# Patient Record
Sex: FEMALE | Race: BLACK OR AFRICAN AMERICAN | Employment: UNEMPLOYED | URBAN - METROPOLITAN AREA
[De-identification: names, ages, dates, MRNs, and addresses within clinical notes are randomized per-mention and may not be internally consistent; named-entity substitution may affect disease eponyms.]

---

## 2017-01-04 PROBLEM — I10 ESSENTIAL HYPERTENSION: Status: ACTIVE | Noted: 2017-01-04

## 2017-05-10 ENCOUNTER — HOSPITAL ENCOUNTER (OUTPATIENT)
Dept: MAMMOGRAPHY | Age: 62
Discharge: HOME | End: 2017-05-10
Attending: INTERNAL MEDICINE
Payer: MEDICARE

## 2017-05-10 DIAGNOSIS — Z12.31 VISIT FOR SCREENING MAMMOGRAM: ICD-10-CM

## 2017-05-10 PROCEDURE — 77067 SCR MAMMO BI INCL CAD: CPT

## 2017-07-20 ENCOUNTER — HOSPITAL ENCOUNTER (OUTPATIENT)
Dept: MAMMOGRAPHY | Age: 62
Discharge: HOME | End: 2017-07-20
Attending: FAMILY MEDICINE
Payer: MEDICARE

## 2017-07-20 DIAGNOSIS — Z78.0 POSTMENOPAUSAL: ICD-10-CM

## 2017-07-20 PROCEDURE — 77080 DXA BONE DENSITY AXIAL: CPT

## 2018-04-19 ENCOUNTER — HOSPITAL ENCOUNTER (OUTPATIENT)
Dept: SURGERY | Age: 63
Discharge: HOME | End: 2018-04-19

## 2018-04-19 VITALS
DIASTOLIC BLOOD PRESSURE: 76 MMHG | HEART RATE: 82 BPM | RESPIRATION RATE: 18 BRPM | BODY MASS INDEX: 23.77 KG/M2 | HEIGHT: 70 IN | WEIGHT: 166 LBS | TEMPERATURE: 98.7 F | OXYGEN SATURATION: 99 % | SYSTOLIC BLOOD PRESSURE: 152 MMHG

## 2018-04-19 NOTE — PERIOP NOTES
Patient verified name, , and surgery as listed in [facility]. Patient provided medical/health information and PTA medications to the best of their ability. TYPE  CASE:lA  Orders per surgeon: Received and dated . Labs per surgeon:None. Labs per anesthesia protocol: None. EKG  :  None, last EKG , Hx CABG 2006, no cardiac events or stents since. Patient denies GAY, or chest pain since CABG. Patient provided with and instructed on education handouts including Guide to Surgery, blood transfusions, pain management, and hand hygiene for the family and community, and [facility] brochure. Antibacterial saop and instructions given per hospital policy. Instructed patient to continue previous medications as prescribed prior to surgery unless otherwise directed and to take the following medications the day of surgery according to anesthesia guidelines : ASA, Carvedilol, Exemestane, Levothyroxine, Ranitidine, Sertraline . Instructed patient to hold  the following medications: Vitamins. Original medication prescription bottles were not visualized during patient appointment. Patient presented a list.    Patient teach back successful and patient demonstrates knowledge of instruction.

## 2018-04-22 ENCOUNTER — ANESTHESIA EVENT (OUTPATIENT)
Dept: SURGERY | Age: 63
End: 2018-04-22
Payer: MEDICARE

## 2018-04-22 RX ORDER — SODIUM CHLORIDE, SODIUM LACTATE, POTASSIUM CHLORIDE, CALCIUM CHLORIDE 600; 310; 30; 20 MG/100ML; MG/100ML; MG/100ML; MG/100ML
100 INJECTION, SOLUTION INTRAVENOUS CONTINUOUS
Status: CANCELLED | OUTPATIENT
Start: 2018-04-22

## 2018-04-22 RX ORDER — NALOXONE HYDROCHLORIDE 0.4 MG/ML
0.2 INJECTION, SOLUTION INTRAMUSCULAR; INTRAVENOUS; SUBCUTANEOUS AS NEEDED
Status: CANCELLED | OUTPATIENT
Start: 2018-04-22

## 2018-04-22 RX ORDER — SODIUM CHLORIDE 0.9 % (FLUSH) 0.9 %
5-10 SYRINGE (ML) INJECTION AS NEEDED
Status: CANCELLED | OUTPATIENT
Start: 2018-04-22

## 2018-04-22 RX ORDER — OXYCODONE HYDROCHLORIDE 5 MG/1
5 TABLET ORAL
Status: CANCELLED | OUTPATIENT
Start: 2018-04-22

## 2018-04-22 RX ORDER — HYDROMORPHONE HYDROCHLORIDE 2 MG/ML
0.5 INJECTION, SOLUTION INTRAMUSCULAR; INTRAVENOUS; SUBCUTANEOUS
Status: CANCELLED | OUTPATIENT
Start: 2018-04-22

## 2018-04-22 RX ORDER — DIPHENHYDRAMINE HYDROCHLORIDE 50 MG/ML
12.5 INJECTION, SOLUTION INTRAMUSCULAR; INTRAVENOUS
Status: CANCELLED | OUTPATIENT
Start: 2018-04-22

## 2018-04-22 RX ORDER — OXYCODONE HYDROCHLORIDE 5 MG/1
10 TABLET ORAL
Status: CANCELLED | OUTPATIENT
Start: 2018-04-22

## 2018-04-22 RX ORDER — FLUMAZENIL 0.1 MG/ML
0.2 INJECTION INTRAVENOUS
Status: CANCELLED | OUTPATIENT
Start: 2018-04-22

## 2018-04-22 RX ORDER — ACETAMINOPHEN 500 MG
1000 TABLET ORAL ONCE
Status: CANCELLED | OUTPATIENT
Start: 2018-04-22 | End: 2018-04-22

## 2018-04-23 ENCOUNTER — ANESTHESIA (OUTPATIENT)
Dept: SURGERY | Age: 63
End: 2018-04-23
Payer: MEDICARE

## 2018-04-23 ENCOUNTER — HOSPITAL ENCOUNTER (OUTPATIENT)
Age: 63
Setting detail: OUTPATIENT SURGERY
Discharge: HOME | End: 2018-04-23
Attending: ORTHOPAEDIC SURGERY | Admitting: ORTHOPAEDIC SURGERY
Payer: MEDICARE

## 2018-04-23 VITALS
OXYGEN SATURATION: 100 % | RESPIRATION RATE: 14 BRPM | TEMPERATURE: 98 F | WEIGHT: 166 LBS | BODY MASS INDEX: 23.77 KG/M2 | HEART RATE: 63 BPM | DIASTOLIC BLOOD PRESSURE: 72 MMHG | HEIGHT: 70 IN | SYSTOLIC BLOOD PRESSURE: 156 MMHG

## 2018-04-23 LAB — POTASSIUM BLD-SCNC: 3.4 MMOL/L (ref 3.5–5.1)

## 2018-04-23 PROCEDURE — 84132 ASSAY OF SERUM POTASSIUM: CPT

## 2018-04-23 PROCEDURE — 77030002986 HC SUT PROL J&J -A: Performed by: ORTHOPAEDIC SURGERY

## 2018-04-23 PROCEDURE — 74011000250 HC RX REV CODE- 250: Performed by: ORTHOPAEDIC SURGERY

## 2018-04-23 PROCEDURE — 76010000138 HC OR TIME 0.5 TO 1 HR: Performed by: ORTHOPAEDIC SURGERY

## 2018-04-23 PROCEDURE — 76210000063 HC OR PH I REC FIRST 0.5 HR: Performed by: ORTHOPAEDIC SURGERY

## 2018-04-23 PROCEDURE — 74011250636 HC RX REV CODE- 250/636: Performed by: ANESTHESIOLOGY

## 2018-04-23 PROCEDURE — 77030011884 HC TAPE CST PLSTR BSNM -A: Performed by: ORTHOPAEDIC SURGERY

## 2018-04-23 PROCEDURE — 74011250636 HC RX REV CODE- 250/636: Performed by: ORTHOPAEDIC SURGERY

## 2018-04-23 PROCEDURE — 77030010507 HC ADH SKN DERMBND J&J -B: Performed by: ORTHOPAEDIC SURGERY

## 2018-04-23 PROCEDURE — 77030000032 HC CUF TRNQT ZIMM -B: Performed by: ORTHOPAEDIC SURGERY

## 2018-04-23 PROCEDURE — 77030018836 HC SOL IRR NACL ICUM -A: Performed by: ORTHOPAEDIC SURGERY

## 2018-04-23 PROCEDURE — 76060000032 HC ANESTHESIA 0.5 TO 1 HR: Performed by: ORTHOPAEDIC SURGERY

## 2018-04-23 PROCEDURE — 76210000020 HC REC RM PH II FIRST 0.5 HR: Performed by: ORTHOPAEDIC SURGERY

## 2018-04-23 PROCEDURE — 74011250636 HC RX REV CODE- 250/636

## 2018-04-23 RX ORDER — SODIUM CHLORIDE 0.9 % (FLUSH) 0.9 %
5-10 SYRINGE (ML) INJECTION AS NEEDED
Status: DISCONTINUED | OUTPATIENT
Start: 2018-04-23 | End: 2018-04-23 | Stop reason: HOSPADM

## 2018-04-23 RX ORDER — MIDAZOLAM HYDROCHLORIDE 1 MG/ML
2 INJECTION, SOLUTION INTRAMUSCULAR; INTRAVENOUS
Status: DISCONTINUED | OUTPATIENT
Start: 2018-04-23 | End: 2018-04-23 | Stop reason: HOSPADM

## 2018-04-23 RX ORDER — LIDOCAINE HYDROCHLORIDE 10 MG/ML
0.1 INJECTION INFILTRATION; PERINEURAL AS NEEDED
Status: DISCONTINUED | OUTPATIENT
Start: 2018-04-23 | End: 2018-04-23 | Stop reason: HOSPADM

## 2018-04-23 RX ORDER — CEFAZOLIN SODIUM/WATER 2 G/20 ML
2 SYRINGE (ML) INTRAVENOUS
Status: COMPLETED | OUTPATIENT
Start: 2018-04-23 | End: 2018-04-23

## 2018-04-23 RX ORDER — FENTANYL CITRATE 50 UG/ML
100 INJECTION, SOLUTION INTRAMUSCULAR; INTRAVENOUS ONCE
Status: DISCONTINUED | OUTPATIENT
Start: 2018-04-23 | End: 2018-04-23 | Stop reason: HOSPADM

## 2018-04-23 RX ORDER — MIDAZOLAM HYDROCHLORIDE 1 MG/ML
2 INJECTION, SOLUTION INTRAMUSCULAR; INTRAVENOUS ONCE
Status: DISCONTINUED | OUTPATIENT
Start: 2018-04-23 | End: 2018-04-23 | Stop reason: HOSPADM

## 2018-04-23 RX ORDER — SODIUM CHLORIDE, SODIUM LACTATE, POTASSIUM CHLORIDE, CALCIUM CHLORIDE 600; 310; 30; 20 MG/100ML; MG/100ML; MG/100ML; MG/100ML
100 INJECTION, SOLUTION INTRAVENOUS CONTINUOUS
Status: DISCONTINUED | OUTPATIENT
Start: 2018-04-23 | End: 2018-04-23 | Stop reason: HOSPADM

## 2018-04-23 RX ORDER — PROPOFOL 10 MG/ML
INJECTION, EMULSION INTRAVENOUS
Status: DISCONTINUED | OUTPATIENT
Start: 2018-04-23 | End: 2018-04-23 | Stop reason: HOSPADM

## 2018-04-23 RX ORDER — BUPIVACAINE HYDROCHLORIDE 5 MG/ML
INJECTION, SOLUTION EPIDURAL; INTRACAUDAL AS NEEDED
Status: DISCONTINUED | OUTPATIENT
Start: 2018-04-23 | End: 2018-04-23 | Stop reason: HOSPADM

## 2018-04-23 RX ORDER — LIDOCAINE HYDROCHLORIDE 10 MG/ML
INJECTION INFILTRATION; PERINEURAL AS NEEDED
Status: DISCONTINUED | OUTPATIENT
Start: 2018-04-23 | End: 2018-04-23 | Stop reason: HOSPADM

## 2018-04-23 RX ORDER — SODIUM CHLORIDE 0.9 % (FLUSH) 0.9 %
5-10 SYRINGE (ML) INJECTION EVERY 8 HOURS
Status: DISCONTINUED | OUTPATIENT
Start: 2018-04-23 | End: 2018-04-23 | Stop reason: HOSPADM

## 2018-04-23 RX ADMIN — Medication 2 G: at 08:47

## 2018-04-23 RX ADMIN — SODIUM CHLORIDE, SODIUM LACTATE, POTASSIUM CHLORIDE, AND CALCIUM CHLORIDE 100 ML/HR: 600; 310; 30; 20 INJECTION, SOLUTION INTRAVENOUS at 07:15

## 2018-04-23 RX ADMIN — PROPOFOL 200 MCG/KG/MIN: 10 INJECTION, EMULSION INTRAVENOUS at 08:45

## 2018-04-23 NOTE — ANESTHESIA PREPROCEDURE EVALUATION
Anesthetic History   No history of anesthetic complications            Review of Systems / Medical History  Patient summary reviewed and pertinent labs reviewed    Pulmonary  Within defined limits                 Neuro/Psych         Headaches and psychiatric history     Cardiovascular    Hypertension          CAD, cardiac stents (2006) and CABG (2006)    Exercise tolerance: >4 METS     GI/Hepatic/Renal     GERD: well controlled           Endo/Other      Hypothyroidism  Cancer (breast) and anemia     Other Findings              Physical Exam    Airway  Mallampati: III  TM Distance: 4 - 6 cm  Neck ROM: normal range of motion   Mouth opening: Normal     Cardiovascular  Regular rate and rhythm,  S1 and S2 normal,  no murmur, click, rub, or gallop  Rhythm: regular  Rate: normal         Dental    Dentition: Full upper dentures     Pulmonary  Breath sounds clear to auscultation               Abdominal  GI exam deferred       Other Findings            Anesthetic Plan    ASA: 3  Anesthesia type: total IV anesthesia          Induction: Intravenous  Anesthetic plan and risks discussed with: Patient and Spouse

## 2018-04-23 NOTE — IP AVS SNAPSHOT
[address] [facility] [address] 
 
 
 [address] [facility] [address] [facility] 
[phone] Patient: Leandro Caro MRN: [id number] :1955 About your hospitalization You were admitted on:  2018 You last received care in the:  [place] [address] You were discharged on:  2018 Why you were hospitalized Your primary diagnosis was:  Not on File Follow-up Information Follow up With Details Comments Contact Info Jaren Vasquez MD   [address]  
[address] [facility] [address] 
[phone] Dulce Mancia MD   [address] [address] 
[phone] Your Scheduled Appointments Friday May 11, 2018 12:15 PM EDT  
JOSE MAMMO SCREENING with SFE JOSE BI ROOM 2 [address] [facility] ([facility] [address] [facility] [address]) [address] [facility] [address] [phone] ***** NOTE: Appointments for the Mobile Mammography UNIT CANNOT be made on My Chart *****  PATIENT ARRIVAL - Please report 30 minutes early to check in. GENERAL INSTRUCTIONS -  On the day of your exam do not use any bath powder, deodorant or lotions on the chest or armpit area. Wear two-piece outfit for ease of changing. Allow at least 1 hour for test. -  If scheduled at [facility], please register on the 1st floor before going upstairs. [address] [facility]. 2nd floor [address] [facility] Discharge Orders None A check andres indicates which time of day the medication should be taken. My Medications CHANGE how you take these medications Instructions Each Dose to Equal  
 Morning Noon Evening Bedtime  
 carvedilol 12.5 mg tablet Commonly known as:  Kwadwo Ferris What changed:  when to take this Your last dose was: Your next dose is: Take 1 Tab by mouth daily for 90 days. 12.5 mg  
    
   
   
   
  
 ezetimibe 10 mg tablet Commonly known as:  Beto Coronado What changed:  when to take this Your last dose was: Your next dose is: Take 1 Tab by mouth daily. 10 mg  
    
   
   
   
  
 sertraline 100 mg tablet Commonly known as:  ZOLOFT What changed:  See the new instructions. Your last dose was: Your next dose is: TAKE 1 AND 1/2 TABLETS BY MOUTH EVERY DAY  
     
   
   
   
  
  
CONTINUE taking these medications Instructions Each Dose to Equal  
 Morning Noon Evening Bedtime  
 aspirin delayed-release 81 mg tablet Your last dose was: Your next dose is: Take 81 mg by mouth daily. Instructed to take DOS per Anesthesia guidelines. Indications: s/p triple bypass 81 mg  
    
   
   
   
  
 atorvastatin 80 mg tablet Commonly known as:  LIPITOR Your last dose was: Your next dose is: Take 1 Tab by mouth nightly. 80 mg  
    
   
   
   
  
 exemestane 25 mg tablet Commonly known as:  Greer Dumont Your last dose was: Your next dose is:    
   
   
 25 mg every morning. Indications: prevention of breast cancer in high risk women 25 mg  
    
   
   
   
  
 felodipine 10 mg 24 hr tablet Commonly known as:  PLENDIL SR Your last dose was: Your next dose is: Take 1 Tab by mouth nightly. 10 mg GERITOL COMPLETE PO Your last dose was: Your next dose is: Take 1 Tab by mouth every other day. Pt states also has liquid form. 1 Tab  
    
   
   
   
  
 levothyroxine 137 mcg tablet Commonly known as:  SYNTHROID Your last dose was: Your next dose is: TAKE 1 TABLET BY MOUTH EVERY DAY BEFORE BREAKFAST potassium chloride SA 10 mEq capsule Commonly known as:  Lior Marcial  
   
 Your last dose was: Your next dose is: TAKE ONE CAPSULE BY MOUTH EVERY DAY  
     
   
   
   
  
 raNITIdine 150 mg tablet Commonly known as:  ZANTAC Your last dose was: Your next dose is: TAKE 1 TABLET BY MOUTH TWICE DAILY  
     
   
   
   
  
 VITAMIN D3 4,000 unit Cap Generic drug:  cholecalciferol (vitamin D3) Your last dose was: Your next dose is: Take 1 Cap by mouth every other day. 1 Cap Discharge Instructions Keep dressing clean, dry and intact until post op day number 2-3. Then may shower, pat dry and keep covered until follow up. Do not scrub incision or submerge under water. Move fingers, elevate, and ice to prevent swelling. No heavy lifting. ACTIVITY · As tolerated and as directed by your doctor. · Bathe or shower as directed by your doctor. DIET · Clear liquids until no nausea or vomiting; then light diet for the first day. · Advance to regular diet on second day, unless your doctor orders otherwise. · If nausea and vomiting continues, call your doctor. PAIN 
· Take pain medication as directed by your doctor. · Call your doctor if pain is NOT relieved by medication. · DO NOT take aspirin of blood thinners unless directed by your doctor. DRESSING CARE  
 
 
CALL YOUR DOCTOR IF  
· Excessive bleeding that does not stop after holding pressure over the area · Temperature of 101 degrees F or above · Excessive redness, swelling or bruising, and/ or green or yellow, smelly discharge from incision AFTER ANESTHESIA · For the first 24 hours: DO NOT Drive, Drink alcoholic beverages, or Make important decisions. · Be aware of dizziness following anesthesia and while taking pain medication. APPOINTMENT DATE/ TIME 
 
YOUR DOCTOR'S PHONE NUMBER  
 
 
DISCHARGE SUMMARY from Nurse PATIENT INSTRUCTIONS: 
 
 After general anesthesia or intravenous sedation, for 24 hours or while taking prescription Narcotics: · Limit your activities · Do not drive and operate hazardous machinery · Do not make important personal or business decisions · Do  not drink alcoholic beverages · If you have not urinated within 8 hours after discharge, please contact your surgeon on call. *  Please give a list of your current medications to your Primary Care Provider. *  Please update this list whenever your medications are discontinued, doses are 
    changed, or new medications (including over-the-counter products) are added. *  Please carry medication information at all times in case of emergency situations. These are general instructions for a healthy lifestyle: No smoking/ No tobacco products/ Avoid exposure to second hand smoke Surgeon General's Warning:  Quitting smoking now greatly reduces serious risk to your health. Obesity, smoking, and sedentary lifestyle greatly increases your risk for illness A healthy diet, regular physical exercise & weight monitoring are important for maintaining a healthy lifestyle You may be retaining fluid if you have a history of heart failure or if you experience any of the following symptoms:  Weight gain of 3 pounds or more overnight or 5 pounds in a week, increased swelling in our hands or feet or shortness of breath while lying flat in bed. Please call your doctor as soon as you notice any of these symptoms; do not wait until your next office visit. Recognize signs and symptoms of STROKE: 
 
F-face looks uneven A-arms unable to move or move unevenly S-speech slurred or non-existent T-time-call 911 as soon as signs and symptoms begin-DO NOT go Back to bed or wait to see if you get better-TIME IS BRAIN. Introducing [facility]!    
 Aria Baird introduces [employer] patient portal. Now you can access parts of your medical record, email your doctor's office, and request medication refills online. 1. In your internet browser, go to [email or web address] 2. Click on the First Time User? Click Here link in the Sign In box. You will see the New Member Sign Up page. 3. Enter your [employer] Access Code exactly as it appears below. You will not need to use this code after youve completed the sign-up process. If you do not sign up before the expiration date, you must request a new code. · [employer] Access Code: [email or web address] Expires: 5/29/2018  2:59 PM 
 
4. Enter the last four digits of your Social Security Number (xxxx) and Date of Birth (mm/dd/yyyy) as indicated and click Submit. You will be taken to the next sign-up page. 5. Create a [employer] ID. This will be your [employer] login ID and cannot be changed, so think of one that is secure and easy to remember. 6. Create a [employer] password. You can change your password at any time. 7. Enter your Password Reset Question and Answer. This can be used at a later time if you forget your password. 8. Enter your e-mail address. You will receive e-mail notification when new information is available in [address] 9. Click Sign Up. You can now view and download portions of your medical record. 10. Click the Download Summary menu link to download a portable copy of your medical information. If you have questions, please visit the Frequently Asked Questions section of the [employer] website. Remember, [employer] is NOT to be used for urgent needs. For medical emergencies, dial 911. Now available from your iPhone and Android! Introducing Damion Alonzo As a [employer] patient, I wanted to make you aware of our electronic visit tool called Damion Alonzo. [employer] 24/7 allows you to connect within minutes with a medical provider 24 hours a day, seven days a week via a mobile device or tablet or logging into a secure website from your computer. You can access [facility] from anywhere in the [place]. A virtual visit might be right for you when you have a simple condition and feel like you just dont want to get out of bed, or cant get away from work for an appointment, when your regular [employer] provider is not available (evenings, weekends or holidays), or when youre out of town and need minor care. Electronic visits cost only $49 and if the [employer] 24/7 provider determines a prescription is needed to treat your condition, one can be electronically transmitted to a nearby pharmacy*. Please take a moment to enroll today if you have not already done so. The enrollment process is free and takes just a few minutes. To enroll, please download the [employer] 24/7 sang to your tablet or phone, or visit [email or web address] to enroll on your computer. And, as an [address] [place] [address] patient with a [employer] account, the results of your visits will be scanned into your electronic medical record and your primary care provider will be able to view the scanned results. We urge you to continue to see your regular [employer] provider for your ongoing medical care. And while your primary care provider may not be the one available when you seek a [facility] virtual visit, the peace of mind you get from getting a real diagnosis real time can be priceless. For more information on [facility], view our Frequently Asked Questions (FAQs) at [email or web address]. Sincerely, 
 
Angi Solo MD 
Chief Medical Officer Riya8 Wendy Jackson *:  certain medications cannot be prescribed via [facility] Providers Seen During Your Hospitalization Provider Specialty Primary office phone Ranjit Chambers MD Orthopedic Surgery [phone] Your Primary Care Physician (PCP) Primary Care Physician Office Phone Office Fax Nickie England [phone] You are allergic to the following Allergen Reactions Codeine Rash Recent Documentation Height Weight BMI OB Status Smoking Status 1.778 m 75.3 kg 23.82 kg/m2 Hysterectomy Never Smoker Emergency Contacts Name Discharge Info Relation Home Work Mobile Stan Medina  Spouse [3] [phone] Patient Belongings The following personal items are in your possession at time of discharge: 
  Dental Appliances: Uppers  Visual Aid: Glasses      Home Medications: None   Jewelry: Earrings  Clothing: Shirt, Pants, Footwear, Undergarments, Socks    Other Valuables: None Please provide this summary of care documentation to your next provider. Signatures-by signing, you are acknowledging that this After Visit Summary has been reviewed with you and you have received a copy. Patient Signature:  ____________________________________________________________ Date:  ____________________________________________________________  
  
Arna [place] Provider Signature:  ____________________________________________________________ Date:  ____________________________________________________________

## 2018-04-23 NOTE — BRIEF OP NOTE
BRIEF OPERATIVE NOTE    Date of Procedure: 4/23/2018   Preoperative Diagnosis: Trigger index finger of left hand [M65.322]  Trigger finger, left middle finger [M65.332]  Postoperative Diagnosis:  index finger of left hand [M65.322]  Trigger finger, left middle finger [M65.332]    Procedure(s):  LEFT INDEX AND MIDDLE FINGER TRIGGER RELEASE  Surgeon(s) and Role:     * Dulce Mancia MD - Primary         Surgical Assistant: MADI    Surgical Staff:  Circ-1: Glynn Cuadra RN  Scrub Tech-1: Ayan Van  Event Time In   Incision Start 7119   Incision Close 0907     Anesthesia: MAC   Estimated Blood Loss: MINIMAL  Specimens: * No specimens in log *   Findings: SEE DICTATION   Complications: NONE  Implants: * No implants in log *

## 2018-04-23 NOTE — PROGRESS NOTES
Spiritual Care visit. Initial Visit, Pre Surgery Consult. Visit and prayer before patient goes to surgery.     Visit by Parker Koo M.Ed., Th.B. ,Staff

## 2018-04-23 NOTE — ANESTHESIA POSTPROCEDURE EVALUATION
Post-Anesthesia Evaluation and Assessment    Patient: Yadira Sánchez MRN: [id number]  SSN: [id number]    YOB: 1955  Age: 57 y.o. Sex: female       Cardiovascular Function/Vital Signs  Visit Vitals    /72    Pulse 63    Temp 36.7 °C (98 °F)    Resp 14    Ht 5' 10\" (1.778 m)    Wt 75.3 kg (166 lb)    SpO2 100%    BMI 23.82 kg/m2       Patient is status post total IV anesthesia anesthesia for Procedure(s):  LEFT INDEX AND MIDDLE FINGER TRIGGER RELEASE. Nausea/Vomiting: None    Postoperative hydration reviewed and adequate. Pain:  Pain Scale 1: Numeric (0 - 10) (04/23/18 0936)  Pain Intensity 1: 0 (04/23/18 0936)   Managed    Neurological Status:   Neuro (WDL): Exceptions to WDL (04/23/18 0936)  Neuro  Neurologic State: Alert (04/23/18 0936)  LUE Motor Response: Numbness (04/23/18 0936)  LLE Motor Response: Purposeful (04/23/18 0936)  RUE Motor Response: Purposeful (04/23/18 0936)  RLE Motor Response: Purposeful (04/23/18 0936)   At baseline    Mental Status and Level of Consciousness: Arousable    Pulmonary Status:   O2 Device: Room air (04/23/18 0942)   Adequate oxygenation and airway patent    Complications related to anesthesia: None    Post-anesthesia assessment completed.  No concerns    Signed By: Cami Hicks MD     April 23, 2018

## 2018-05-11 ENCOUNTER — HOSPITAL ENCOUNTER (OUTPATIENT)
Dept: MAMMOGRAPHY | Age: 63
Discharge: HOME | End: 2018-05-11
Attending: INTERNAL MEDICINE
Payer: MEDICARE

## 2018-05-11 DIAGNOSIS — Z12.39 SCREENING BREAST EXAMINATION: ICD-10-CM

## 2018-05-11 PROCEDURE — 77067 SCR MAMMO BI INCL CAD: CPT

## 2019-02-09 ENCOUNTER — APPOINTMENT (OUTPATIENT)
Dept: CT IMAGING | Age: 64
End: 2019-02-09
Attending: EMERGENCY MEDICINE
Payer: MEDICARE

## 2019-02-09 ENCOUNTER — APPOINTMENT (OUTPATIENT)
Dept: GENERAL RADIOLOGY | Age: 64
End: 2019-02-09
Attending: EMERGENCY MEDICINE
Payer: MEDICARE

## 2019-02-09 ENCOUNTER — HOSPITAL ENCOUNTER (EMERGENCY)
Age: 64
Discharge: HOME | End: 2019-02-09
Attending: EMERGENCY MEDICINE
Payer: MEDICARE

## 2019-02-09 VITALS
TEMPERATURE: 98.1 F | HEIGHT: 70 IN | BODY MASS INDEX: 22.9 KG/M2 | DIASTOLIC BLOOD PRESSURE: 89 MMHG | WEIGHT: 160 LBS | SYSTOLIC BLOOD PRESSURE: 127 MMHG | HEART RATE: 89 BPM | RESPIRATION RATE: 18 BRPM | OXYGEN SATURATION: 100 %

## 2019-02-09 DIAGNOSIS — J18.9 COMMUNITY ACQUIRED PNEUMONIA OF LEFT LUNG, UNSPECIFIED PART OF LUNG: Primary | ICD-10-CM

## 2019-02-09 LAB
ALBUMIN SERPL-MCNC: 3 G/DL (ref 3.2–4.6)
ALBUMIN/GLOB SERPL: 0.6 {RATIO} (ref 1.2–3.5)
ALP SERPL-CCNC: 79 U/L (ref 50–136)
ALT SERPL-CCNC: 21 U/L (ref 12–65)
ANION GAP SERPL CALC-SCNC: 6 MMOL/L (ref 7–16)
AST SERPL-CCNC: 17 U/L (ref 15–37)
BASOPHILS # BLD: 0 K/UL (ref 0–0.2)
BASOPHILS NFR BLD: 1 % (ref 0–2)
BILIRUB SERPL-MCNC: 0.4 MG/DL (ref 0.2–1.1)
BNP SERPL-MCNC: 61 PG/ML
BUN SERPL-MCNC: 18 MG/DL (ref 8–23)
CALCIUM SERPL-MCNC: 9.3 MG/DL (ref 8.3–10.4)
CHLORIDE SERPL-SCNC: 102 MMOL/L (ref 98–107)
CO2 SERPL-SCNC: 28 MMOL/L (ref 21–32)
CREAT SERPL-MCNC: 0.89 MG/DL (ref 0.6–1)
CRP SERPL-MCNC: 1.6 MG/DL (ref 0–0.9)
D DIMER PPP FEU-MCNC: 1.69 UG/ML(FEU)
DIFFERENTIAL METHOD BLD: ABNORMAL
EOSINOPHIL # BLD: 0 K/UL (ref 0–0.8)
EOSINOPHIL NFR BLD: 0 % (ref 0.5–7.8)
ERYTHROCYTE [DISTWIDTH] IN BLOOD BY AUTOMATED COUNT: 13.8 % (ref 11.9–14.6)
GLOBULIN SER CALC-MCNC: 5.1 G/DL (ref 2.3–3.5)
GLUCOSE SERPL-MCNC: 135 MG/DL (ref 65–100)
HCT VFR BLD AUTO: 30.9 % (ref 35.8–46.3)
HGB BLD-MCNC: 10.2 G/DL (ref 11.7–15.4)
IMM GRANULOCYTES # BLD AUTO: 0 K/UL (ref 0–0.5)
IMM GRANULOCYTES NFR BLD AUTO: 0 % (ref 0–5)
LYMPHOCYTES # BLD: 1.8 K/UL (ref 0.5–4.6)
LYMPHOCYTES NFR BLD: 36 % (ref 13–44)
MAGNESIUM SERPL-MCNC: 1.8 MG/DL (ref 1.8–2.4)
MCH RBC QN AUTO: 30.3 PG (ref 26.1–32.9)
MCHC RBC AUTO-ENTMCNC: 33 G/DL (ref 31.4–35)
MCV RBC AUTO: 91.7 FL (ref 79.6–97.8)
MONOCYTES # BLD: 0.3 K/UL (ref 0.1–1.3)
MONOCYTES NFR BLD: 7 % (ref 4–12)
NEUTS SEG # BLD: 2.8 K/UL (ref 1.7–8.2)
NEUTS SEG NFR BLD: 56 % (ref 43–78)
NRBC # BLD: 0 K/UL (ref 0–0.2)
PLATELET # BLD AUTO: 254 K/UL (ref 150–450)
PMV BLD AUTO: 11.3 FL (ref 9.4–12.3)
POTASSIUM SERPL-SCNC: 3.4 MMOL/L (ref 3.5–5.1)
PROT SERPL-MCNC: 8.1 G/DL (ref 6.3–8.2)
RBC # BLD AUTO: 3.37 M/UL (ref 4.05–5.2)
SODIUM SERPL-SCNC: 136 MMOL/L (ref 136–145)
TROPONIN I BLD-MCNC: 0 NG/ML (ref 0.02–0.05)
WBC # BLD AUTO: 5 K/UL (ref 4.3–11.1)

## 2019-02-09 PROCEDURE — 83880 ASSAY OF NATRIURETIC PEPTIDE: CPT

## 2019-02-09 PROCEDURE — 71260 CT THORAX DX C+: CPT

## 2019-02-09 PROCEDURE — 71046 X-RAY EXAM CHEST 2 VIEWS: CPT

## 2019-02-09 PROCEDURE — 83735 ASSAY OF MAGNESIUM: CPT

## 2019-02-09 PROCEDURE — 85025 COMPLETE CBC W/AUTO DIFF WBC: CPT

## 2019-02-09 PROCEDURE — 85379 FIBRIN DEGRADATION QUANT: CPT

## 2019-02-09 PROCEDURE — 74011000258 HC RX REV CODE- 258: Performed by: EMERGENCY MEDICINE

## 2019-02-09 PROCEDURE — 86140 C-REACTIVE PROTEIN: CPT

## 2019-02-09 PROCEDURE — 74011250637 HC RX REV CODE- 250/637: Performed by: EMERGENCY MEDICINE

## 2019-02-09 PROCEDURE — 99284 EMERGENCY DEPT VISIT MOD MDM: CPT | Performed by: EMERGENCY MEDICINE

## 2019-02-09 PROCEDURE — 84484 ASSAY OF TROPONIN QUANT: CPT

## 2019-02-09 PROCEDURE — 80053 COMPREHEN METABOLIC PANEL: CPT

## 2019-02-09 PROCEDURE — 93005 ELECTROCARDIOGRAM TRACING: CPT | Performed by: EMERGENCY MEDICINE

## 2019-02-09 PROCEDURE — 74011636320 HC RX REV CODE- 636/320: Performed by: EMERGENCY MEDICINE

## 2019-02-09 RX ORDER — LEVOFLOXACIN 500 MG/1
500 TABLET, FILM COATED ORAL DAILY
Qty: 7 TAB | Refills: 0 | Status: SHIPPED | OUTPATIENT
Start: 2019-02-09 | End: 2019-02-16

## 2019-02-09 RX ORDER — SODIUM CHLORIDE 0.9 % (FLUSH) 0.9 %
10 SYRINGE (ML) INJECTION
Status: COMPLETED | OUTPATIENT
Start: 2019-02-09 | End: 2019-02-09

## 2019-02-09 RX ORDER — LEVOFLOXACIN 500 MG/1
500 TABLET, FILM COATED ORAL
Status: COMPLETED | OUTPATIENT
Start: 2019-02-09 | End: 2019-02-09

## 2019-02-09 RX ADMIN — SODIUM CHLORIDE 100 ML: 900 INJECTION, SOLUTION INTRAVENOUS at 20:01

## 2019-02-09 RX ADMIN — Medication 10 ML: at 20:01

## 2019-02-09 RX ADMIN — IOPAMIDOL 100 ML: 755 INJECTION, SOLUTION INTRAVENOUS at 19:58

## 2019-02-09 RX ADMIN — LEVOFLOXACIN 500 MG: 500 TABLET, FILM COATED ORAL at 21:13

## 2019-02-09 NOTE — ED PROVIDER NOTES
Patient presents complaining of some right-sided chest pain and right arm pain that she is of the believe is related to the Port-A-Cath she has in the left chest.  This was placed for treatment for diagnosis of breast cancer in the remote past.  She denies any fever or chills or cough she denies any swelling of the upper extremity and the pain is in discomfort been constant for the past several weeks. The history is provided by the patient. Chest Pain    This is a new problem. The current episode started more than 1 week ago. The problem has not changed since onset. The problem occurs constantly. The pain is associated with normal activity. The pain is present in the right side. The pain is at a severity of 6/10. The pain is moderate. The quality of the pain is described as pressure-like. The pain radiates to the right arm. The symptoms are aggravated by movement. Pertinent negatives include no abdominal pain, no back pain, no cough, no diaphoresis, no dizziness, no fever, no hemoptysis, no irregular heartbeat, no malaise/fatigue, no nausea, no near-syncope, no numbness, no orthopnea, no palpitations, no PND, no shortness of breath, no vomiting and no weakness. She has tried nothing for the symptoms. The treatment provided no relief. Risk factors include no risk factors.         Past Medical History:   Diagnosis Date    Anxiety disorder     takes zoloft prn    Atrial fibrillation (HCC)     pt states not aware of this dx; will obtain cardiac records    [facility] 02/09/2016    Rt Mastectomy    CAD (coronary artery disease) 2006    triple bypass; pt is followed by Dr. Abhilash Fay [facility]) dx 1997    right breast cancer; chemotherapy     Depression     managed w/med    Dyspepsia and other specified disorders of function of stomach     pt states symptoms are better 2/3/16    Environmental allergies     takes allegra prn    GERD (gastroesophageal reflux disease)     takes zantac prn    Headache     pt states not consistent; happens prn    Hx of iron deficiency anemia     had blood transfusion 11/15    Hypercholesterolemia     pt not taking medication    Hypertension     managed w/med    Prediabetes     Primary hyperparathyroidism ([address]     s/p parathyroidectomy; takes medication daily    PUD (peptic ulcer disease)     Patient denies    Stool color black     pt states due to previous chemo; pt states no current issues    Thyroid disease     s/p thyroidectomy; takes medication daily       Past Surgical History:   Procedure Laterality Date    CABG, ARTERY-VEIN, THREE  2006    cardiac stent placed prior to sx    HX BREAST LUMPECTOMY Right 1997    breast cancer     HX CARPAL TUNNEL RELEASE Right     HX COLONOSCOPY  2005    HX HYSTERECTOMY      HX MASTECTOMY Right 2/9/2016    BREAST MASTECTOMY SIMPLE RIGHT  performed by Cindy Cantu MD at [email or web address] HX PARATHYROIDECTOMY  2007    HX THYROIDECTOMY  2007    HX TUBAL LIGATION      HX VASCULAR ACCESS  6/15    left chest wall life port for chemo         Family History:   Problem Relation Age of Onset    Heart Disease Mother     Hypertension Mother     Cancer Father         colon    Breast Cancer Neg Hx        Social History     Socioeconomic History    Marital status:      Spouse name: Not on file    Number of children: Not on file    Years of education: Not on file    Highest education level: Not on file   Social Needs    Financial resource strain: Not on file    Food insecurity - worry: Not on file    Food insecurity - inability: Not on file   [email or web address] needs - medical: Not on file   [email or web address] needs - non-medical: Not on file   Occupational History    Not on file   Tobacco Use    Smoking status: Never Smoker    Smokeless tobacco: Never Used   Substance and Sexual Activity    Alcohol use: No     Alcohol/week: 0.0 oz    Drug use: No    Sexual activity: Not on file   Other Topics Concern    Not on file   Social History Narrative    Not on file         ALLERGIES: Codeine    Review of Systems   Constitutional: Negative for diaphoresis, fever and malaise/fatigue. Respiratory: Negative for cough, hemoptysis and shortness of breath. Cardiovascular: Positive for chest pain. Negative for palpitations, orthopnea, PND and near-syncope. Gastrointestinal: Negative for abdominal pain, nausea and vomiting. Musculoskeletal: Negative for back pain. Neurological: Negative for dizziness, weakness and numbness. All other systems reviewed and are negative. Vitals:    02/09/19 1350   BP: 131/76   Pulse: 85   Resp: 18   Temp: 98.2 °F (36.8 °C)   SpO2: 99%   Weight: 72.6 kg (160 lb)   Height: 5' 10\" (1.778 m)            Physical Exam   Constitutional: She is oriented to person, place, and time. She appears well-developed and well-nourished. No distress. HENT:   Head: Normocephalic and atraumatic. Eyes: Conjunctivae and EOM are normal. Pupils are equal, round, and reactive to light. Neck: Normal range of motion. Neck supple. Cardiovascular: Normal rate and regular rhythm. Pulmonary/Chest: Effort normal and breath sounds normal. She exhibits no tenderness. Abdominal: Soft. Bowel sounds are normal.   Musculoskeletal: Normal range of motion. Neurological: She is alert and oriented to person, place, and time. Skin: Skin is warm and dry. Capillary refill takes less than 2 seconds. She is not diaphoretic. Psychiatric: She has a normal mood and affect. Her behavior is normal.   Nursing note and vitals reviewed.        MDM  Number of Diagnoses or Management Options     Amount and/or Complexity of Data Reviewed  Clinical lab tests: ordered and reviewed  Tests in the radiology section of CPT®: ordered  Independent visualization of images, tracings, or specimens: yes    Risk of Complications, Morbidity, and/or Mortality  Presenting problems: moderate  Diagnostic procedures: moderate  Management options: moderate    Patient Progress  Patient progress: stable         Procedures

## 2019-02-09 NOTE — ED TRIAGE NOTES
Patient reports port access has become painful for some time. States had an office appointment to 'get it cleaned' but missed the appointment due to 's death.  Site does not appear to be swollen or red in triage

## 2019-02-10 NOTE — DISCHARGE INSTRUCTIONS
Patient Education        Pneumonia: Care Instructions  Your Care Instructions    Pneumonia is an infection of the lungs. Most cases are caused by infections from bacteria or viruses. Pneumonia may be mild or very severe. If it is caused by bacteria, you will be treated with antibiotics. It may take a few weeks to a few months to recover fully from pneumonia, depending on how sick you were and whether your overall health is good. Follow-up care is a key part of your treatment and safety. Be sure to make and go to all appointments, and call your doctor if you are having problems. It's also a good idea to know your test results and keep a list of the medicines you take. How can you care for yourself at home? · Take your antibiotics exactly as directed. Do not stop taking the medicine just because you are feeling better. You need to take the full course of antibiotics. · Take your medicines exactly as prescribed. Call your doctor if you think you are having a problem with your medicine. · Get plenty of rest and sleep. You may feel weak and tired for a while, but your energy level will improve with time. · To prevent dehydration, drink plenty of fluids, enough so that your urine is light yellow or clear like water. Choose water and other caffeine-free clear liquids until you feel better. If you have kidney, heart, or liver disease and have to limit fluids, talk with your doctor before you increase the amount of fluids you drink. · Take care of your cough so you can rest. A cough that brings up mucus from your lungs is common with pneumonia. It is one way your body gets rid of the infection. But if coughing keeps you from resting or causes severe fatigue and chest-wall pain, talk to your doctor. He or she may suggest that you take a medicine to reduce the cough. · Use a vaporizer or humidifier to add moisture to your bedroom. Follow the directions for cleaning the machine.   · Do not smoke or allow others to smoke around you. Smoke will make your cough last longer. If you need help quitting, talk to your doctor about stop-smoking programs and medicines. These can increase your chances of quitting for good. · Take an over-the-counter pain medicine, such as acetaminophen (Tylenol), ibuprofen (Advil, Motrin), or naproxen (Aleve). Read and follow all instructions on the label. · Do not take two or more pain medicines at the same time unless the doctor told you to. Many pain medicines have acetaminophen, which is Tylenol. Too much acetaminophen (Tylenol) can be harmful. · If you were given a spirometer to measure how well your lungs are working, use it as instructed. This can help your doctor tell how your recovery is going. · To prevent pneumonia in the future, talk to your doctor about getting a flu vaccine (once a year) and a pneumococcal vaccine (one time only for most people). When should you call for help? Call 911 anytime you think you may need emergency care. For example, call if:    · You have severe trouble breathing.    Call your doctor now or seek immediate medical care if:    · You cough up dark brown or bloody mucus (sputum).     · You have new or worse trouble breathing.     · You are dizzy or lightheaded, or you feel like you may faint.    Watch closely for changes in your health, and be sure to contact your doctor if:    · You have a new or higher fever.     · You are coughing more deeply or more often.     · You are not getting better after 2 days (48 hours).     · You do not get better as expected. Where can you learn more? Go to http://christiano-major.info/. Enter [id number] in the search box to learn more about \"Pneumonia: Care Instructions. \"  Current as of: September 5, 2018  Content Version: 11.9  © 1742-0044 [employer]. Care instructions adapted under license by [employer] (which disclaims liability or warranty for this information).  If you have questions about a medical condition or this instruction, always ask your healthcare professional. [place] [address] any warranty or liability for your use of this information.

## 2019-02-10 NOTE — ED NOTES
Reviewed CT scan with her + initial dose of antibiotics in our department. Overall, patient looks stable

## 2019-02-10 NOTE — ED NOTES
I have reviewed discharge instructions with the patient. The patient verbalized understanding. Patient left ED via Discharge Method: ambulatory to Home with self. Opportunity for questions and clarification provided. Patient given 1 scripts. To continue your aftercare when you leave the hospital, you may receive an automated call from our care team to check in on how you are doing. This is a free service and part of our promise to provide the best care and service to meet your aftercare needs.  If you have questions, or wish to unsubscribe from this service please call [phone]. Thank you for Choosing our [facility].

## 2020-03-06 ENCOUNTER — PATIENT OUTREACH (OUTPATIENT)
Dept: CASE MANAGEMENT | Age: 65
End: 2020-03-06

## 2020-03-06 NOTE — PROGRESS NOTES
LULU BARAJAS reached out to pt after receiving referral from PCP's office. Pt needs assistance with Medicare. No answer-LULU BARAJAS left VM. Will attempt again in 24 hours. This note will not be viewable in [address]

## 2020-03-09 ENCOUNTER — PATIENT OUTREACH (OUTPATIENT)
Dept: CASE MANAGEMENT | Age: 65
End: 2020-03-09

## 2020-03-09 NOTE — PROGRESS NOTES
SW CM left 2nd VM with pt regarding referral for Medicare assistance. Will attempt again in one week. This note will not be viewable in [address]

## 2020-03-16 ENCOUNTER — PATIENT OUTREACH (OUTPATIENT)
Dept: CASE MANAGEMENT | Age: 65
End: 2020-03-16

## 2020-03-23 ENCOUNTER — PATIENT OUTREACH (OUTPATIENT)
Dept: CASE MANAGEMENT | Age: 65
End: 2020-03-23

## 2021-11-24 ENCOUNTER — TELEPHONE (OUTPATIENT)
Dept: PHARMACY | Age: 66
End: 2021-11-24

## 2021-11-24 NOTE — TELEPHONE ENCOUNTER
CLINICAL PHARMACY: ADHERENCE REVIEW  Identified care gap per Maximino; fills at [facility]: ACE/ARB and Statin adherence    Last Visit: 2/15/21 - appears due for visit    ASSESSMENT  ACE/ARB ADHERENCE    Per Insurance Records through 11/21/21 (2020 [facility] = n/a; [facility] Potential Fail Date: 12/17/21):   Lisinopril-HCTZ 20-12.5 mg tab last filled on 8/22/21 for 90 day supply (180 tabs). Next refill due: 11/20/21    Per [facility]:   Dante Sarah last picked up on 8/22/21 for 90 day supply. Refills remaining, billed through [place]    BP Readings from Last 3 Encounters:   03/05/20 128/86   02/09/19 127/89   09/20/18 122/82     CrCl cannot be calculated (Patient's most recent lab result is older than the maximum 180 days allowed. ). [facility]    Per Insurance Records through 11/21/21 (2020 [facility] = n/a; YTD PDC = 83%; Potential Fail Date: 12/4/21): Atorvastatin 80 mg tab last filled on 7/25/21 for 90 day supply. Next refill due: 10/23/21    Per [facility]:   Atorvastatin last picked up on 7/25/21 for 90 day supply.  Refills remaining, billed through [email or web address]   Component Value Date/Time    Cholesterol, total 171 02/16/2021 10:06 AM    HDL Cholesterol 38 (L) 02/16/2021 10:06 AM    LDL, calculated 113 (H) 02/16/2021 10:06 AM    LDL, calculated 91 03/05/2020 12:36 PM    VLDL, calculated 20 02/16/2021 10:06 AM    VLDL, calculated 22 03/05/2020 12:36 PM    Triglyceride 112 02/16/2021 10:06 AM     ALT (SGPT)   Date Value Ref Range Status   02/16/2021 17 0 - 32 IU/L Final     AST (SGOT)   Date Value Ref Range Status   02/16/2021 25 0 - 40 IU/L Final     The 10-year ASCVD risk score (Shin Rowe., et al., 2013) is: 9.2%    Values used to calculate the score:      Age: 77 years      Sex: Female      Is Non- : Yes      Diabetic: No      Tobacco smoker: No      Systolic Blood Pressure: 095 mmHg      Is BP treated: Yes      HDL Cholesterol: 38 mg/dL      Total Cholesterol: 171 mg/dL     PLAN  The following are interventions that have been identified:  - Patient overdue refilling lisinopril-HCTZ & atorvatatin and active on home medication list    Unable to leave message    MyChart message sent    No future appointments.     Nuris Carmona, PharmD, [facility]  Department, toll free: [phone]

## 2021-11-24 NOTE — LETTER
[address]  [address], [address]        [place]   [address] [id number]           11/29/21     Dear Antonina Sims,     We tried to reach you recently regarding some of your prescriptions, but were unable to reach you on the telephone. We have on file that you are currently taking atorvastatin 80 mg tablet - 1 tablet every evening and lisinopril-hydrochlorothiazide 20-12.5 mg tablet - 1 tablet 2 times per day. If you are no longer taking or taking differently, please call us at the number below so that we can discuss this and update your medication profile.     We wanted to make sure you still have these medications at home and that you are not having any troubles with them. It appears that these medications may be due to be refilled.  Please contact us to discuss further or follow up with your [facility] pharmacy to refill them.     Sincerely,   Rod Young, PharmD, [facility]  Department, toll free: [phone]

## 2021-11-29 NOTE — TELEPHONE ENCOUNTER
Another attempt made to reach patient. No answer, LM. [employer] message not yet read at time of writing - will send letter.     For Pharmacy [id number] in place: No   Recommendation Provided To: Pharmacy: 2   Intervention Detail: Adherence Monitorin   Gap Closed?: No   Intervention Accepted By: Pharmacy: 2   Time Spent (min): 10

## 2021-11-30 ENCOUNTER — HOSPITAL ENCOUNTER (INPATIENT)
Age: 66
LOS: 6 days | Discharge: HOME HEALTH | DRG: 871 | End: 2021-12-06
Attending: EMERGENCY MEDICINE | Admitting: HOSPITALIST
Payer: MEDICARE

## 2021-11-30 ENCOUNTER — APPOINTMENT (OUTPATIENT)
Dept: GENERAL RADIOLOGY | Age: 66
DRG: 871 | End: 2021-11-30
Attending: EMERGENCY MEDICINE
Payer: MEDICARE

## 2021-11-30 ENCOUNTER — APPOINTMENT (OUTPATIENT)
Dept: CT IMAGING | Age: 66
DRG: 871 | End: 2021-11-30
Attending: EMERGENCY MEDICINE
Payer: MEDICARE

## 2021-11-30 DIAGNOSIS — R41.82 ALTERED MENTAL STATUS, UNSPECIFIED ALTERED MENTAL STATUS TYPE: ICD-10-CM

## 2021-11-30 DIAGNOSIS — J18.9 PNEUMONIA OF RIGHT LOWER LOBE DUE TO INFECTIOUS ORGANISM: Primary | ICD-10-CM

## 2021-11-30 DIAGNOSIS — R50.9 FEVER, UNSPECIFIED FEVER CAUSE: ICD-10-CM

## 2021-11-30 LAB
ALBUMIN SERPL-MCNC: 3.2 G/DL (ref 3.2–4.6)
ALBUMIN/GLOB SERPL: 0.7 {RATIO} (ref 1.2–3.5)
ALP SERPL-CCNC: 47 U/L (ref 50–136)
ALT SERPL-CCNC: 30 U/L (ref 12–65)
ANION GAP SERPL CALC-SCNC: 8 MMOL/L (ref 7–16)
AST SERPL-CCNC: 34 U/L (ref 15–37)
BASOPHILS # BLD: 0 K/UL (ref 0–0.2)
BASOPHILS NFR BLD: 0 % (ref 0–2)
BILIRUB SERPL-MCNC: 0.8 MG/DL (ref 0.2–1.1)
BUN SERPL-MCNC: 15 MG/DL (ref 8–23)
CALCIUM SERPL-MCNC: 8.6 MG/DL (ref 8.3–10.4)
CHLORIDE SERPL-SCNC: 106 MMOL/L (ref 98–107)
CO2 SERPL-SCNC: 26 MMOL/L (ref 21–32)
COVID-19 RAPID TEST, COVR: NOT DETECTED
CREAT SERPL-MCNC: 1.37 MG/DL (ref 0.6–1)
DIFFERENTIAL METHOD BLD: ABNORMAL
EOSINOPHIL # BLD: 0 K/UL (ref 0–0.8)
EOSINOPHIL NFR BLD: 0 % (ref 0.5–7.8)
ERYTHROCYTE [DISTWIDTH] IN BLOOD BY AUTOMATED COUNT: 12.8 % (ref 11.9–14.6)
FERRITIN SERPL-MCNC: 1088 NG/ML (ref 8–388)
FOLATE SERPL-MCNC: 15.6 NG/ML (ref 3.1–17.5)
GLOBULIN SER CALC-MCNC: 4.9 G/DL (ref 2.3–3.5)
GLUCOSE SERPL-MCNC: 121 MG/DL (ref 65–100)
HCT VFR BLD AUTO: 27.9 % (ref 35.8–46.3)
HGB BLD-MCNC: 9.1 G/DL (ref 11.7–15.4)
IMM GRANULOCYTES # BLD AUTO: 0 K/UL (ref 0–0.5)
IMM GRANULOCYTES NFR BLD AUTO: 0 % (ref 0–5)
IRON SATN MFR SERPL: 11 %
IRON SERPL-MCNC: 29 UG/DL (ref 35–150)
LACTATE SERPL-SCNC: 0.8 MMOL/L (ref 0.4–2)
LYMPHOCYTES # BLD: 1.1 K/UL (ref 0.5–4.6)
LYMPHOCYTES NFR BLD: 18 % (ref 13–44)
MAGNESIUM SERPL-MCNC: 2 MG/DL (ref 1.8–2.4)
MCH RBC QN AUTO: 33.1 PG (ref 26.1–32.9)
MCHC RBC AUTO-ENTMCNC: 32.6 G/DL (ref 31.4–35)
MCV RBC AUTO: 101.5 FL (ref 79.6–97.8)
MONOCYTES # BLD: 0.4 K/UL (ref 0.1–1.3)
MONOCYTES NFR BLD: 7 % (ref 4–12)
NEUTS SEG # BLD: 4.7 K/UL (ref 1.7–8.2)
NEUTS SEG NFR BLD: 75 % (ref 43–78)
NRBC # BLD: 0 K/UL (ref 0–0.2)
PLATELET # BLD AUTO: 132 K/UL (ref 150–450)
PMV BLD AUTO: 9.6 FL (ref 9.4–12.3)
POTASSIUM SERPL-SCNC: 3.1 MMOL/L (ref 3.5–5.1)
PROCALCITONIN SERPL-MCNC: 0.21 NG/ML (ref 0–0.49)
PROT SERPL-MCNC: 8.1 G/DL (ref 6.3–8.2)
RBC # BLD AUTO: 2.75 M/UL (ref 4.05–5.2)
SODIUM SERPL-SCNC: 140 MMOL/L (ref 136–145)
SOURCE, COVRS: NORMAL
TIBC SERPL-MCNC: 273 UG/DL (ref 250–450)
TRANSFERRIN SERPL-MCNC: 180 MG/DL (ref 202–364)
TSH SERPL DL<=0.005 MIU/L-ACNC: 69.7 UIU/ML (ref 0.36–3.74)
VIT B12 SERPL-MCNC: 520 PG/ML (ref 193–986)
WBC # BLD AUTO: 6.2 K/UL (ref 4.3–11.1)

## 2021-11-30 PROCEDURE — 74011250637 HC RX REV CODE- 250/637: Performed by: HOSPITALIST

## 2021-11-30 PROCEDURE — 87040 BLOOD CULTURE FOR BACTERIA: CPT

## 2021-11-30 PROCEDURE — 99285 EMERGENCY DEPT VISIT HI MDM: CPT

## 2021-11-30 PROCEDURE — 83605 ASSAY OF LACTIC ACID: CPT

## 2021-11-30 PROCEDURE — 71045 X-RAY EXAM CHEST 1 VIEW: CPT

## 2021-11-30 PROCEDURE — 82728 ASSAY OF FERRITIN: CPT

## 2021-11-30 PROCEDURE — 80053 COMPREHEN METABOLIC PANEL: CPT

## 2021-11-30 PROCEDURE — 74011250637 HC RX REV CODE- 250/637: Performed by: EMERGENCY MEDICINE

## 2021-11-30 PROCEDURE — 87635 SARS-COV-2 COVID-19 AMP PRB: CPT

## 2021-11-30 PROCEDURE — 96365 THER/PROPH/DIAG IV INF INIT: CPT

## 2021-11-30 PROCEDURE — 96361 HYDRATE IV INFUSION ADD-ON: CPT

## 2021-11-30 PROCEDURE — 82746 ASSAY OF FOLIC ACID SERUM: CPT

## 2021-11-30 PROCEDURE — 94762 N-INVAS EAR/PLS OXIMTRY CONT: CPT

## 2021-11-30 PROCEDURE — 74011000258 HC RX REV CODE- 258: Performed by: EMERGENCY MEDICINE

## 2021-11-30 PROCEDURE — 74011000250 HC RX REV CODE- 250: Performed by: HOSPITALIST

## 2021-11-30 PROCEDURE — 96375 TX/PRO/DX INJ NEW DRUG ADDON: CPT

## 2021-11-30 PROCEDURE — 65270000029 HC RM PRIVATE

## 2021-11-30 PROCEDURE — 84145 PROCALCITONIN (PCT): CPT

## 2021-11-30 PROCEDURE — 87086 URINE CULTURE/COLONY COUNT: CPT

## 2021-11-30 PROCEDURE — 84443 ASSAY THYROID STIM HORMONE: CPT

## 2021-11-30 PROCEDURE — 82607 VITAMIN B-12: CPT

## 2021-11-30 PROCEDURE — 86580 TB INTRADERMAL TEST: CPT | Performed by: HOSPITALIST

## 2021-11-30 PROCEDURE — 96368 THER/DIAG CONCURRENT INF: CPT

## 2021-11-30 PROCEDURE — 70450 CT HEAD/BRAIN W/O DYE: CPT

## 2021-11-30 PROCEDURE — 93005 ELECTROCARDIOGRAM TRACING: CPT | Performed by: EMERGENCY MEDICINE

## 2021-11-30 PROCEDURE — 83540 ASSAY OF IRON: CPT

## 2021-11-30 PROCEDURE — 85025 COMPLETE CBC W/AUTO DIFF WBC: CPT

## 2021-11-30 PROCEDURE — 74011250636 HC RX REV CODE- 250/636: Performed by: EMERGENCY MEDICINE

## 2021-11-30 PROCEDURE — 81003 URINALYSIS AUTO W/O SCOPE: CPT

## 2021-11-30 PROCEDURE — 83735 ASSAY OF MAGNESIUM: CPT

## 2021-11-30 RX ORDER — SENNOSIDES 8.6 MG/1
2 TABLET ORAL
Status: DISCONTINUED | OUTPATIENT
Start: 2021-11-30 | End: 2021-12-06 | Stop reason: HOSPADM

## 2021-11-30 RX ORDER — IPRATROPIUM BROMIDE AND ALBUTEROL SULFATE 2.5; .5 MG/3ML; MG/3ML
3 SOLUTION RESPIRATORY (INHALATION)
Status: DISCONTINUED | OUTPATIENT
Start: 2021-11-30 | End: 2021-12-06 | Stop reason: HOSPADM

## 2021-11-30 RX ORDER — ACETAMINOPHEN 325 MG/1
650 TABLET ORAL
Status: DISCONTINUED | OUTPATIENT
Start: 2021-11-30 | End: 2021-12-06 | Stop reason: HOSPADM

## 2021-11-30 RX ORDER — EXEMESTANE 25 MG/1
25 TABLET ORAL DAILY
Status: DISCONTINUED | OUTPATIENT
Start: 2021-12-01 | End: 2021-12-06 | Stop reason: HOSPADM

## 2021-11-30 RX ORDER — ACETAMINOPHEN 650 MG/1
650 SUPPOSITORY RECTAL
Status: DISCONTINUED | OUTPATIENT
Start: 2021-11-30 | End: 2021-12-06 | Stop reason: HOSPADM

## 2021-11-30 RX ORDER — SODIUM CHLORIDE 0.9 % (FLUSH) 0.9 %
5-10 SYRINGE (ML) INJECTION AS NEEDED
Status: DISCONTINUED | OUTPATIENT
Start: 2021-11-30 | End: 2021-12-06 | Stop reason: HOSPADM

## 2021-11-30 RX ORDER — CLONIDINE HYDROCHLORIDE 0.1 MG/1
0.2 TABLET ORAL
Status: DISCONTINUED | OUTPATIENT
Start: 2021-11-30 | End: 2021-12-06 | Stop reason: HOSPADM

## 2021-11-30 RX ORDER — LEVOTHYROXINE SODIUM 150 UG/1
150 TABLET ORAL
Status: DISCONTINUED | OUTPATIENT
Start: 2021-12-01 | End: 2021-12-06 | Stop reason: HOSPADM

## 2021-11-30 RX ORDER — POTASSIUM CHLORIDE 29.8 MG/ML
20 INJECTION INTRAVENOUS
Status: COMPLETED | OUTPATIENT
Start: 2021-11-30 | End: 2021-11-30

## 2021-11-30 RX ORDER — AZITHROMYCIN 250 MG/1
500 TABLET, FILM COATED ORAL DAILY
Status: COMPLETED | OUTPATIENT
Start: 2021-12-01 | End: 2021-12-05

## 2021-11-30 RX ORDER — POTASSIUM CHLORIDE 20 MEQ/1
40 TABLET, EXTENDED RELEASE ORAL
Status: COMPLETED | OUTPATIENT
Start: 2021-11-30 | End: 2021-11-30

## 2021-11-30 RX ORDER — HYDRALAZINE HYDROCHLORIDE 20 MG/ML
10 INJECTION INTRAMUSCULAR; INTRAVENOUS
Status: DISCONTINUED | OUTPATIENT
Start: 2021-11-30 | End: 2021-12-06 | Stop reason: HOSPADM

## 2021-11-30 RX ORDER — SODIUM CHLORIDE 0.9 % (FLUSH) 0.9 %
5-40 SYRINGE (ML) INJECTION EVERY 8 HOURS
Status: DISCONTINUED | OUTPATIENT
Start: 2021-11-30 | End: 2021-12-06 | Stop reason: HOSPADM

## 2021-11-30 RX ORDER — ACETAMINOPHEN 500 MG
1000 TABLET ORAL
Status: COMPLETED | OUTPATIENT
Start: 2021-11-30 | End: 2021-11-30

## 2021-11-30 RX ORDER — SODIUM CHLORIDE 0.9 % (FLUSH) 0.9 %
5-40 SYRINGE (ML) INJECTION AS NEEDED
Status: DISCONTINUED | OUTPATIENT
Start: 2021-11-30 | End: 2021-12-06 | Stop reason: HOSPADM

## 2021-11-30 RX ORDER — CARVEDILOL 6.25 MG/1
6.25 TABLET ORAL 2 TIMES DAILY WITH MEALS
Status: DISCONTINUED | OUTPATIENT
Start: 2021-12-01 | End: 2021-12-01

## 2021-11-30 RX ORDER — ONDANSETRON 2 MG/ML
4 INJECTION INTRAMUSCULAR; INTRAVENOUS
Status: DISCONTINUED | OUTPATIENT
Start: 2021-11-30 | End: 2021-12-06 | Stop reason: HOSPADM

## 2021-11-30 RX ORDER — SODIUM CHLORIDE 0.9 % (FLUSH) 0.9 %
5-10 SYRINGE (ML) INJECTION EVERY 8 HOURS
Status: DISCONTINUED | OUTPATIENT
Start: 2021-11-30 | End: 2021-12-06 | Stop reason: HOSPADM

## 2021-11-30 RX ADMIN — ACETAMINOPHEN 1000 MG: 500 TABLET ORAL at 13:41

## 2021-11-30 RX ADMIN — SODIUM CHLORIDE 1000 ML: 900 INJECTION, SOLUTION INTRAVENOUS at 13:41

## 2021-11-30 RX ADMIN — Medication 10 ML: at 23:26

## 2021-11-30 RX ADMIN — AZITHROMYCIN MONOHYDRATE 500 MG: 500 INJECTION, POWDER, LYOPHILIZED, FOR SOLUTION INTRAVENOUS at 14:55

## 2021-11-30 RX ADMIN — SODIUM CHLORIDE 319 ML: 900 INJECTION, SOLUTION INTRAVENOUS at 18:24

## 2021-11-30 RX ADMIN — CEFTRIAXONE 2 G: 2 INJECTION, POWDER, FOR SOLUTION INTRAMUSCULAR; INTRAVENOUS at 15:29

## 2021-11-30 RX ADMIN — POTASSIUM CHLORIDE 20 MEQ: 400 INJECTION, SOLUTION INTRAVENOUS at 16:46

## 2021-11-30 RX ADMIN — SODIUM CHLORIDE 1000 ML: 900 INJECTION, SOLUTION INTRAVENOUS at 15:27

## 2021-11-30 RX ADMIN — Medication 10 ML: at 23:25

## 2021-11-30 RX ADMIN — TUBERCULIN PURIFIED PROTEIN DERIVATIVE 5 UNITS: 5 INJECTION, SOLUTION INTRADERMAL at 23:45

## 2021-11-30 RX ADMIN — POTASSIUM CHLORIDE 40 MEQ: 20 TABLET, EXTENDED RELEASE ORAL at 23:25

## 2021-11-30 NOTE — ED PROVIDER NOTES
78-year-old female with history of CAD, breast cancer, GERD presents via EMS with complaint of generalized weakness, altered mental status per son. Patient was last seen at baseline on yesterday. Patient was noted to be febrile in route by EMS. Patient was given Zosyn in route as well as 800 normal saline IV fluid bolus. Blood cultures were obtained. Patient noted to be malodorous. Patient poor historian. The history is provided by the patient and the EMS personnel. No  was used. Blood infection   Associated symptoms include cough. Pertinent negatives include no chest pain, no diarrhea, no vomiting, no congestion, no shortness of breath, no neck pain and no rash.         Past Medical History:   Diagnosis Date    Anxiety disorder     takes zoloft prn    Atrial fibrillation (HCC)     pt states not aware of this dx; will obtain cardiac records    Breast cancer ([facility] [address] 02/09/2016    Rt Mastectomy    CAD (coronary artery disease) 2006    triple bypass; pt is followed by Dr. Janell Galicia [facility]) dx 1997    right breast cancer; chemotherapy     Depression     managed w/med    Dyspepsia and other specified disorders of function of stomach     pt states symptoms are better 2/3/16    Environmental allergies     takes allegra prn    GERD (gastroesophageal reflux disease)     takes zantac prn    Headache     pt states not consistent; happens prn    Hx of iron deficiency anemia     had blood transfusion 11/15    Hypercholesterolemia     pt not taking medication    Hypertension     managed w/med    Prediabetes     Primary hyperparathyroidism ([facility] [address]     s/p parathyroidectomy; takes medication daily    PUD (peptic ulcer disease)     Patient denies    Stool color black     pt states due to previous chemo; pt states no current issues    Thyroid disease     s/p thyroidectomy; takes medication daily       Past Surgical History:   Procedure Laterality Date    HX BREAST LUMPECTOMY Right 1997    breast cancer     HX CARPAL TUNNEL RELEASE Right     HX COLONOSCOPY  2005    HX HYSTERECTOMY      HX MASTECTOMY Right 2/9/2016    BREAST MASTECTOMY SIMPLE RIGHT  performed by Rebeca Adams MD at [address] [place] [address] HX PARATHYROIDECTOMY  2007    HX THYROIDECTOMY  2007    HX TUBAL LIGATION      HX VASCULAR ACCESS  6/15    left chest wall life port for chemo    OR CABG, ARTERY-VEIN, THREE  2006    cardiac stent placed prior to sx         Family History:   Problem Relation Age of Onset    Heart Disease Mother     Hypertension Mother     Cancer Father         colon    Breast Cancer Neg Hx        Social History     Socioeconomic History    Marital status:      Spouse name: Not on file    Number of children: Not on file    Years of education: Not on file    Highest education level: Not on file   Occupational History    Not on file   Tobacco Use    Smoking status: Never Smoker    Smokeless tobacco: Never Used   Substance and Sexual Activity    Alcohol use: No     Alcohol/week: 0.0 standard drinks    Drug use: No    Sexual activity: Not on file   Other Topics Concern    Not on file   Social History Narrative    Not on file     Social Determinants of Health     Financial Resource Strain:     Difficulty of Paying Living Expenses: Not on file   Food Insecurity:     Worried About [address] in the Last Year: Not on file    Luis of Food in the Last Year: Not on file   Transportation Needs:     Lack of Transportation (Medical): Not on file    Lack of Transportation (Non-Medical):  Not on file   Physical Activity:     Days of Exercise per Week: Not on file    Minutes of Exercise per Session: Not on file   Stress:     Feeling of Stress : Not on file   Social Connections:     Frequency of Communication with Friends and Family: Not on file    Frequency of Social Gatherings with Friends and Family: Not on file    Attends Zoroastrian Services: Not on file   [employer] of Clubs or Organizations: Not on file    Attends Club or Organization Meetings: Not on file    Marital Status: Not on file   Intimate Partner Violence:     Fear of Current or Ex-Partner: Not on file    Emotionally Abused: Not on file    Physically Abused: Not on file    Sexually Abused: Not on file   Housing Stability:     Unable to Pay for Housing in the Last Year: Not on file    Number of [place] in the Last Year: Not on file    Unstable Housing in the Last Year: Not on file         ALLERGIES: Codeine    Review of Systems   Unable to perform ROS: Mental status change   Constitutional: Positive for chills, fatigue and fever. HENT: Negative for congestion and rhinorrhea. Respiratory: Positive for cough. Negative for shortness of breath. Cardiovascular: Negative for chest pain. Gastrointestinal: Negative for abdominal pain, diarrhea, nausea and vomiting. Genitourinary: Negative for flank pain. Musculoskeletal: Negative for myalgias, neck pain and neck stiffness. Skin: Negative for rash. Neurological: Negative for dizziness, seizures, syncope and speech difficulty. Hematological: Does not bruise/bleed easily. Psychiatric/Behavioral: Positive for confusion. Vitals:    11/30/21 1310 11/30/21 1455 11/30/21 1456 11/30/21 1457   BP: (!) 162/81 (!) 142/84     Pulse: 78 71     Resp: 18 10     Temp: (!) 101.4 °F (38.6 °C)   99.8 °F (37.7 °C)   SpO2: 96%  98%    Weight:       Height:                Physical Exam  Vitals and nursing note reviewed. Constitutional:       Comments: Malodorous. Ill-appearing. HENT:      Head: Normocephalic. Comments: Atraumatic. No finding suggestive of basilar skull fracture. Nose: Nose normal.      Mouth/Throat:      Mouth: Mucous membranes are moist.   Eyes:      Conjunctiva/sclera: Conjunctivae normal.      Pupils: Pupils are equal, round, and reactive to light. Neck:      Comments: No nuchal rigidity.   Cardiovascular:      Rate and Rhythm: Normal rate. Pulses: Normal pulses. Heart sounds: Normal heart sounds. Pulmonary:      Effort: Pulmonary effort is normal.   Abdominal:      General: Bowel sounds are normal.      Palpations: Abdomen is soft. Tenderness: There is no abdominal tenderness. There is no guarding or rebound. Musculoskeletal:         General: No tenderness or deformity. Normal range of motion. Cervical back: Normal range of motion. No rigidity. Skin:     General: Skin is warm. Findings: No erythema or rash. Neurological:      Mental Status: She is alert. Sensory: No sensory deficit. Motor: No weakness. Comments: Confused. Moving all extremities equally. Strength 5 out of 5 throughout. No facial droop. MDM  Number of Diagnoses or Management Options  Fever, unspecified fever cause: new and requires workup  Pneumonia of right lower lobe due to infectious organism: new and requires workup  Diagnosis management comments: Febrile. Chest ray with right basilar air space opacity concerning for pneumonia. Patient given Zosyn in route. Is Rocephin, Zithromax ordered. IV fluid hydration ordered. Will consult hospitalist for admission. Rapid Covid negative. Amount and/or Complexity of Data Reviewed  Clinical lab tests: ordered and reviewed  Tests in the radiology section of CPT®: ordered and reviewed  Tests in the medicine section of CPT®: ordered and reviewed  Review and summarize past medical records: yes  Independent visualization of images, tracings, or specimens: yes    Risk of Complications, Morbidity, and/or Mortality  Presenting problems: high  Diagnostic procedures: high  Management options: high    Patient Progress  Patient progress: stable    ED Course as of 11/30/21 1549   Tue Nov 30, 2021   1412 CXR IMPRESSION  Mild right basilar airspace opacity worrisome for pneumonia in the  proper clinical setting.  [DF]      ED Course User Index  [DF] Pipe Hancock Franklin Amin MD       EKG    Date/Time: 11/30/2021 3:55 PM  Performed by: Meng Carranza MD  Authorized by: Meng Carranza MD     ECG reviewed by ED Physician in the absence of a cardiologist: yes    Rate:     ECG rate:  77    ECG rate assessment: normal    Rhythm:     Rhythm: sinus rhythm    Ectopy:     Ectopy: none    QRS:     QRS axis:  Normal    QRS intervals:  Normal  Conduction:     Conduction: normal    ST segments:     ST segments:  Normal  T waves:     T waves: normal            Results Include:    Recent Results (from the past 24 hour(s))   CBC WITH AUTOMATED DIFF    Collection Time: 11/30/21  1:24 PM   Result Value Ref Range    WBC 6.2 4.3 - 11.1 K/uL    RBC 2.75 (L) 4.05 - 5.2 M/uL    HGB 9.1 (L) 11.7 - 15.4 g/dL    HCT 27.9 (L) 35.8 - 46.3 %    .5 (H) 79.6 - 97.8 FL    MCH 33.1 (H) 26.1 - 32.9 PG    MCHC 32.6 31.4 - 35.0 g/dL    RDW 12.8 11.9 - 14.6 %    PLATELET 243 (L) 388 - 450 K/uL    MPV 9.6 9.4 - 12.3 FL    ABSOLUTE NRBC 0.00 0.0 - 0.2 K/uL    NEUTROPHILS 75 43 - 78 %    LYMPHOCYTES 18 13 - 44 %    MONOCYTES 7 4.0 - 12.0 %    EOSINOPHILS 0 (L) 0.5 - 7.8 %    BASOPHILS 0 0.0 - 2.0 %    IMMATURE GRANULOCYTES 0 0.0 - 5.0 %    ABS. NEUTROPHILS 4.7 1.7 - 8.2 K/UL    ABS. LYMPHOCYTES 1.1 0.5 - 4.6 K/UL    ABS. MONOCYTES 0.4 0.1 - 1.3 K/UL    ABS. EOSINOPHILS 0.0 0.0 - 0.8 K/UL    ABS. BASOPHILS 0.0 0.0 - 0.2 K/UL    ABS. IMM.  GRANS. 0.0 0.0 - 0.5 K/UL    DF AUTOMATED     METABOLIC PANEL, COMPREHENSIVE    Collection Time: 11/30/21  1:24 PM   Result Value Ref Range    Sodium 140 136 - 145 mmol/L    Potassium 3.1 (L) 3.5 - 5.1 mmol/L    Chloride 106 98 - 107 mmol/L    CO2 26 21 - 32 mmol/L    Anion gap 8 7 - 16 mmol/L    Glucose 121 (H) 65 - 100 mg/dL    BUN 15 8 - 23 MG/DL    Creatinine 1.37 (H) 0.6 - 1.0 MG/DL    GFR est AA 49 (L) >60 ml/min/1.73m2    GFR est non-AA 41 (L) >60 ml/min/1.73m2    Calcium 8.6 8.3 - 10.4 MG/DL    Bilirubin, total 0.8 0.2 - 1.1 MG/DL    ALT (SGPT) 30 12 - 65 U/L    AST (SGOT) 34 15 - 37 U/L    Alk. phosphatase 47 (L) 50 - 136 U/L    Protein, total 8.1 6.3 - 8.2 g/dL    Albumin 3.2 3.2 - 4.6 g/dL    Globulin 4.9 (H) 2.3 - 3.5 g/dL    A-G Ratio 0.7 (L) 1.2 - 3.5     PROCALCITONIN    Collection Time: 11/30/21  1:24 PM   Result Value Ref Range    Procalcitonin 0.21 0.00 - 0.49 ng/mL   LACTIC ACID    Collection Time: 11/30/21  1:25 PM   Result Value Ref Range    Lactic acid 0.8 0.4 - 2.0 MMOL/L   COVID-19 RAPID TEST    Collection Time: 11/30/21  2:00 PM   Result Value Ref Range    Specimen source Nasopharyngeal      COVID-19 rapid test Not detected DREADD       Bob Marti MD; 11/30/2021 @3:55 PM Voice dictation software was used during the making of this note. This software is not perfect and grammatical and other typographical errors may be present.   This note has not been proofread for errors.  ===================================================================

## 2021-11-30 NOTE — H&P
INTERNAL MEDICINE H&P/CONSULT    Subjective:     78-year-old female with history of hypothyroidism, CABG, afib, PUD and Gi bleeding, HTN, prediabetes, breast cancer, presents w/ generalized weakness, altered mental status per son strated today. Patient was noted to be febrile in route by EMS. Patient was given Zosyn in route as well as 800 normal saline IV fluid bolus. Blood cultures were obtained. Patient noted to be malodorous. Patient poor historian. On further questioning, pt is confused, not in reps distress, denies resp spx, on room air. Son has left earlier.     Past Medical History:   Diagnosis Date    Anxiety disorder     takes zoloft prn    Atrial fibrillation (HCC)     pt states not aware of this dx; will obtain cardiac records    Breast cancer ([facility] 02/09/2016    Rt Mastectomy    CAD (coronary artery disease) 2006    triple bypass; pt is followed by Dr. Amarilys Johnson [facility]) dx 1997    right breast cancer; chemotherapy     Depression     managed w/med    Dyspepsia and other specified disorders of function of stomach     pt states symptoms are better 2/3/16    Environmental allergies     takes allegra prn    GERD (gastroesophageal reflux disease)     takes zantac prn    Headache     pt states not consistent; happens prn    Hx of iron deficiency anemia     had blood transfusion 11/15    Hypercholesterolemia     pt not taking medication    Hypertension     managed w/med    Prediabetes     Primary hyperparathyroidism ([facility]     s/p parathyroidectomy; takes medication daily    PUD (peptic ulcer disease)     Patient denies    Stool color black     pt states due to previous chemo; pt states no current issues    Thyroid disease     s/p thyroidectomy; takes medication daily      Past Surgical History:   Procedure Laterality Date    HX BREAST LUMPECTOMY Right 1997    breast cancer     HX CARPAL TUNNEL RELEASE Right     HX COLONOSCOPY  2005    HX HYSTERECTOMY      HX MASTECTOMY Right 2/9/2016    BREAST MASTECTOMY SIMPLE RIGHT  performed by Nessa Suazo MD at [address] HX PARATHYROIDECTOMY  2007    HX THYROIDECTOMY  2007    HX TUBAL LIGATION      HX VASCULAR ACCESS  6/15    left chest wall life port for chemo    IN CABG, ARTERY-VEIN, THREE  2006    cardiac stent placed prior to sx      Prior to Admission medications    Medication Sig Start Date End Date Taking? Authorizing Provider   famotidine (PEPCID) 40 mg tablet Take 1 Tab by mouth daily. 5/3/21   Antonio Marrero NP   felodipine (PLENDIL SR) 10 mg 24 hr tablet Take 1 Tab by mouth daily. 5/3/21   Antonio Marrero NP   lisinopril-hydroCHLOROthiazide (PRINZIDE, ZESTORETIC) 20-12.5 mg per tablet TAKE 1 TABLET BY MOUTH TWICE DAILY 5/3/21   Antonio Marrero NP   carvediloL (COREG) 12.5 mg tablet TAKE 1 TABLET BY MOUTH EVERY MORNING 5/3/21   Antonio Marrero NP   sertraline (ZOLOFT) 100 mg tablet TAKE 1 AND 1/2 TABLETS BY MOUTH EVERY DAY 5/3/21   Antonio Marrero NP   ezetimibe (ZETIA) 10 mg tablet TAKE 1 TABLET BY MOUTH DAILY 3/26/21   Antonio Marrero NP   levothyroxine (SYNTHROID) 150 mcg tablet Take 1 Tab by mouth Daily (before breakfast). 3/26/21   Antonio Marrero NP   potassium chloride SA (MICRO-K) 10 mEq capsule TAKE 1 CAPSULE BY MOUTH DAILY 12/3/20   Nessa Page P, DO   atorvastatin (LIPITOR) 80 mg tablet TAKE 1 TABLET BY MOUTH EVERY NIGHT 12/3/20   Nessa Garcia P, DO   exemestane (AROMASIN) 25 mg tablet 25 mg every morning. Indications: prevention of breast cancer in high risk women 6/8/17   Provider, Historical   MV, MIN #36/IRON,CARBONYL/FA (GERITOL COMPLETE PO) Take 1 Tab by mouth every other day. Pt states also has liquid form. Provider, Historical   cholecalciferol, vitamin D3, (VITAMIN D3) 4,000 unit cap Take 1 Cap by mouth every other day.     Provider, Historical     Allergies   Allergen Reactions    Codeine Rash      Social History     Tobacco Use    Smoking status: Never Smoker    Smokeless tobacco: Never Used   Substance Use Topics    Alcohol use: No     Alcohol/week: 0.0 standard drinks        Family History:  HTN    Review of Systems   Unclear due to mental status    Objective: Intake / Output:  11/30 0701 - 11/30 1900  In: 300 [I.V.:300]  Out: -   No intake/output data recorded. Physical Exam:  Visit Vitals  BP (!) 142/84   Pulse 71   Temp 99.8 °F (37.7 °C)   Resp 10   Ht 5' 10\" (1.778 m)   Wt 77.3 kg (170 lb 6.4 oz)   SpO2 98%   BMI 24.45 kg/m²     General appearance: awake, alert, cooperative, mild distress, appears stated age - Pale, No icterus, Dry mucous membranes, Disoriented. Head: Normocephalic, without obvious abnormality, atraumatic  Back: symmetric, no curvature. ROM normal. No CVA tenderness. Lungs: diminished bs bibasilar  CV: S1, S2 normal, no murmur, click, rub or gallop. -   Abdomen: soft, no tenderness, no distension, normal bowel sound, no masses, no organomegaly  Extremities: atraumatic, no cyanosis - Bilateral lower limbs edema none. Skin: No rashes or ulceration. Neurologic: Cranial nerves     ECG: sinus rhythm     Data Review (Labs):   Recent Results (from the past 24 hour(s))   CBC WITH AUTOMATED DIFF    Collection Time: 11/30/21  1:24 PM   Result Value Ref Range    WBC 6.2 4.3 - 11.1 K/uL    RBC 2.75 (L) 4.05 - 5.2 M/uL    HGB 9.1 (L) 11.7 - 15.4 g/dL    HCT 27.9 (L) 35.8 - 46.3 %    .5 (H) 79.6 - 97.8 FL    MCH 33.1 (H) 26.1 - 32.9 PG    MCHC 32.6 31.4 - 35.0 g/dL    RDW 12.8 11.9 - 14.6 %    PLATELET 716 (L) 379 - 450 K/uL    MPV 9.6 9.4 - 12.3 FL    ABSOLUTE NRBC 0.00 0.0 - 0.2 K/uL    NEUTROPHILS 75 43 - 78 %    LYMPHOCYTES 18 13 - 44 %    MONOCYTES 7 4.0 - 12.0 %    EOSINOPHILS 0 (L) 0.5 - 7.8 %    BASOPHILS 0 0.0 - 2.0 %    IMMATURE GRANULOCYTES 0 0.0 - 5.0 %    ABS. NEUTROPHILS 4.7 1.7 - 8.2 K/UL    ABS. LYMPHOCYTES 1.1 0.5 - 4.6 K/UL    ABS. MONOCYTES 0.4 0.1 - 1.3 K/UL    ABS. EOSINOPHILS 0.0 0.0 - 0.8 K/UL    ABS. BASOPHILS 0.0 0.0 - 0.2 K/UL    ABS. IMM. GRANS. 0.0 0.0 - 0.5 K/UL    DF AUTOMATED     METABOLIC PANEL, COMPREHENSIVE    Collection Time: 11/30/21  1:24 PM   Result Value Ref Range    Sodium 140 136 - 145 mmol/L    Potassium 3.1 (L) 3.5 - 5.1 mmol/L    Chloride 106 98 - 107 mmol/L    CO2 26 21 - 32 mmol/L    Anion gap 8 7 - 16 mmol/L    Glucose 121 (H) 65 - 100 mg/dL    BUN 15 8 - 23 MG/DL    Creatinine 1.37 (H) 0.6 - 1.0 MG/DL    GFR est AA 49 (L) >60 ml/min/1.73m2    GFR est non-AA 41 (L) >60 ml/min/1.73m2    Calcium 8.6 8.3 - 10.4 MG/DL    Bilirubin, total 0.8 0.2 - 1.1 MG/DL    ALT (SGPT) 30 12 - 65 U/L    AST (SGOT) 34 15 - 37 U/L    Alk. phosphatase 47 (L) 50 - 136 U/L    Protein, total 8.1 6.3 - 8.2 g/dL    Albumin 3.2 3.2 - 4.6 g/dL    Globulin 4.9 (H) 2.3 - 3.5 g/dL    A-G Ratio 0.7 (L) 1.2 - 3.5     PROCALCITONIN    Collection Time: 11/30/21  1:24 PM   Result Value Ref Range    Procalcitonin 0.21 0.00 - 0.49 ng/mL   LACTIC ACID    Collection Time: 11/30/21  1:25 PM   Result Value Ref Range    Lactic acid 0.8 0.4 - 2.0 MMOL/L   COVID-19 RAPID TEST    Collection Time: 11/30/21  2:00 PM   Result Value Ref Range    Specimen source Nasopharyngeal      COVID-19 rapid test Not detected NOTD         Assessment:     1- Acute metabolic encephalopathy, hx of hypothyroidism w/ , CT head negative  2- Possible CAP (community acquired pneumonia) of RLL  3- Hypokalemia, replaced iv and po  4- History of CABG, afib, PUD and Gi bleeding, HTN, prediabetes, breast cancer. Stable. 5- macrocytic anemia,     Plan:     Rocephin IV  Azithromycin  Follow cultures, anemia work up, TSH  Abx to taper w/ clinical progress  PPD placed  IVF hydration, gentle  Blood pressure control  AM lab  Continue essential home medications. Patient is full code. Further management depends on patient progress. Thank you for the oppourtinity to contribute in the care of your patient.     Signed By: Gayla Mejias MD     November 30, 2021

## 2021-11-30 NOTE — ED TRIAGE NOTES
Pt arrives masked via EMS, per EMS, pt c/o general weakness and AMS per son, baseline A& O x 4, GLF yesterday. Temp 101.4, HR 98, /81. 4.5 G zosyn given, 800 ml NS, blood cultures obtained en route, 20 g IV.

## 2021-12-01 LAB
ANION GAP SERPL CALC-SCNC: 8 MMOL/L (ref 7–16)
BUN SERPL-MCNC: 15 MG/DL (ref 8–23)
CALCIUM SERPL-MCNC: 8.3 MG/DL (ref 8.3–10.4)
CHLORIDE SERPL-SCNC: 107 MMOL/L (ref 98–107)
CO2 SERPL-SCNC: 25 MMOL/L (ref 21–32)
CREAT SERPL-MCNC: 1.17 MG/DL (ref 0.6–1)
ERYTHROCYTE [DISTWIDTH] IN BLOOD BY AUTOMATED COUNT: 12.7 % (ref 11.9–14.6)
GLUCOSE SERPL-MCNC: 94 MG/DL (ref 65–100)
HCT VFR BLD AUTO: 26.6 % (ref 35.8–46.3)
HGB BLD-MCNC: 8.9 G/DL (ref 11.7–15.4)
MCH RBC QN AUTO: 33.7 PG (ref 26.1–32.9)
MCHC RBC AUTO-ENTMCNC: 33.5 G/DL (ref 31.4–35)
MCV RBC AUTO: 100.8 FL (ref 79.6–97.8)
NRBC # BLD: 0 K/UL (ref 0–0.2)
PLATELET # BLD AUTO: 142 K/UL (ref 150–450)
PMV BLD AUTO: 10.1 FL (ref 9.4–12.3)
POTASSIUM SERPL-SCNC: 3.5 MMOL/L (ref 3.5–5.1)
RBC # BLD AUTO: 2.64 M/UL (ref 4.05–5.2)
SODIUM SERPL-SCNC: 140 MMOL/L (ref 136–145)
WBC # BLD AUTO: 5.6 K/UL (ref 4.3–11.1)

## 2021-12-01 PROCEDURE — 74011250636 HC RX REV CODE- 250/636: Performed by: HOSPITALIST

## 2021-12-01 PROCEDURE — 97530 THERAPEUTIC ACTIVITIES: CPT

## 2021-12-01 PROCEDURE — 74011000258 HC RX REV CODE- 258: Performed by: HOSPITALIST

## 2021-12-01 PROCEDURE — 97165 OT EVAL LOW COMPLEX 30 MIN: CPT

## 2021-12-01 PROCEDURE — 80048 BASIC METABOLIC PNL TOTAL CA: CPT

## 2021-12-01 PROCEDURE — 36415 COLL VENOUS BLD VENIPUNCTURE: CPT

## 2021-12-01 PROCEDURE — 74011250637 HC RX REV CODE- 250/637: Performed by: HOSPITALIST

## 2021-12-01 PROCEDURE — 65270000029 HC RM PRIVATE

## 2021-12-01 PROCEDURE — 85027 COMPLETE CBC AUTOMATED: CPT

## 2021-12-01 PROCEDURE — 74011250637 HC RX REV CODE- 250/637: Performed by: INTERNAL MEDICINE

## 2021-12-01 RX ORDER — CARVEDILOL 12.5 MG/1
12.5 TABLET ORAL 2 TIMES DAILY WITH MEALS
Status: DISCONTINUED | OUTPATIENT
Start: 2021-12-01 | End: 2021-12-06 | Stop reason: HOSPADM

## 2021-12-01 RX ADMIN — CARVEDILOL 12.5 MG: 12.5 TABLET, FILM COATED ORAL at 16:52

## 2021-12-01 RX ADMIN — LEVOTHYROXINE SODIUM 150 MCG: 0.15 TABLET ORAL at 05:42

## 2021-12-01 RX ADMIN — Medication 10 ML: at 05:43

## 2021-12-01 RX ADMIN — Medication 10 ML: at 22:45

## 2021-12-01 RX ADMIN — Medication 10 ML: at 14:08

## 2021-12-01 RX ADMIN — CEFTRIAXONE 1 G: 1 INJECTION, POWDER, FOR SOLUTION INTRAMUSCULAR; INTRAVENOUS at 12:24

## 2021-12-01 RX ADMIN — CLONIDINE HYDROCHLORIDE 0.2 MG: 0.1 TABLET ORAL at 04:32

## 2021-12-01 RX ADMIN — AZITHROMYCIN MONOHYDRATE 500 MG: 250 TABLET ORAL at 12:24

## 2021-12-01 NOTE — PROGRESS NOTES
Problem: Falls - Risk of  Goal: *Absence of Falls  Description: Document Michael Sol Fall Risk and appropriate interventions in the flowsheet. Outcome: Progressing Towards Goal  Note: Fall Risk Interventions:  Mobility Interventions: Bed/chair exit alarm, Patient to call before getting OOB, Communicate number of staff needed for ambulation/transfer    Mentation Interventions: Bed/chair exit alarm, Door open when patient unattended, Reorient patient, More frequent rounding    Medication Interventions: Patient to call before getting OOB, Bed/chair exit alarm    Elimination Interventions: Bed/chair exit alarm, Call light in reach, Patient to call for help with toileting needs    History of Falls Interventions: Bed/chair exit alarm, Door open when patient unattended         Problem: Pressure Injury - Risk of  Goal: *Prevention of pressure injury  Description: Document Fredo Scale and appropriate interventions in the flowsheet.   Outcome: Progressing Towards Goal  Note: Pressure Injury Interventions:  Sensory Interventions: Assess changes in LOC, Minimize linen layers, Keep linens dry and wrinkle-free    Moisture Interventions: Absorbent underpads, Internal/External urinary devices    Activity Interventions: PT/OT evaluation, Pressure redistribution bed/mattress(bed type)    Mobility Interventions: Pressure redistribution bed/mattress (bed type), PT/OT evaluation    Nutrition Interventions: Document food/fluid/supplement intake                     Problem: Patient Education: Go to Patient Education Activity  Goal: Patient/Family Education  Outcome: Progressing Towards Goal

## 2021-12-01 NOTE — PROGRESS NOTES
Pharmacy called to verify the dosage on the patients Coreg. I am unable to verify dosage at the time. Will have the day team call the patient's soon for more information.

## 2021-12-01 NOTE — ACP (ADVANCE CARE PLANNING)
Advance Care Planning     General Advance Care Planning (ACP) Conversation      Date of Conversation: 11/30/2021  Conducted with: Patient with Decision Making Capacity    Healthcare Decision Maker:   No healthcare decision makers have been documented. Click here to complete [address] including selection of the Healthcare Decision Maker Relationship (ie \"Primary\")    Today we documented Decision Maker(s) consistent with Legal Next of Kin hierarchy.     Content/Action Overview:   Has NO ACP documents/care preferences - refer to ACP Clinical Specialist  Reviewed DNR/DNI and patient elects Full Code (Attempt Resuscitation)  Topics discussed: hospitalization preferences and resuscitation preferences  Additional Comments: N/A     Length of Voluntary ACP Conversation in minutes:  <16 minutes (Non-Billable)    Vipin Rasmussen LMSW

## 2021-12-01 NOTE — ED NOTES
TRANSFER - OUT REPORT:    Verbal report given to Lilo Shea on Kathryn Lindsey  being transferred to [phone] for routine progression of care       Report consisted of patients Situation, Background, Assessment and   Recommendations(SBAR). Information from the following report(s) SBAR, Kardex, ED Summary, [facility] and Recent Results was reviewed with the receiving nurse. Lines:   Peripheral IV 11/30/21 Right Antecubital (Active)   Site Assessment Clean, dry, & intact 11/30/21 1308   Phlebitis Assessment 0 11/30/21 1308   Infiltration Assessment 0 11/30/21 1308        Opportunity for questions and clarification was provided.       Patient transported with:   [facility]

## 2021-12-01 NOTE — PROGRESS NOTES
Pt meets criteria to receive Clonidine      12/01/21 0408   Vitals   BP (!) 175/101  (RN notified)   given (See Mar)

## 2021-12-01 NOTE — PROGRESS NOTES
The patient came to the floor oriented to person, she keeps asking where she is and says she doesn't understand what's going on. The patient is calm and pleasant and has a possible self care deficit, the patient is also a poor historian. The admission assessment was started with what information was given. The patient's son, who the patent said lives with her, will probably be the best source of information. His contact information is in the chart.

## 2021-12-01 NOTE — PROGRESS NOTES
Hospitalist Progress Note   Admit Date:  2021  1:06 PM   Name:  Kan Fountain   Age:  77 y.o. Sex:  female  :  1955   MRN:  [id number]   Room:  [facility]    Presenting Complaint: Blood infection    Reason(s) for Admission: CAP (community acquired pneumonia) [J18.9]     Hospital Course & Interval History:     Patient with past medical history of  Hypothyroidism  CABG  Atrial fibrillation  PUD  GI bleeding  Hypertension  Prediabetes  Breast cancer  Patient presented to hospital with generalized weakness with change in mental status, according to the son. Patient was found to have infiltration in the lung field. She is diagnosed as community-acquired bacterial pneumonia. She is started on empiric antibiotic. Subjective (21):    2021  No fever today. No chest pain and no shortness of breath. .   Breathing better. No hemoptysis. Tmax in the past 24 hours is 101.4 F    Assessment & Plan:     Principal Problem:    CAP (community acquired pneumonia) (2021)  Continue current empiric antibiotic  She is on ceftriaxone, and azithromycin  Symptomatic treatment  Oxygen supplementation as needed  Monitor closely    Active Problems:    Acquired hypothyroidism (10/5/2016)  Resume home medication, levothyroxine 150 mcg p.o. once a day      Mixed hyperlipidemia (10/5/2016)  Noted      Essential hypertension (2017)  On carvedilol  Monitor blood pressure    I have discussed the plan of care with patient. Dispo/Discharge Planning:    Likely can go home in 2-3 days.      Diet:  ADULT DIET Regular; 4 carb choices (60 gm/meal)  DVT PPx: SCD  Code status: Full Code    Hospital Problems as of 2021 Date Reviewed: 2/15/2021          Codes Class Noted - Resolved POA    * (Principal) CAP (community acquired pneumonia) ICD-10-CM: J18.9  ICD-9-CM: 980  2021 - Present Unknown        Essential hypertension ICD-10-CM: I10  ICD-9-CM: 401.9  2017 - Present Yes Acquired hypothyroidism ICD-10-CM: E03.9  ICD-9-CM: 244.9  10/5/2016 - Present Yes        Mixed hyperlipidemia ICD-10-CM: E78.2  ICD-9-CM: 272.2  10/5/2016 - Present Yes              Objective:     Patient Vitals for the past 24 hrs:   Temp Pulse Resp BP SpO2   12/01/21 1135 98.8 °F (37.1 °C) 72 18 122/63 99 %   12/01/21 0720 98.6 °F (37 °C) 92 20 127/72 100 %   12/01/21 0548 -- 76 -- (!) 144/82 --   12/01/21 0408 99.3 °F (37.4 °C) 92 20 (!) 175/101 95 %   11/30/21 2149 98.3 °F (36.8 °C) 70 20 (!) 163/93 100 %   11/30/21 2030 -- 63 13 (!) 158/72 98 %   11/30/21 2000 -- 72 15 (!) 155/97 99 %   11/30/21 1930 -- 65 14 (!) 153/80 97 %   11/30/21 1900 -- 65 10 (!) 150/76 98 %   11/30/21 1826 98.7 °F (37.1 °C) -- -- -- --   11/30/21 1800 -- 67 12 (!) 149/83 96 %   11/30/21 1730 -- 71 16 (!) 144/80 97 %   11/30/21 1710 -- 67 14 (!) 144/80 96 %   11/30/21 1600 -- 75 12 (!) 142/85 98 %   11/30/21 1530 -- 72 -- 136/75 96 %   11/30/21 1457 99.8 °F (37.7 °C) -- -- -- --   11/30/21 1456 -- -- -- -- 98 %   11/30/21 1455 -- 71 10 (!) 142/84 --     Oxygen Therapy  O2 Sat (%): 99 % (12/01/21 1135)  Pulse via Oximetry: 63 beats per minute (11/30/21 2030)  O2 Device: None (Room air) (12/01/21 1135)    Estimated body mass index is 24.45 kg/m² as calculated from the following:    Height as of this encounter: 5' 10\" (1.778 m). Weight as of this encounter: 77.3 kg (170 lb 6.4 oz). Intake/Output Summary (Last 24 hours) at 12/1/2021 1311  Last data filed at 11/30/2021 1559  Gross per 24 hour   Intake 300 ml   Output --   Net 300 ml         Physical Exam:     Blood pressure 122/63, pulse 72, temperature 98.8 °F (37.1 °C), resp. rate 18, height 5' 10\" (1.778 m), weight 77.3 kg (170 lb 6.4 oz), SpO2 99 %. General:    Well nourished. No overt distress, patient is lying flat with head up in bed  Head:  Normocephalic, atraumatic  Eyes:  Sclerae appear normal.  Pupils equally round. ENT:  Nares appear normal, no drainage.   Moist oral mucosa  Neck:  No restricted ROM. Trachea midline   CV:   RRR. No m/r/g. No jugular venous distension. Lungs:   CTAB. No wheezing, rhonchi, or rales. Respirations even, unlabored  Abdomen: Bowel sounds present. Soft, nontender, nondistended. Extremities: No cyanosis or clubbing. No edema  Skin:     No rashes and normal coloration. Warm and dry. Neuro:  CN II-XII grossly intact. Sensation intact. A&Ox3  Psych:  Normal mood and affect. I have reviewed ordered lab tests and independently visualized imaging below:    Recent Labs:  Recent Results (from the past 48 hour(s))   CULTURE, BLOOD    Collection Time: 11/30/21  1:13 PM    Specimen: Blood   Result Value Ref Range    Special Requests: RIGHT ANTECUBITAL      Culture result: NO GROWTH AFTER 16 HOURS     CBC WITH AUTOMATED DIFF    Collection Time: 11/30/21  1:24 PM   Result Value Ref Range    WBC 6.2 4.3 - 11.1 K/uL    RBC 2.75 (L) 4.05 - 5.2 M/uL    HGB 9.1 (L) 11.7 - 15.4 g/dL    HCT 27.9 (L) 35.8 - 46.3 %    .5 (H) 79.6 - 97.8 FL    MCH 33.1 (H) 26.1 - 32.9 PG    MCHC 32.6 31.4 - 35.0 g/dL    RDW 12.8 11.9 - 14.6 %    PLATELET 319 (L) 366 - 450 K/uL    MPV 9.6 9.4 - 12.3 FL    ABSOLUTE NRBC 0.00 0.0 - 0.2 K/uL    NEUTROPHILS 75 43 - 78 %    LYMPHOCYTES 18 13 - 44 %    MONOCYTES 7 4.0 - 12.0 %    EOSINOPHILS 0 (L) 0.5 - 7.8 %    BASOPHILS 0 0.0 - 2.0 %    IMMATURE GRANULOCYTES 0 0.0 - 5.0 %    ABS. NEUTROPHILS 4.7 1.7 - 8.2 K/UL    ABS. LYMPHOCYTES 1.1 0.5 - 4.6 K/UL    ABS. MONOCYTES 0.4 0.1 - 1.3 K/UL    ABS. EOSINOPHILS 0.0 0.0 - 0.8 K/UL    ABS. BASOPHILS 0.0 0.0 - 0.2 K/UL    ABS. IMM.  GRANS. 0.0 0.0 - 0.5 K/UL    DF AUTOMATED     METABOLIC PANEL, COMPREHENSIVE    Collection Time: 11/30/21  1:24 PM   Result Value Ref Range    Sodium 140 136 - 145 mmol/L    Potassium 3.1 (L) 3.5 - 5.1 mmol/L    Chloride 106 98 - 107 mmol/L    CO2 26 21 - 32 mmol/L    Anion gap 8 7 - 16 mmol/L    Glucose 121 (H) 65 - 100 mg/dL    BUN 15 8 - 23 MG/DL Creatinine 1.37 (H) 0.6 - 1.0 MG/DL    GFR est AA 49 (L) >60 ml/min/1.73m2    GFR est non-AA 41 (L) >60 ml/min/1.73m2    Calcium 8.6 8.3 - 10.4 MG/DL    Bilirubin, total 0.8 0.2 - 1.1 MG/DL    ALT (SGPT) 30 12 - 65 U/L    AST (SGOT) 34 15 - 37 U/L    Alk.  phosphatase 47 (L) 50 - 136 U/L    Protein, total 8.1 6.3 - 8.2 g/dL    Albumin 3.2 3.2 - 4.6 g/dL    Globulin 4.9 (H) 2.3 - 3.5 g/dL    A-G Ratio 0.7 (L) 1.2 - 3.5     PROCALCITONIN    Collection Time: 11/30/21  1:24 PM   Result Value Ref Range    Procalcitonin 0.21 0.00 - 0.49 ng/mL   MAGNESIUM    Collection Time: 11/30/21  1:24 PM   Result Value Ref Range    Magnesium 2.0 1.8 - 2.4 mg/dL   VITAMIN B12    Collection Time: 11/30/21  1:24 PM   Result Value Ref Range    Vitamin B12 520 193 - 986 pg/mL   FOLATE    Collection Time: 11/30/21  1:24 PM   Result Value Ref Range    Folate 15.6 3.1 - 17.5 ng/mL   TRANSFERRIN    Collection Time: 11/30/21  1:24 PM   Result Value Ref Range    Transferrin 180 (L) 202 - 364 mg/dL   TRANSFERRIN SATURATION    Collection Time: 11/30/21  1:24 PM   Result Value Ref Range    Iron 29 (L) 35 - 150 ug/dL    TIBC 273 250 - 450 ug/dL    Transferrin Saturation 11 (L) >20 %   FERRITIN    Collection Time: 11/30/21  1:24 PM   Result Value Ref Range    Ferritin 1,088 (H) 8 - 388 NG/ML   TSH 3RD GENERATION    Collection Time: 11/30/21  1:24 PM   Result Value Ref Range    TSH 69.700 (H) 0.358 - 3.740 uIU/mL   LACTIC ACID    Collection Time: 11/30/21  1:25 PM   Result Value Ref Range    Lactic acid 0.8 0.4 - 2.0 MMOL/L   CULTURE, URINE    Collection Time: 11/30/21  1:33 PM    Specimen: Cath Urine    URINE   Result Value Ref Range    Special Requests: NO SPECIAL REQUESTS      Culture result: NO GROWTH 1 DAY     COVID-19 RAPID TEST    Collection Time: 11/30/21  2:00 PM   Result Value Ref Range    Specimen source Nasopharyngeal      COVID-19 rapid test Not detected NOTD     CULTURE, BLOOD    Collection Time: 11/30/21  2:38 PM    Specimen: Blood Result Value Ref Range    Special Requests: LEFT  HAND        Culture result: NO GROWTH AFTER 15 HOURS         All Micro Results     Procedure Component Value Units Date/Time    CULTURE, URINE [[id number]] Collected: 11/30/21 1333    Order Status: Completed Specimen: Cath Urine Updated: 12/01/21 0935     Special Requests: NO SPECIAL REQUESTS        Culture result: NO GROWTH 1 DAY       BLOOD CULTURE [[id number]] Collected: 11/30/21 1313    Order Status: Completed Specimen: Blood Updated: 12/01/21 0635     Special Requests: RIGHT ANTECUBITAL        Culture result: NO GROWTH AFTER 16 HOURS       BLOOD CULTURE [[id number]] Collected: 11/30/21 1438    Order Status: Completed Specimen: Blood Updated: 12/01/21 0635     Special Requests: --        LEFT  HAND       Culture result: NO GROWTH AFTER 15 HOURS       COVID-19 RAPID TEST [[id number]] Collected: 11/30/21 1400    Order Status: Completed Specimen: Nasopharyngeal Updated: 11/30/21 1419     Specimen source Nasopharyngeal        COVID-19 rapid test Not detected        Comment:      The specimen is NEGATIVE for SARS-CoV-2, the novel coronavirus associated with COVID-19. A negative result does not rule out COVID-19. This test has been authorized by the FDA under an Emergency Use Authorization (EUA) for use by authorized laboratories. Fact sheet for Healthcare Providers: ConventionUpdate.co.nz  Fact sheet for Patients: ConventionUpdate.co.nz       Methodology: Isothermal Nucleic Acid Amplification               Other Studies:  CT HEAD WO CONT    Result Date: 11/30/2021  CT head without contrast History: pt c/o general weakness and AMS per son, baseline Aand O x 4, GLF yesterday.  Technique: 5mm axial images were obtained from the skull base to the vertex without contrast. Radiation dose reduction techniques were used for this study: Our CT scanners use one or all of the following: Automated exposure control, adjustment of the mA and/or kVp according to patient's size, iterative reconstruction. Comparison: None Findings: The ventricles and sulci are appropriate for age. There are no extra-axial fluid collections. No evidence of acute intraparenchymal hemorrhage or mass effect is identified. Patchy areas of decreased attenuation are noted within the supratentorial white matter. These are nonspecific findings but would be most compatible with mild chronic small vessel ischemic changes. There is no evidence to suggest an acute major territorial infarct. The bony calvarium is intact. The visualized mastoid air cells and paranasal sinuses are well pneumatized and aerated. 1. Findings most compatible with mild chronic small vessel ischemic changes. 2. Otherwise unremarkable unenhanced CT scan of the brain. XR CHEST PORT    Result Date: 11/30/2021  Portable chest: History: Pt family states generalized weakness and AMS Comparison: 02/09/2019 Findings: A single view of the chest was obtained at 1334 hours. Left subclavian Mediport catheter is unchanged. The cardiac silhouette is normal in size and configuration. There is mild patchy right basilar airspace opacity. There are no pleural effusions. The pulmonary vascularity is within normal limits. Sternotomy wires are present. Surgical clips overlie the right axilla. Mild right basilar airspace opacity worrisome for pneumonia in the proper clinical setting.        Current Meds:  Current Facility-Administered Medications   Medication Dose Route Frequency    carvediloL (COREG) tablet 12.5 mg  12.5 mg Oral BID WITH MEALS    sodium chloride (NS) flush 5-10 mL  5-10 mL IntraVENous Q8H    sodium chloride (NS) flush 5-10 mL  5-10 mL IntraVENous PRN    exemestane (AROMASIN) tablet 25 mg (Patient Supplied)  25 mg Oral DAILY    levothyroxine (SYNTHROID) tablet 150 mcg  150 mcg Oral ACB    sodium chloride (NS) flush 5-40 mL  5-40 mL IntraVENous Q8H    sodium chloride (NS) flush 5-40 mL 5-40 mL IntraVENous PRN    acetaminophen (TYLENOL) tablet 650 mg  650 mg Oral Q6H PRN    Or    acetaminophen (TYLENOL) suppository 650 mg  650 mg Rectal Q6H PRN    senna (SENOKOT) tablet 17.2 mg  2 Tablet Oral BID PRN    ondansetron (ZOFRAN) injection 4 mg  4 mg IntraVENous Q6H PRN    cloNIDine HCL (CATAPRES) tablet 0.2 mg  0.2 mg Oral BID PRN    hydrALAZINE (APRESOLINE) 20 mg/mL injection 10 mg  10 mg IntraVENous Q6H PRN    cefTRIAXone (ROCEPHIN) 1 g in 0.9% sodium chloride (MBP/ADV) 50 mL MBP  1 g IntraVENous Q24H    azithromycin (ZITHROMAX) tablet 500 mg  500 mg Oral DAILY    albuterol-ipratropium (DUO-NEB) 2.5 MG-0.5 MG/3 ML  3 mL Nebulization Q4H PRN    tuberculin injection 5 Units  5 Units IntraDERMal ONCE       Signed:  Miracle Pennington MD    Part of this note may have been written by using a voice dictation software. The note has been proof read but may still contain some grammatical/other typographical errors.

## 2021-12-01 NOTE — PROGRESS NOTES
ACUTE OT GOALS:  (Developed with and agreed upon by patient and/or caregiver.)  1. Patient will complete lower body dressing with stand by assist to increase self care independence. 2. Patient will complete toileting with stand by assist to increase self care independence. 3. Patient will improve static standing and dynamic standing at edge of bed for 10 minutes to improve independence with transfers and self cares. 4. Patient will complete all functional transfers with supervision using adaptive equipment as needed. 5. Patient will complete UE exercises with independence to increase overall activity tolerance and strength.     Timeframe: 7 visits      OCCUPATIONAL THERAPY ASSESSMENT: Initial Assessment and Daily Note OT Treatment Day # 1    Reji Rios is a 77 y.o. female   PRIMARY DIAGNOSIS: CAP (community acquired pneumonia)  CAP (community acquired pneumonia) [J18.9]       Reason for Referral:    ICD-10: Treatment Diagnosis: Generalized Muscle Weakness (M62.81)  Other lack of cordination (R27.8)  Difficulty in walking, Not elsewhere classified (R26.2)  INPATIENT: Payor: [employer] MEDICARE COMPLETE / Plan: [facility] MEDICARE COMPLETE / Product Type: Managed Care Medicare /   ASSESSMENT:     REHAB RECOMMENDATIONS:   Recommendation to date pending progress:  [address] [place] [address]  Equipment:    Rolling Walker   To Be Determined     PRIOR LEVEL OF FUNCTION:  (Prior to Hospitalization)  INITIAL/CURRENT LEVEL OF FUNCTION:  (Based on today's evaluation)   Bathing:   Independent  Dressing:   Independent  Feeding/Grooming:   Independent  Toileting:   Independent  Functional Mobility:   Independent Bathing:   Moderate Assistance  Dressing:   Minimal Assistance  Feeding/Grooming:  [employer] Standby Assistance  Toileting:   Minimal Assistance  Functional Mobility:   Contact Guard Assistance RW     ASSESSMENT:  Ms. [facility] presents with the above diagnosis and overall deficits in strength, household mobility and ADL performance. Pt supine in bed and son at bedside upon entering. She was agreeable to OT evaluation. Son provided most answers to PLOF questions. She lives with son in 1 level home with about 4 steps to enter. She has a walk-in shower and reports independence with all ADLs and mobilizes with no device. She is usually A&Ox4. Today, she is slow to process and respond during session but works through activities with verbal and tactile cues. She performed bed mobility, sit<>stand and bed>chair with CGA (and RW). She is functioning below her baseline and will benefit from continued skilled OT during hospital stay. SUBJECTIVE:   Ms. Lianet Pelayo states, \"He really takes the time and listens to me. \"    SOCIAL HISTORY/LIVING ENVIRONMENT: Lives with son in 1 level home. 2 steps and then 2 more shallow steps to enter. Walk-in shower with built in bench. Ind with ADLs and mobility.  No assistive device  Home Environment: Private residence  # Steps to Enter: 4 (2 and then 2 more)  One/Two Story Residence: One story  Living Alone: No  Support Systems: Child(annamaria) (Son)    OBJECTIVE:     PAIN: VITAL SIGNS: LINES/DRAINS:   Pre Treatment: Pain Screen  Pain Scale 1: Numeric (0 - 10)  Pain Intensity 1: 0  Post Treatment: 0   Purewick  O2 Device: None (Room air)     GROSS EVALUATION:  B UE Within Functional Limits Abnormal/ Functional Abnormal/ Non-Functional (see comments) Not Tested Comments:   AROM [x] [] [] []    PROM [x] [] [] []    Strength [] [x] [] []    Balance [] [x] [] []    Posture [] [x] [] []    Sensation [x] [] [] []    Coordination [] [] [x] [] Slow processing and coordination, especially in R UE   Tone [x] [] [] []    Edema [] [] [] [x]    Activity Tolerance [x] [] [] []     [] [] [] []      COGNITION/  PERCEPTION: Intact Impaired   (see comments) Comments:   Orientation [] [x] Oriented to person, not to place   Vision [x] [] Glasses intact, son reports a cataract    Hearing [x] []    Judgment/ Insight [] [x] Unsure of insight into deficits   Attention [] [x] Verbal cues to stay on task   Memory [] [] Unable to formally assess   Command Following [x] [] slow   Emotional Regulation [x] []     [] []      ACTIVITIES OF DAILY LIVING: I Mod I S SBA CGA Min Mod Max Total NT Comments   BASIC ADLs:              Bathing/ Showering [] [] [] [] [] [] [] [] [] [x]    Toileting [] [] [] [] [] [] [] [] [] [x]    Dressing [] [] [] [] [] [] [] [] [] [x]    Feeding [] [] [] [] [] [] [] [] [] [x]    Grooming [] [] [] [] [] [] [] [] [] [x]    Personal Device Care [] [] [] [] [] [] [] [] [] [x]    Functional Mobility [] [] [] [] [x] [] [] [] [] []    I=Independent, Mod I=Modified Independent, S=Supervision, SBA=Standby Assistance, CGA=Contact Guard Assistance,   Min=Minimal Assistance, Mod=Moderate Assistance, Max=Maximal Assistance, Total=Total Assistance, NT=Not Tested    MOBILITY: I Mod I S SBA CGA Min Mod Max Total  NT x2 Comments:   Supine to sit [] [] [] [] [x] [] [] [] [] [] []    Sit to supine [] [] [] [] [] [] [] [] [] [x] []    Sit to stand [] [] [] [] [x] [] [] [] [] [] []    Bed to chair [] [] [] [] [x] [] [] [] [] [] [] RW   I=Independent, Mod I=Modified Independent, S=Supervision, SBA=Standby Assistance, CGA=Contact Guard Assistance,   Min=Minimal Assistance, Mod=Moderate Assistance, Max=Maximal Assistance, Total=Total Assistance, NT=Not [facility] 6 Clicks   Daily Activity Inpatient Short Form        How much help from another person does the patient currently need. .. Total A Lot A Little None   1. Putting on and taking off regular lower body clothing? [] 1   [x] 2   [] 3   [] 4   2. Bathing (including washing, rinsing, drying)? [] 1   [x] 2   [] 3   [] 4   3. Toileting, which includes using toilet, bedpan or urinal?   [] 1   [x] 2   [] 3   [] 4   4. Putting on and taking off regular upper body clothing? [] 1   [] 2   [x] 3   [] 4   5.   Taking care of personal grooming such as brushing teeth?   [] 1   [] 2   [] 3   [x] 4   6. Eating meals? [] 1   [] 2   [] 3   [x] 4   © 2007, Trustees of [employer], under license to [employer]. All rights reserved     Score:  Initial: 17 Most Recent: X (Date: -- )   Interpretation of Tool:  Represents activities that are increasingly more difficult (i.e. Bed mobility, Transfers, Gait). PLAN:   FREQUENCY/DURATION: OT Plan of Care: 3 times/week for duration of hospital stay or until stated goals are met, whichever comes first.    PROBLEM LIST:   (Skilled intervention is medically necessary to address:)  1. Decreased ADL/Functional Activities  2. Decreased Activity Tolerance  3. Decreased Balance  4. Decreased Cognition  5. Decreased Coordination  6. Decreased Gait Ability  7. Decreased Strength  8. Decreased Transfer Abilities   INTERVENTIONS PLANNED:   (Benefits and precautions of occupational therapy have been discussed with the patient.)  1. Self Care Training  2. Therapeutic Activity  3. Therapeutic Exercise/HEP  4. Neuromuscular Re-education  5. Education     TREATMENT:     EVALUATION: Low Complexity : (Untimed Charge)    TREATMENT:   ( $$ Therapeutic Activity: 23-37 mins   )  Therapeutic Activity (25 Minutes): Therapeutic activity included Supine to Sit, Transfer Training, Ambulation on level ground and Standing balance to improve functional Mobility, Strength and Activity tolerance.     TREATMENT GRID:  N/A    AFTER TREATMENT POSITION/PRECAUTIONS:  Alarm Activated, Chair, Needs within reach, RN notified and Visitors at bedside    INTERDISCIPLINARY COLLABORATION:  RN/PCT, PT/PTA and OT/HINES    TOTAL TREATMENT DURATION:  OT Patient Time In/Time Out  Time In: 1545  Time Out: Dontae Coombs OT

## 2021-12-01 NOTE — PROGRESS NOTES
Chart screened by CM for d/c planning. Pt's son Pedro Mendoza [phone] is involved in pt's care. Pt has insurance with pharmacy benefits and a PCP. Pt presented to the ED with c/o generalized weakness and blood infection. Pt admitted with Dx of CAP, Acquired hypothyroidism, Mixed HLD, HTN, Prediabetes, A-Fib, CABG, and Hx of Breast CA. Per ED notes pt arrived A/O x1 and malodorous. Pt has been febrile the past 24 hours with Tmax of 101.4 F. PT and OT consults have been ordered. CM will await recommendations. A PPD has been ordered. D/C plan is undetermined at this time. CM attempted to call pt's son but was unable to reach him. CM will attempt again in the morning. CM will continue to follow and remain available if any needs arise. Care Management Interventions  PCP Verified by CM: Yes (Reta Camacho NP)  Mode of Transport at Discharge:  Other (see comment) (Family)  Transition of Care Consult (CM Consult): Discharge Planning  Physical Therapy Consult: Yes  Occupational Therapy Consult: Yes  Speech Therapy Consult: No  Support Systems: Child(annamaria)  Confirm Follow Up Transport: Family  The Patient and/or Patient Representative was Provided with a Choice of Provider and Agrees with the Discharge Plan?: Yes  Name of the Patient Representative Who was Provided with a Choice of Provider and Agrees with the Discharge Plan: Pedro Mendoza (son)  Freedom of Choice List was Provided with Basic Dialogue that Supports the Patient's Individualized Plan of Care/Goals, Treatment Preferences and Shares the Quality Data Associated with the Providers?: Yes   Resource Information Provided?: No  Discharge Location  Discharge Placement: Unable to determine at this time

## 2021-12-01 NOTE — PROGRESS NOTES
Problem: Falls - Risk of  Goal: *Absence of Falls  Description: Document Elvis Wiley Fall Risk and appropriate interventions in the flowsheet. Outcome: Progressing Towards Goal  Note: Fall Risk Interventions:  Mobility Interventions: Patient to call before getting OOB, Bed/chair exit alarm    Mentation Interventions: Bed/chair exit alarm    Medication Interventions: Patient to call before getting OOB, Bed/chair exit alarm    Elimination Interventions: Patient to call for help with toileting needs    History of Falls Interventions: Bed/chair exit alarm         Problem: Patient Education: Go to Patient Education Activity  Goal: Patient/Family Education  Outcome: Progressing Towards Goal     Problem: Pressure Injury - Risk of  Goal: *Prevention of pressure injury  Description: Document Fredo Scale and appropriate interventions in the flowsheet.   Outcome: Progressing Towards Goal  Note: Pressure Injury Interventions:  Sensory Interventions: Assess changes in LOC    Moisture Interventions: Absorbent underpads, Check for incontinence Q2 hours and as needed    Activity Interventions: PT/OT evaluation    Mobility Interventions: PT/OT evaluation    Nutrition Interventions: Document food/fluid/supplement intake    Friction and Shear Interventions: Apply protective barrier, creams and emollients                Problem: Patient Education: Go to Patient Education Activity  Goal: Patient/Family Education  Outcome: Progressing Towards Goal     Problem: Patient Education: Go to Patient Education Activity  Goal: Patient/Family Education  Outcome: Progressing Towards Goal

## 2021-12-01 NOTE — PROGRESS NOTES
TRANSFER - IN REPORT:    Verbal report received from GRAY Yusuf on Deb Murray  being received from ED for routine progression of care      Report consisted of patients Situation, Background, Assessment and   Recommendations(SBAR). Information from the following report(s) SBAR, Kardex, ED Summary, Procedure Summary and MAR was reviewed with the receiving nurse. Opportunity for questions and clarification was provided. Assessment completed upon patients arrival to unit and care assumed.

## 2021-12-01 NOTE — PROGRESS NOTES
11/30/21 2140   Dual Skin Pressure Injury Assessment   Dual Skin Pressure Injury Assessment WDL   Second Care Provider (Based on [address] [place] [address]) Carmen Woodward RN   Skin Integumentary   Skin Integumentary (WDL) X    Pressure  Injury Documentation No Pressure Injury Noted-Pressure Ulcer Prevention Initiated   Skin Color Appropriate for ethnicity   Skin Condition/Temp Warm; Dry; Flaky; Peeling   Skin Integrity Tear  (BL knees and legs scabs (healing))   Turgor Epidermis thin w/ loss of subcut tissue   Hair Growth Present   Nails X   Exceptions to WDL Thick;  Discolored  (R foot toenails dark)     *scar: right mastectomy

## 2021-12-02 LAB
BACTERIA SPEC CULT: NORMAL
MM INDURATION POC: 0 MM (ref 0–5)
PPD POC: NEGATIVE NEGATIVE
SERVICE CMNT-IMP: NORMAL

## 2021-12-02 PROCEDURE — 97530 THERAPEUTIC ACTIVITIES: CPT

## 2021-12-02 PROCEDURE — 65270000029 HC RM PRIVATE

## 2021-12-02 PROCEDURE — 74011000258 HC RX REV CODE- 258: Performed by: HOSPITALIST

## 2021-12-02 PROCEDURE — 97161 PT EVAL LOW COMPLEX 20 MIN: CPT

## 2021-12-02 PROCEDURE — 97535 SELF CARE MNGMENT TRAINING: CPT

## 2021-12-02 PROCEDURE — 74011250637 HC RX REV CODE- 250/637: Performed by: HOSPITALIST

## 2021-12-02 PROCEDURE — 74011250636 HC RX REV CODE- 250/636: Performed by: HOSPITALIST

## 2021-12-02 PROCEDURE — 74011250637 HC RX REV CODE- 250/637: Performed by: INTERNAL MEDICINE

## 2021-12-02 RX ADMIN — CARVEDILOL 12.5 MG: 12.5 TABLET, FILM COATED ORAL at 16:42

## 2021-12-02 RX ADMIN — Medication 10 ML: at 06:25

## 2021-12-02 RX ADMIN — Medication 10 ML: at 22:00

## 2021-12-02 RX ADMIN — Medication 10 ML: at 16:42

## 2021-12-02 RX ADMIN — CARVEDILOL 12.5 MG: 12.5 TABLET, FILM COATED ORAL at 08:25

## 2021-12-02 RX ADMIN — ACETAMINOPHEN 650 MG: 325 TABLET ORAL at 23:55

## 2021-12-02 RX ADMIN — CEFTRIAXONE 1 G: 1 INJECTION, POWDER, FOR SOLUTION INTRAMUSCULAR; INTRAVENOUS at 13:09

## 2021-12-02 RX ADMIN — AZITHROMYCIN MONOHYDRATE 500 MG: 250 TABLET ORAL at 08:25

## 2021-12-02 RX ADMIN — LEVOTHYROXINE SODIUM 150 MCG: 0.15 TABLET ORAL at 06:25

## 2021-12-02 NOTE — PROGRESS NOTES
Physician Progress Note      PATIENTAlgis Boxer  CSN #:                  [id number]  :                       1955  ADMIT DATE:       2021 1:06 PM  DISCH DATE:  RESPONDING  PROVIDER #:        Mario Adame MD          QUERY TEXT:    Pt admitted with weakness and AMS  Pt noted to have SIRS criteria with infection. If possible, please document in the progress notes and discharge summary if you are evaluating and /or treating any of the following: The medical record reflects the following:  Risk Factors: PNA, Hx of breast cancer. Clinical Indicators: cxray-- opacity worrisome for PNA, temp --101.4, cr--1.37. procalcitonin-.21, d-dimer 5.31. metabolic encephalopathy per admitting MD.  Treatment: iv ceftriaxone, azithromycin, 02 prn, labs, fluids. cxray  Options provided:  -- Sepsis, present on admission  -- Sepsis, present on admission now resolved  -- Sepsis, not present on admission  -- PNA without Sepsis  -- Other - I will add my own diagnosis  -- Disagree - Not applicable / Not valid  -- Disagree - Clinically unable to determine / Unknown  -- Refer to Clinical Documentation Reviewer    PROVIDER RESPONSE TEXT:    This patient has sepsis which was present on admission.     Query created by: Zack Quijano on 2021 1:00 PM      Electronically signed by:  Ana Ybarra MD 2021 1:56 PM

## 2021-12-02 NOTE — PROGRESS NOTES
Pt more alert today. CM met with pt at the bedside to discuss d/c planning. [place] PPE and social distancing were observed. Pt resides with her son Christian Zuniga [phone] in a one-story house. Pt does not have any home DME. Per chart screen pt was once referred to [facility] but pt's insurance was not accepted. Prior to hospitalization pt was fairly independent with ADLs. Her son provides transportation. PT and OT have evaluated pt and are recommending HH and a rolling walker at d/c. Pt is currently receiving IV ABT. Pt is on RA. Current d/c plan is for pt to return home, with [facility] and a rolling walker if agreeable, once IV ABT is completed. Per Dr. Tiarra Samuel pt may be medically stable for d/c within 48 hours. CM will continue to follow and remain available if any needs arise.

## 2021-12-02 NOTE — PROGRESS NOTES
ACUTE OT GOALS:  (Developed with and agreed upon by patient and/or caregiver.)  1. Patient will complete lower body dressing with stand by assist to increase self care independence. 2. Patient will complete toileting with stand by assist to increase self care independence. 3. Patient will improve static standing and dynamic standing at edge of bed for 10 minutes to improve independence with transfers and self cares. 4. Patient will complete all functional transfers with supervision using adaptive equipment as needed. 5. Patient will complete UE exercises with independence to increase overall activity tolerance and strength.     Timeframe: 7 visits      OCCUPATIONAL THERAPY: Daily Note OT Treatment Day # 2    Alexus Galloway is a 77 y.o. female   PRIMARY DIAGNOSIS: CAP (community acquired pneumonia)  CAP (community acquired pneumonia) [J18.9]       Payor: [employer] MEDICARE COMPLETE / Plan: Phyllis Obregon / Product Type: Managed Care Medicare /   ASSESSMENT:     REHAB RECOMMENDATIONS: CURRENT LEVEL OF FUNCTION:  (Most Recently Demonstrated)   Recommendation to date pending progress:  [facility] [place] [address] Therapy  Equipment:    Rolling Walker Bathing:   Not tested  Dressing:   Not tested  Feeding/Grooming:  [employer] Assistance  Toileting:   Not tested  Functional Mobility:   Contact Guard Assistance     ASSESSMENT:  Ms. [place] presents sleeping in the chair. She is agreeable to OT session. She mobilized around room with RW and CGA then to bathroom where she stood at the sink to wash hands and brush teeth. She required verbal cues and SBA for grooming tasks. She performed functional household mobility around room and hallway before returning to her chair with all needs met and in reach and alarm in place. Continue per OT POC. SUBJECTIVE:   Ms. [place] states, \"I'll go ahead and walk out there. \"    SOCIAL HISTORY/LIVING ENVIRONMENT: Lives with son in 1 level home.  2 steps and then 2 more shallow steps to enter. Walk-in shower with built in bench. Ind with ADLs and mobility.  No assistive device  Home Environment: Private residence  # Steps to Enter: 4 (2 and then 2 more)  One/Two Story Residence: One story  Living Alone: No  Support Systems: Child(annamaria) (Son)    OBJECTIVE:     PAIN: VITAL SIGNS: LINES/DRAINS:   Pre Treatment: Pain Screen  Pain Scale 1: Numeric (0 - 10)  Pain Intensity 1: 0  Post Treatment: 0   Purewick  O2 Device: None (Room air)     ACTIVITIES OF DAILY LIVING: I Mod I S SBA CGA Min Mod Max Total NT Comments   BASIC ADLs:              Bathing/ Showering [] [] [] [] [] [] [] [] [] [x]    Toileting [] [] [] [] [] [] [] [] [] [x]    Dressing [] [] [] [] [] [] [] [] [] [x]    Feeding [] [] [] [] [] [] [] [] [] [x]    Grooming [] [] [] [] [x] [] [] [] [] [] Verbal cues and steadying for safety while standing at sink   Personal Device Care [] [] [] [] [] [] [] [] [] [x]    Functional Mobility [] [] [] [] [x] [] [] [] [] [] RW   I=Independent, Mod I=Modified Independent, S=Supervision, SBA=Standby Assistance, CGA=Contact Guard Assistance,   Min=Minimal Assistance, Mod=Moderate Assistance, Max=Maximal Assistance, Total=Total Assistance, NT=Not Tested    MOBILITY: I Mod I S SBA CGA Min Mod Max Total  NT x2 Comments:   Supine to sit [] [] [] [] [] [] [] [] [] [x] []    Sit to supine [] [] [] [] [] [] [] [] [] [x] []    Sit to stand [] [] [] [] [x] [] [] [] [] [] []    Bed to chair [] [] [] [] [] [] [] [] [] [x] []    I=Independent, Mod I=Modified Independent, S=Supervision, SBA=Standby Assistance, CGA=Contact Guard Assistance,   Min=Minimal Assistance, Mod=Moderate Assistance, Max=Maximal Assistance, Total=Total Assistance, NT=Not Tested    BALANCE: Good Fair+ Fair Fair- Poor NT Comments   Sitting Static [x] [] [] [] [] []    Sitting Dynamic [x] [] [] [] [] []              Standing Static [] [x] [] [] [] []    Standing Dynamic [] [x] [] [] [] []      PLAN:   FREQUENCY/DURATION: OT Plan of Care: 3 times/week for duration of hospital stay or until stated goals are met, whichever comes first.    TREATMENT:   TREATMENT:   ($$ Self Care/Home Management: 8-22 mins$$ Therapeutic Activity: 8-22 mins   )  Therapeutic Activity (15 Minutes): Therapeutic activity included Transfer Training, Ambulation on level ground and Standing balance to improve functional Mobility, Strength and Activity tolerance. Self Care (15 Minutes): Self care including Grooming and Energy Conservation Training to increase independence and decrease level of assistance required.     TREATMENT GRID:  N/A    AFTER TREATMENT POSITION/PRECAUTIONS:  Alarm Activated, Chair, Needs within reach and RN notified    INTERDISCIPLINARY COLLABORATION:  RN/PCT and PT/PTA    TOTAL TREATMENT DURATION:  OT Patient Time In/Time Out  Time In: 1533  Time Out: [place] [address], OT

## 2021-12-02 NOTE — PROGRESS NOTES
ACUTE PHYSICAL THERAPY GOALS:  (Developed with and agreed upon by patient and/or caregiver. )  LT. Pt will be able to perform bed mobility and transfers with independence in order to be able to safely function within their home within 7 treatment days. 2. Pt will be able to ambulate a minimum of 75 ft with modified independence and use of least restrictive device in order to return to home and community ambulation within 7 treatment days. 3. Pt will be able to ambulate up/down 4 step with supervision in order to access his/her home safely within 7 treatment days. 4. Pt will demonstrate standing and sitting balance with indepedence in order to safely perform functional mobility and ADLS within 7 treatment days.        PHYSICAL THERAPY ASSESSMENT: Initial Assessment and Daily Note PT Treatment Day # 1      Kathryn Lindsey is a 77 y.o. female   PRIMARY DIAGNOSIS: CAP (community acquired pneumonia)  CAP (community acquired pneumonia) [J18.9]       Reason for Referral:    ICD-10: Treatment Diagnosis: Generalized Muscle Weakness (M62.81)  Difficulty in walking, Not elsewhere classified (R26.2)  Other abnormalities of gait and mobility (R26.89)  INPATIENT: Payor: [employer] MEDICARE COMPLETE / Plan: [facility] MEDICARE COMPLETE / Product Type: Managed Care Medicare /     ASSESSMENT:     REHAB RECOMMENDATIONS:   Recommendation to date pending progress:  [facility] Therapy vs no needs  Equipment:    To Be Determined     PRIOR LEVEL OF FUNCTION:  (Prior to Hospitalization) INITIAL/CURRENT LEVEL OF FUNCTION:  (Most Recently Demonstrated)   Bed Mobility:   Independent  Sit to Stand:   Independent  Transfers:   Independent  Gait/Mobility:   Independent Bed Mobility:   Contact Guard Assistance  Sit to Stand:   Contact Guard Assistance  Transfers:   Contact Guard Assistance  Gait/Mobility:   Contact Guard Assistance     ASSESSMENT:  Ms. Hans Chandler is a 77year old female admitted with CAP and presents with some confusion. Her baseline is gathered from the patient as well as other notes and appears to be independent. Today she is oriented to person and place and needs CGA for mobility. She is able to ambulate 5' to the bedside chair with SBA-CGA and doesn't demonstrate any significant LOB or instability. Pt is presenting below her functional baseline at this time and would continue to benefit from skilled PT.      SUBJECTIVE:   Ms. Maria R Johnson states, \"I am just so cold. \"    SOCIAL HISTORY/LIVING ENVIRONMENT: Per pt she lives with her son in a single story home, doesn't drive, hasn't had any falls and doesn't use any AD for mobility.   Home Environment: Private residence  # Steps to Enter: 4 (2 and then 2 more)  One/Two Story Residence: One story  Living Alone: No  Support Systems: Child(annamaria) (Son)  OBJECTIVE:     PAIN: VITAL SIGNS: LINES/DRAINS:   Pre Treatment: Pain Screen  Pain Scale 1: Numeric (0 - 10)  Pain Intensity 1: 0  Post Treatment: 0   Purewick  O2 Device: None (Room air)     GROSS EVALUATION:   Within Functional Limits Abnormal/ Functional Abnormal/ Non-Functional (see comments) Not Tested Comments:   AROM [x] [] [] []    PROM [] [] [] []    Strength [] [x] [] []    Balance [] [x] [] []    Posture [] [] [] []    Sensation [x] [] [] []    Coordination [] [] [] []    Tone [] [] [] []    Edema [] [] [] []    Activity Tolerance [] [x] [] []     [] [] [] []      COGNITION/  PERCEPTION: Intact Impaired   (see comments) Comments:   Orientation [] [x] Oriented to person and place   Vision [] [x] Wears glasses   Hearing [x] []    Command Following [x] []    Safety Awareness [] [x]     [] []      MOBILITY: I Mod I S SBA CGA Min Mod Max Total  NT x2 Comments:   Bed Mobility    Rolling [] [] [x] [] [] [] [] [] [] [] []    Supine to Sit [] [] [x] [] [] [] [] [] [] [] []    Scooting [] [] [] [] [] [] [] [] [] [x] []    Sit to Supine [] [] [] [] [] [] [] [] [] [x] []    Transfers    Sit to Stand [] [] [] [x] [x] [] [] [] [] [] []    Bed to Chair [] [] [] [x] [x] [] [] [] [] [] []    Stand to Sit [] [] [] [x] [] [] [] [] [] [] []    I=Independent, Mod I=Modified Independent, S=Supervision, SBA=Standby Assistance, CGA=Contact Guard Assistance,   Min=Minimal Assistance, Mod=Moderate Assistance, Max=Maximal Assistance, Total=Total Assistance, NT=Not Tested  GAIT: I Mod I S SBA CGA Min Mod Max Total  NT x2 Comments:   Level of Assistance [] [] [] [x] [x] [] [] [] [] [] []    Distance 5'    DME None    Gait Quality Forward flexion, decreased overall stability    Weightbearing Status N/A     I=Independent, Mod I=Modified Independent, S=Supervision, SBA=Standby Assistance, CGA=Contact Guard Assistance,   Min=Minimal Assistance, Mod=Moderate Assistance, Max=Maximal Assistance, Total=Total Assistance, NT=Not Tested    [address] [place] [address] AM-[facility]       How much difficulty does the patient currently have. .. Unable A Lot A Little None   1. Turning over in bed (including adjusting bedclothes, sheets and blankets)? [] 1   [] 2   [] 3   [x] 4   2. Sitting down on and standing up from a chair with arms ( e.g., wheelchair, bedside commode, etc.)   [] 1   [] 2   [x] 3   [] 4   3. Moving from lying on back to sitting on the side of the bed? [] 1   [] 2   [] 3   [x] 4   How much help from another person does the patient currently need. .. Total A Lot A Little None   4. Moving to and from a bed to a chair (including a wheelchair)? [] 1   [] 2   [x] 3   [] 4   5. Need to walk in hospital room? [] 1   [] 2   [x] 3   [] 4   6. Climbing 3-5 steps with a railing? [] 1   [x] 2   [] 3   [] 4   © 2007, Trustees of [address] [place] [address], under license to [employer]. All rights reserved     Score:  Initial: 19 Most Recent: X (Date: -- )    Interpretation of Tool:  Represents activities that are increasingly more difficult (i.e. Bed mobility, Transfers, Gait).     PLAN:   FREQUENCY/DURATION: PT Plan of Care: 3 times/week for duration of hospital stay or until stated goals are met, whichever comes first.    PROBLEM LIST:   (Skilled intervention is medically necessary to address:)  1. Decreased ADL/Functional Activities  2. Decreased Activity Tolerance  3. Decreased AROM/PROM  4. Decreased Balance  5. Decreased Cognition  6. Decreased Gait Ability  7. Decreased Strength  8. Decreased Transfer Abilities   INTERVENTIONS PLANNED:   (Benefits and precautions of physical therapy have been discussed with the patient.)  1. Therapeutic Activity  2. Therapeutic Exercise/HEP  3. Neuromuscular Re-education  4. Gait Training  5. Manual Therapy  6. Education     TREATMENT:     EVALUATION: Low Complexity : (Untimed Charge)    TREATMENT:   ($$ Therapeutic Activity: 8-22 mins    )  Therapeutic Activity (8 Minutes): Therapeutic activity included Supine to Sit, Transfer Training, Ambulation on level ground, Sitting balance  and Standing balance to improve functional Mobility, Strength and Activity tolerance.     TREATMENT GRID:  N/A    AFTER TREATMENT POSITION/PRECAUTIONS:  Alarm Activated, Chair and Needs within reach    INTERDISCIPLINARY COLLABORATION:  RN/PCT and PT/PTA    TOTAL TREATMENT DURATION:  PT Patient Time In/Time Out  Time In: 0946  Time Out: [place], [place]

## 2021-12-02 NOTE — PROGRESS NOTES
's visit attempted. Patient did not respond to several knocks on her door. Chaplains remain available for support.      Katy Cabello MDiv, BS  Board Certified

## 2021-12-02 NOTE — PROGRESS NOTES
Problem: Falls - Risk of  Goal: *Absence of Falls  Description: Document [place] Fall Risk and appropriate interventions in the flowsheet. Outcome: Progressing Towards Goal  Note: Fall Risk Interventions:  Mobility Interventions: Patient to call before getting OOB    Mentation Interventions: Adequate sleep, hydration, pain control    Medication Interventions: Patient to call before getting OOB    Elimination Interventions: Patient to call for help with toileting needs    History of Falls Interventions: Bed/chair exit alarm         Problem: Patient Education: Go to Patient Education Activity  Goal: Patient/Family Education  Outcome: Progressing Towards Goal     Problem: Pressure Injury - Risk of  Goal: *Prevention of pressure injury  Description: Document Fredo Scale and appropriate interventions in the flowsheet.   Outcome: Progressing Towards Goal  Note: Pressure Injury Interventions:  Sensory Interventions: Assess changes in LOC    Moisture Interventions: Absorbent underpads    Activity Interventions: PT/OT evaluation    Mobility Interventions: PT/OT evaluation    Nutrition Interventions: Document food/fluid/supplement intake    Friction and Shear Interventions: Apply protective barrier, creams and emollients                Problem: Patient Education: Go to Patient Education Activity  Goal: Patient/Family Education  Outcome: Progressing Towards Goal     Problem: Patient Education: Go to Patient Education Activity  Goal: Patient/Family Education  Outcome: Progressing Towards Goal     Problem: Patient Education: Go to Patient Education Activity  Goal: Patient/Family Education  Outcome: Progressing Towards Goal

## 2021-12-02 NOTE — PROGRESS NOTES
Hospitalist Progress Note   Admit Date:  2021  1:06 PM   Name:  Daniel Snowden   Age:  77 y.o. Sex:  female  :  1955   MRN:  [id number]   Room:  [facility]    Presenting Complaint: Blood infection    Reason(s) for Admission: CAP (community acquired pneumonia) [J18.9]     Hospital Course & Interval History:     Patient with past medical history of  Hypothyroidism  CABG  Atrial fibrillation  PUD  GI bleeding  Hypertension  Prediabetes  Breast cancer  Patient presented to hospital with generalized weakness with change in mental status, according to the son. Patient was found to have infiltration in the lung field. She is diagnosed as community-acquired bacterial pneumonia. She is started on empiric antibiotic. Subjective (21):    2021  No fever today. No chest pain and no shortness of breath. .   Breathing better. No hemoptysis. Tmax in the past 24 hours is 101.4 F    2021  No fever in the past 24 hours. Patient is eating better. No shortness of breath. No chest pain. Assessment & Plan:     Principal Problem:    CAP (community acquired pneumonia) (2021)  Continue current empiric antibiotic  She is on ceftriaxone, and azithromycin  Symptomatic treatment  Oxygen supplementation as needed  Monitor closely  We will have physical therapy work with her today. See how she is doing with ambulation. Currently she is on room air. Active Problems:    Acquired hypothyroidism (10/5/2016)  levothyroxine 150 mcg p.o. once a day      Mixed hyperlipidemia (10/5/2016)  Noted      Essential hypertension (2017)  On carvedilol  Monitor blood pressure  Blood pressure today is 141/77 mmHg    I have discussed the plan of care with patient.           Dispo/Discharge Planning:    Likely can go home in 1-2 days      Diet:  ADULT DIET Regular; 4 carb choices (60 gm/meal)  DVT PPx: SCD  Code status: Full Code    Hospital Problems as of 2021 Date Reviewed: 2/15/2021          Codes Class Noted - Resolved POA    * (Principal) CAP (community acquired pneumonia) ICD-10-CM: J18.9  ICD-9-CM: 002  11/30/2021 - Present Unknown        Essential hypertension ICD-10-CM: I10  ICD-9-CM: 401.9  1/4/2017 - Present Yes        Acquired hypothyroidism ICD-10-CM: E03.9  ICD-9-CM: 244.9  10/5/2016 - Present Yes        Mixed hyperlipidemia ICD-10-CM: E78.2  ICD-9-CM: 272.2  10/5/2016 - Present Yes              Objective:     Patient Vitals for the past 24 hrs:   Temp Pulse Resp BP SpO2   12/02/21 0742 98.6 °F (37 °C) 67 16 (!) 141/77 100 %   12/02/21 0300 99.5 °F (37.5 °C) 73 16 (!) 157/89 98 %   12/01/21 2220 98.5 °F (36.9 °C) 73 18 (!) 140/80 98 %   12/01/21 1919 99.7 °F (37.6 °C) 80 20 120/67 97 %   12/01/21 1652 99.7 °F (37.6 °C) 77 18 126/78 100 %   12/01/21 1135 98.8 °F (37.1 °C) 72 18 122/63 99 %     Oxygen Therapy  O2 Sat (%): 100 % (12/02/21 0742)  Pulse via Oximetry: 63 beats per minute (11/30/21 2030)  O2 Device: None (Room air) (12/02/21 0825)    Estimated body mass index is 24.45 kg/m² as calculated from the following:    Height as of this encounter: 5' 10\" (1.778 m). Weight as of this encounter: 77.3 kg (170 lb 6.4 oz). Intake/Output Summary (Last 24 hours) at 12/2/2021 0921  Last data filed at 12/1/2021 2220  Gross per 24 hour   Intake --   Output 575 ml   Net -575 ml         Physical Exam:     Blood pressure (!) 141/77, pulse 67, temperature 98.6 °F (37 °C), resp. rate 16, height 5' 10\" (1.778 m), weight 77.3 kg (170 lb 6.4 oz), SpO2 100 %. General:    Well nourished. No overt distress, patient is sitting up in bed eating breakfast .   head:  Normocephalic, atraumatic  Eyes:  Sclerae appear normal.  Pupils equally round. ENT:  Nares appear normal, no drainage. Moist oral mucosa  Neck:  No restricted ROM. Trachea midline   CV:   RRR. No m/r/g. No jugular venous distension. Lungs:   CTAB. No wheezing, rhonchi, or rales.   Respirations even, unlabored  Abdomen: Bowel sounds present. Soft, nontender, nondistended. Extremities: No cyanosis or clubbing. No edema  Skin:     No rashes and normal coloration. Warm and dry. Neuro:  CN II-XII grossly intact. Sensation intact. A&Ox3  Psych:  Normal mood and affect. I have reviewed ordered lab tests and independently visualized imaging below:    Recent Labs:  Recent Results (from the past 48 hour(s))   CULTURE, BLOOD    Collection Time: 11/30/21  1:13 PM    Specimen: Blood   Result Value Ref Range    Special Requests: RIGHT ANTECUBITAL      Culture result: NO GROWTH 2 DAYS     CBC WITH AUTOMATED DIFF    Collection Time: 11/30/21  1:24 PM   Result Value Ref Range    WBC 6.2 4.3 - 11.1 K/uL    RBC 2.75 (L) 4.05 - 5.2 M/uL    HGB 9.1 (L) 11.7 - 15.4 g/dL    HCT 27.9 (L) 35.8 - 46.3 %    .5 (H) 79.6 - 97.8 FL    MCH 33.1 (H) 26.1 - 32.9 PG    MCHC 32.6 31.4 - 35.0 g/dL    RDW 12.8 11.9 - 14.6 %    PLATELET 013 (L) 543 - 450 K/uL    MPV 9.6 9.4 - 12.3 FL    ABSOLUTE NRBC 0.00 0.0 - 0.2 K/uL    NEUTROPHILS 75 43 - 78 %    LYMPHOCYTES 18 13 - 44 %    MONOCYTES 7 4.0 - 12.0 %    EOSINOPHILS 0 (L) 0.5 - 7.8 %    BASOPHILS 0 0.0 - 2.0 %    IMMATURE GRANULOCYTES 0 0.0 - 5.0 %    ABS. NEUTROPHILS 4.7 1.7 - 8.2 K/UL    ABS. LYMPHOCYTES 1.1 0.5 - 4.6 K/UL    ABS. MONOCYTES 0.4 0.1 - 1.3 K/UL    ABS. EOSINOPHILS 0.0 0.0 - 0.8 K/UL    ABS. BASOPHILS 0.0 0.0 - 0.2 K/UL    ABS. IMM.  GRANS. 0.0 0.0 - 0.5 K/UL    DF AUTOMATED     METABOLIC PANEL, COMPREHENSIVE    Collection Time: 11/30/21  1:24 PM   Result Value Ref Range    Sodium 140 136 - 145 mmol/L    Potassium 3.1 (L) 3.5 - 5.1 mmol/L    Chloride 106 98 - 107 mmol/L    CO2 26 21 - 32 mmol/L    Anion gap 8 7 - 16 mmol/L    Glucose 121 (H) 65 - 100 mg/dL    BUN 15 8 - 23 MG/DL    Creatinine 1.37 (H) 0.6 - 1.0 MG/DL    GFR est AA 49 (L) >60 ml/min/1.73m2    GFR est non-AA 41 (L) >60 ml/min/1.73m2    Calcium 8.6 8.3 - 10.4 MG/DL    Bilirubin, total 0.8 0.2 - 1.1 MG/DL    ALT (SGPT) 30 12 - 65 U/L    AST (SGOT) 34 15 - 37 U/L    Alk.  phosphatase 47 (L) 50 - 136 U/L    Protein, total 8.1 6.3 - 8.2 g/dL    Albumin 3.2 3.2 - 4.6 g/dL    Globulin 4.9 (H) 2.3 - 3.5 g/dL    A-G Ratio 0.7 (L) 1.2 - 3.5     PROCALCITONIN    Collection Time: 11/30/21  1:24 PM   Result Value Ref Range    Procalcitonin 0.21 0.00 - 0.49 ng/mL   MAGNESIUM    Collection Time: 11/30/21  1:24 PM   Result Value Ref Range    Magnesium 2.0 1.8 - 2.4 mg/dL   VITAMIN B12    Collection Time: 11/30/21  1:24 PM   Result Value Ref Range    Vitamin B12 520 193 - 986 pg/mL   FOLATE    Collection Time: 11/30/21  1:24 PM   Result Value Ref Range    Folate 15.6 3.1 - 17.5 ng/mL   TRANSFERRIN    Collection Time: 11/30/21  1:24 PM   Result Value Ref Range    Transferrin 180 (L) 202 - 364 mg/dL   TRANSFERRIN SATURATION    Collection Time: 11/30/21  1:24 PM   Result Value Ref Range    Iron 29 (L) 35 - 150 ug/dL    TIBC 273 250 - 450 ug/dL    Transferrin Saturation 11 (L) >20 %   FERRITIN    Collection Time: 11/30/21  1:24 PM   Result Value Ref Range    Ferritin 1,088 (H) 8 - 388 NG/ML   TSH 3RD GENERATION    Collection Time: 11/30/21  1:24 PM   Result Value Ref Range    TSH 69.700 (H) 0.358 - 3.740 uIU/mL   LACTIC ACID    Collection Time: 11/30/21  1:25 PM   Result Value Ref Range    Lactic acid 0.8 0.4 - 2.0 MMOL/L   CULTURE, URINE    Collection Time: 11/30/21  1:33 PM    Specimen: Cath Urine    URINE   Result Value Ref Range    Special Requests: NO SPECIAL REQUESTS      Culture result: NO GROWTH 2 DAYS     COVID-19 RAPID TEST    Collection Time: 11/30/21  2:00 PM   Result Value Ref Range    Specimen source Nasopharyngeal      COVID-19 rapid test Not detected NOTD     CULTURE, BLOOD    Collection Time: 11/30/21  2:38 PM    Specimen: Blood   Result Value Ref Range    Special Requests: LEFT  HAND        Culture result: NO GROWTH 2 DAYS     METABOLIC PANEL, BASIC    Collection Time: 12/01/21 12:33 PM   Result Value Ref Range    Sodium 140 136 - 145 mmol/L    Potassium 3.5 3.5 - 5.1 mmol/L    Chloride 107 98 - 107 mmol/L    CO2 25 21 - 32 mmol/L    Anion gap 8 7 - 16 mmol/L    Glucose 94 65 - 100 mg/dL    BUN 15 8 - 23 MG/DL    Creatinine 1.17 (H) 0.6 - 1.0 MG/DL    GFR est AA 59 (L) >60 ml/min/1.73m2    GFR est non-AA 49 (L) >60 ml/min/1.73m2    Calcium 8.3 8.3 - 10.4 MG/DL   CBC W/O DIFF    Collection Time: 12/01/21 12:33 PM   Result Value Ref Range    WBC 5.6 4.3 - 11.1 K/uL    RBC 2.64 (L) 4.05 - 5.2 M/uL    HGB 8.9 (L) 11.7 - 15.4 g/dL    HCT 26.6 (L) 35.8 - 46.3 %    .8 (H) 79.6 - 97.8 FL    MCH 33.7 (H) 26.1 - 32.9 PG    MCHC 33.5 31.4 - 35.0 g/dL    RDW 12.7 11.9 - 14.6 %    PLATELET 504 (L) 150 - 450 K/uL    MPV 10.1 9.4 - 12.3 FL    ABSOLUTE NRBC 0.00 0.0 - 0.2 K/uL   PLEASE READ & DOCUMENT PPD TEST IN 24 HRS    Collection Time: 12/01/21 11:45 PM   Result Value Ref Range    PPD Negative Negative    mm Induration 0 0 - 5 mm       All Micro Results     Procedure Component Value Units Date/Time    CULTURE, URINE [[id number]] Collected: 11/30/21 1333    Order Status: Completed Specimen: Cath Urine Updated: 12/02/21 0831     Special Requests: NO SPECIAL REQUESTS        Culture result: NO GROWTH 2 DAYS       BLOOD CULTURE [[id number]] Collected: 11/30/21 1438    Order Status: Completed Specimen: Blood Updated: 12/02/21 0610     Special Requests: --        LEFT  HAND       Culture result: NO GROWTH 2 DAYS       BLOOD CULTURE [[id number]] Collected: 11/30/21 1313    Order Status: Completed Specimen: Blood Updated: 12/02/21 0610     Special Requests: RIGHT ANTECUBITAL        Culture result: NO GROWTH 2 DAYS       COVID-19 RAPID TEST [[id number]] Collected: 11/30/21 1400    Order Status: Completed Specimen: Nasopharyngeal Updated: 11/30/21 1419     Specimen source Nasopharyngeal        COVID-19 rapid test Not detected        Comment:      The specimen is NEGATIVE for SARS-CoV-2, the novel coronavirus associated with COVID-19.   A negative result does not rule out COVID-19. This test has been authorized by the FDA under an Emergency Use Authorization (EUA) for use by authorized laboratories. Fact sheet for Healthcare Providers: ConventionUpdate.co.nz  Fact sheet for Patients: ConventionUpdate.co.nz       Methodology: Isothermal Nucleic Acid Amplification               Other Studies:  No results found. Current Meds:  Current Facility-Administered Medications   Medication Dose Route Frequency    carvediloL (COREG) tablet 12.5 mg  12.5 mg Oral BID WITH MEALS    sodium chloride (NS) flush 5-10 mL  5-10 mL IntraVENous Q8H    sodium chloride (NS) flush 5-10 mL  5-10 mL IntraVENous PRN    exemestane (AROMASIN) tablet 25 mg (Patient Supplied)  25 mg Oral DAILY    levothyroxine (SYNTHROID) tablet 150 mcg  150 mcg Oral ACB    sodium chloride (NS) flush 5-40 mL  5-40 mL IntraVENous Q8H    sodium chloride (NS) flush 5-40 mL  5-40 mL IntraVENous PRN    acetaminophen (TYLENOL) tablet 650 mg  650 mg Oral Q6H PRN    Or    acetaminophen (TYLENOL) suppository 650 mg  650 mg Rectal Q6H PRN    senna (SENOKOT) tablet 17.2 mg  2 Tablet Oral BID PRN    ondansetron (ZOFRAN) injection 4 mg  4 mg IntraVENous Q6H PRN    cloNIDine HCL (CATAPRES) tablet 0.2 mg  0.2 mg Oral BID PRN    hydrALAZINE (APRESOLINE) 20 mg/mL injection 10 mg  10 mg IntraVENous Q6H PRN    cefTRIAXone (ROCEPHIN) 1 g in 0.9% sodium chloride (MBP/ADV) 50 mL MBP  1 g IntraVENous Q24H    azithromycin (ZITHROMAX) tablet 500 mg  500 mg Oral DAILY    albuterol-ipratropium (DUO-NEB) 2.5 MG-0.5 MG/3 ML  3 mL Nebulization Q4H PRN       Signed:  Terrence Kimball MD    Part of this note may have been written by using a voice dictation software. The note has been proof read but may still contain some grammatical/other typographical errors.

## 2021-12-03 LAB
MM INDURATION POC: 0 MM (ref 0–5)
PPD POC: NEGATIVE NEGATIVE

## 2021-12-03 PROCEDURE — 94760 N-INVAS EAR/PLS OXIMETRY 1: CPT

## 2021-12-03 PROCEDURE — 74011250637 HC RX REV CODE- 250/637: Performed by: INTERNAL MEDICINE

## 2021-12-03 PROCEDURE — 36415 COLL VENOUS BLD VENIPUNCTURE: CPT

## 2021-12-03 PROCEDURE — 74011250637 HC RX REV CODE- 250/637: Performed by: HOSPITALIST

## 2021-12-03 PROCEDURE — 74011250636 HC RX REV CODE- 250/636: Performed by: INTERNAL MEDICINE

## 2021-12-03 PROCEDURE — 74011000258 HC RX REV CODE- 258: Performed by: INTERNAL MEDICINE

## 2021-12-03 PROCEDURE — 97530 THERAPEUTIC ACTIVITIES: CPT

## 2021-12-03 PROCEDURE — 87040 BLOOD CULTURE FOR BACTERIA: CPT

## 2021-12-03 PROCEDURE — 65270000029 HC RM PRIVATE

## 2021-12-03 RX ADMIN — LEVOTHYROXINE SODIUM 150 MCG: 0.15 TABLET ORAL at 05:45

## 2021-12-03 RX ADMIN — CEFEPIME HYDROCHLORIDE 2 G: 2 INJECTION, POWDER, FOR SOLUTION INTRAVENOUS at 20:16

## 2021-12-03 RX ADMIN — Medication 10 ML: at 14:00

## 2021-12-03 RX ADMIN — AZITHROMYCIN MONOHYDRATE 500 MG: 250 TABLET ORAL at 08:25

## 2021-12-03 RX ADMIN — CEFEPIME HYDROCHLORIDE 2 G: 2 INJECTION, POWDER, FOR SOLUTION INTRAVENOUS at 08:25

## 2021-12-03 RX ADMIN — CARVEDILOL 12.5 MG: 12.5 TABLET, FILM COATED ORAL at 17:29

## 2021-12-03 RX ADMIN — Medication 10 ML: at 05:45

## 2021-12-03 RX ADMIN — Medication 10 ML: at 21:43

## 2021-12-03 RX ADMIN — CARVEDILOL 12.5 MG: 12.5 TABLET, FILM COATED ORAL at 08:25

## 2021-12-03 NOTE — PROGRESS NOTES
Hospitalist Progress Note   Admit Date:  2021  1:06 PM   Name:  John Vail   Age:  77 y.o. Sex:  female  :  1955   MRN:  [id number]   [facility]    Presenting Complaint: Blood infection    Reason(s) for Admission: CAP (community acquired pneumonia) [J18.9]     Hospital Course & Interval History:     Patient with past medical history of  Hypothyroidism  CABG  Atrial fibrillation  PUD  GI bleeding  Hypertension  Prediabetes  Breast cancer  Patient presented to hospital with generalized weakness with change in mental status, according to the son. Patient was found to have infiltration in the lung field. She is diagnosed as community-acquired bacterial pneumonia. She is started on empiric antibiotic. Subjective (21):    2021  No fever today. No chest pain and no shortness of breath. .   Breathing better. No hemoptysis. Tmax in the past 24 hours is 101.4 F    2021  No fever in the past 24 hours. Patient is eating better. No shortness of breath. No chest pain. December 3, 2021  Patient has maximum temperature of 100.6 Fahrenheit in the past 24 hours. She has been eating okay. No chills. Assessment & Plan:     Principal Problem:    CAP (community acquired pneumonia) (2021)  Continue current empiric antibiotic  She is on ceftriaxone, and azithromycin. Due to fever, will do blood culture. Change ceftriaxone to cefepime. Symptomatic treatment  Oxygen supplementation as needed  Monitor closely  Continue physical therapy    Active Problems:    Acquired hypothyroidism (10/5/2016)  levothyroxine 150 mcg p.o. once a day      Mixed hyperlipidemia (10/5/2016)  Noted      Essential hypertension (2017)  On carvedilol  Monitor blood pressure  Blood pressure today is 157/97 mmHg    I have discussed the plan of care with patient.           Dispo/Discharge Planning:    Likely can go home in 1-2 days      Diet:  ADULT DIET Regular; 4 carb choices (60 gm/meal)  DVT PPx: SCD  Code status: Full Code    Hospital Problems as of 12/3/2021 Date Reviewed: 2/15/2021          Codes Class Noted - Resolved POA    * (Principal) CAP (community acquired pneumonia) ICD-10-CM: J18.9  ICD-9-CM: 796  11/30/2021 - Present Unknown        Essential hypertension ICD-10-CM: I10  ICD-9-CM: 401.9  1/4/2017 - Present Yes        Acquired hypothyroidism ICD-10-CM: E03.9  ICD-9-CM: 244.9  10/5/2016 - Present Yes        Mixed hyperlipidemia ICD-10-CM: E78.2  ICD-9-CM: 272.2  10/5/2016 - Present Yes              Objective:     Patient Vitals for the past 24 hrs:   Temp Pulse Resp BP SpO2   12/03/21 0739 98.4 °F (36.9 °C) 71 18 (!) 157/97 99 %   12/03/21 0402 98.6 °F (37 °C) 66 18 130/76 98 %   12/02/21 2319 (!) 100.6 °F (38.1 °C) 69 18 (!) 140/82 100 %   12/02/21 1919 99.2 °F (37.3 °C) 74 18 129/64 100 %   12/02/21 1630 99.1 °F (37.3 °C) 67 16 (!) 144/71 98 %   12/02/21 1133 98.4 °F (36.9 °C) 70 16 133/87 100 %     Oxygen Therapy  O2 Sat (%): 99 % (12/03/21 0739)  Pulse via Oximetry: 63 beats per minute (11/30/21 2030)  O2 Device: None (Room air) (12/03/21 0739)    Estimated body mass index is 24.45 kg/m² as calculated from the following:    Height as of this encounter: 5' 10\" (1.778 m). Weight as of this encounter: 77.3 kg (170 lb 6.4 oz). Intake/Output Summary (Last 24 hours) at 12/3/2021 0931  Last data filed at 12/3/2021 0441  Gross per 24 hour   Intake --   Output 600 ml   Net -600 ml         Physical Exam:     Blood pressure (!) 157/97, pulse 71, temperature 98.4 °F (36.9 °C), resp. rate 18, height 5' 10\" (1.778 m), weight 77.3 kg (170 lb 6.4 oz), SpO2 99 %. General:    Well nourished. No overt distress, patient is sitting up in bed eating breakfast .   head:  Normocephalic, atraumatic  Eyes:  Sclerae appear normal.  Pupils equally round. ENT:  Nares appear normal, no drainage. Moist oral mucosa  Neck:  No restricted ROM. Trachea midline   CV:   RRR.   No m/r/g.  No jugular venous distension. Lungs:   CTAB. No wheezing, rhonchi, or rales. Respirations even, unlabored  Abdomen: Bowel sounds present. Soft, nontender, nondistended. Extremities: No cyanosis or clubbing. No edema  Skin:     No rashes and normal coloration. Warm and dry. Neuro:  CN II-XII grossly intact. Sensation intact. A&Ox3  Psych:  Normal mood and affect.       I have reviewed ordered lab tests and independently visualized imaging below:    Recent Labs:  Recent Results (from the past 48 hour(s))   METABOLIC PANEL, BASIC    Collection Time: 12/01/21 12:33 PM   Result Value Ref Range    Sodium 140 136 - 145 mmol/L    Potassium 3.5 3.5 - 5.1 mmol/L    Chloride 107 98 - 107 mmol/L    CO2 25 21 - 32 mmol/L    Anion gap 8 7 - 16 mmol/L    Glucose 94 65 - 100 mg/dL    BUN 15 8 - 23 MG/DL    Creatinine 1.17 (H) 0.6 - 1.0 MG/DL    GFR est AA 59 (L) >60 ml/min/1.73m2    GFR est non-AA 49 (L) >60 ml/min/1.73m2    Calcium 8.3 8.3 - 10.4 MG/DL   CBC W/O DIFF    Collection Time: 12/01/21 12:33 PM   Result Value Ref Range    WBC 5.6 4.3 - 11.1 K/uL    RBC 2.64 (L) 4.05 - 5.2 M/uL    HGB 8.9 (L) 11.7 - 15.4 g/dL    HCT 26.6 (L) 35.8 - 46.3 %    .8 (H) 79.6 - 97.8 FL    MCH 33.7 (H) 26.1 - 32.9 PG    MCHC 33.5 31.4 - 35.0 g/dL    RDW 12.7 11.9 - 14.6 %    PLATELET 426 (L) 190 - 450 K/uL    MPV 10.1 9.4 - 12.3 FL    ABSOLUTE NRBC 0.00 0.0 - 0.2 K/uL   PLEASE READ & DOCUMENT PPD TEST IN 24 HRS    Collection Time: 12/01/21 11:45 PM   Result Value Ref Range    PPD Negative Negative    mm Induration 0 0 - 5 mm   PLEASE READ & DOCUMENT PPD TEST IN 48 HRS    Collection Time: 12/02/21 11:45 PM   Result Value Ref Range    PPD Negative Negative    mm Induration 0 0 - 5 mm       All Micro Results     Procedure Component Value Units Date/Time    CULTURE, BLOOD [[id number]]     Order Status: Sent Specimen: Blood     BLOOD CULTURE [[id number]] Collected: 11/30/21 1438    Order Status: Completed Specimen: Blood Updated: 12/03/21 0621     Special Requests: --        LEFT  HAND       Culture result: NO GROWTH 3 DAYS       BLOOD CULTURE [[id number]] Collected: 11/30/21 1313    Order Status: Completed Specimen: Blood Updated: 12/03/21 0621     Special Requests: RIGHT ANTECUBITAL        Culture result: NO GROWTH 3 DAYS       CULTURE, URINE [[id number]] Collected: 11/30/21 1333    Order Status: Completed Specimen: Cath Urine Updated: 12/02/21 0831     Special Requests: NO SPECIAL REQUESTS        Culture result: NO GROWTH 2 DAYS       COVID-19 RAPID TEST [[id number]] Collected: 11/30/21 1400    Order Status: Completed Specimen: Nasopharyngeal Updated: 11/30/21 1419     Specimen source Nasopharyngeal        COVID-19 rapid test Not detected        Comment:      The specimen is NEGATIVE for SARS-CoV-2, the novel coronavirus associated with COVID-19. A negative result does not rule out COVID-19. This test has been authorized by the FDA under an Emergency Use Authorization (EUA) for use by authorized laboratories. Fact sheet for Healthcare Providers: CLIPPATEdate.co.nz  Fact sheet for Patients: CLIPPATEdaSemant.[email or web address].co.nz       Methodology: Isothermal Nucleic Acid Amplification               Other Studies:  No results found.     Current Meds:  Current Facility-Administered Medications   Medication Dose Route Frequency    cefepime (MAXIPIME) 2 g in 0.9% sodium chloride (MBP/ADV) 100 mL MBP  2 g IntraVENous Q12H    carvediloL (COREG) tablet 12.5 mg  12.5 mg Oral BID WITH MEALS    sodium chloride (NS) flush 5-10 mL  5-10 mL IntraVENous Q8H    sodium chloride (NS) flush 5-10 mL  5-10 mL IntraVENous PRN    exemestane (AROMASIN) tablet 25 mg (Patient Supplied)  25 mg Oral DAILY    levothyroxine (SYNTHROID) tablet 150 mcg  150 mcg Oral ACB    sodium chloride (NS) flush 5-40 mL  5-40 mL IntraVENous Q8H    sodium chloride (NS) flush 5-40 mL  5-40 mL IntraVENous PRN    acetaminophen (TYLENOL) tablet 650 mg  650 mg Oral Q6H PRN    Or    acetaminophen (TYLENOL) suppository 650 mg  650 mg Rectal Q6H PRN    senna (SENOKOT) tablet 17.2 mg  2 Tablet Oral BID PRN    ondansetron (ZOFRAN) injection 4 mg  4 mg IntraVENous Q6H PRN    cloNIDine HCL (CATAPRES) tablet 0.2 mg  0.2 mg Oral BID PRN    hydrALAZINE (APRESOLINE) 20 mg/mL injection 10 mg  10 mg IntraVENous Q6H PRN    azithromycin (ZITHROMAX) tablet 500 mg  500 mg Oral DAILY    albuterol-ipratropium (DUO-NEB) 2.5 MG-0.5 MG/3 ML  3 mL Nebulization Q4H PRN       Signed:  Av Mora MD    Part of this note may have been written by using a voice dictation software. The note has been proof read but may still contain some grammatical/other typographical errors.

## 2021-12-03 NOTE — PROGRESS NOTES
ACUTE PHYSICAL THERAPY GOALS:  (Developed with and agreed upon by patient and/or caregiver. )  LT. Pt will be able to perform bed mobility and transfers with independence in order to be able to safely function within their home within 7 treatment days. 2. Pt will be able to ambulate a minimum of 75 ft with modified independence and use of least restrictive device in order to return to home and community ambulation within 7 treatment days. 3. Pt will be able to ambulate up/down 4 step with supervision in order to access his/her home safely within 7 treatment days. 4. Pt will demonstrate standing and sitting balance with indepedence in order to safely perform functional mobility and ADLS within 7 treatment days. PHYSICAL THERAPY: Daily Note and AM Treatment Day # 2    Melissa Garcia is a 77 y.o. female   PRIMARY DIAGNOSIS: CAP (community acquired pneumonia)  CAP (community acquired pneumonia) [J18.9]         ASSESSMENT:     REHAB RECOMMENDATIONS: CURRENT LEVEL OF FUNCTION:  (Most Recently Demonstrated)   Recommendation to date pending progress:  Setting:   Home health vs. outpatient  Equipment:    Rolling Walker Bed Mobility:   Minimal Assistance  Sit to Stand:   Contact Guard Assistance  Transfers:   Contact Guard Assistance  Gait/Mobility:   Contact Guard Assistance     ASSESSMENT:  Ms. Alfreda Valladares continues to have slight confusion during session today as well as decreased processing time, occasionally forgetting what she is trying to do when performing functional mobility. She was able to ambulate longer distance this session using a RW, but moves very slowly with short step lengths, demonstrating decreased safety as a result. Pt is presenting below her functional baseline at this time and would continue to benefit from skilled PT.      SUBJECTIVE:   Ms. Alfreda Valladares states, \"my toothpaste is stuck (still in my mouth). \"    SOCIAL HISTORY/ LIVING ENVIRONMENT:   Home Environment: Private residence  # Steps to Enter: 4 (2 and then 2 more)  One/Two Story Residence: One story  Living Alone: No  Support Systems: Child(annamaria) (Son)  OBJECTIVE:     PAIN: VITAL SIGNS: LINES/DRAINS:   Pre Treatment: Pain Screen  Pain Scale 1: Numeric (0 - 10)  Pain Intensity 1:  (pt verbalizing no pain)  Post Treatment: 0/10 Vital Signs  O2 Device: None (Room air) IV and Purewick  O2 Device: None (Room air)     MOBILITY: I Mod I S SBA CGA Min Mod Max Total  NT x2 Comments:   Bed Mobility    Rolling [] [] [x] [] [] [] [] [] [] [] []    Supine to Sit [] [] [] [] [x] [x] [] [] [] [] [] Cues to push up from bed   Scooting [] [] [x] [] [] [] [] [] [] [] []    Sit to Supine [] [] [x] [] [] [] [] [] [] [] []    Transfers    Sit to Stand [] [] [] [] [x] [] [] [] [] [] [] Cues to push up from bed   Bed to Chair [] [] [] [] [x] [] [] [] [] [] []    Stand to Sit [] [] [] [] [x] [] [] [] [] [] [] Cues to reach back for bed   I=Independent, Mod I=Modified Independent, S=Supervision, SBA=Standby Assistance, CGA=Contact Guard Assistance,   Min=Minimal Assistance, Mod=Moderate Assistance, Max=Maximal Assistance, Total=Total Assistance, NT=Not Tested    BALANCE: Good Fair+ Fair Fair- Poor NT Comments   Sitting Static [] [x] [] [] [] []    Sitting Dynamic [] [x] [] [] [] []              Standing Static [] [] [x] [] [] []    Standing Dynamic [] [] [x] [] [] []      GAIT: I Mod I S SBA CGA Min Mod Max Total  NT x2 Comments:   Level of Assistance [] [] [x] [] [x] [] [] [] [] [] [] Pt moving very slowly throughout ambulation, required cues for longer step length and maintaining close proximity to RW (and rolling it forward rather than lifting it up)   Distance 10 feet to bathroom no AD, 2 bouts of 15-20 feet in room with RW    DME Rolling Walker    Gait Quality Significantly decreased B step length, forward trunk lean, slower gait speed, decreased proximity to RW    Weightbearing  Status N/A     I=Independent, Mod I=Modified Independent, S=Supervision, SBA=Standby Assistance, CGA=Contact [employer],   Min=Minimal Assistance, Mod=Moderate Assistance, Max=Maximal Assistance, Total=Total Assistance, NT=Not Tested    PLAN:   FREQUENCY/DURATION: PT Plan of Care: 3 times/week for duration of hospital stay or until stated goals are met, whichever comes first.  TREATMENT:     TREATMENT:   ($$ Therapeutic Activity: 23-37 mins    )  Therapeutic Activity (25 Minutes): Therapeutic activity included Rolling, Supine to Sit, Sit to Supine, Scooting, Transfer Training, Ambulation on level ground, Sitting balance  and Standing balance to improve functional Mobility, Strength and Activity tolerance.     TREATMENT GRID:  N/A    AFTER TREATMENT POSITION/PRECAUTIONS:  Alarm Activated, Bed, Needs within reach and RN notified    INTERDISCIPLINARY COLLABORATION:  RN/PCT and PT/PTA    TOTAL TREATMENT DURATION:  PT Patient Time In/Time Out  Time In: 0850  Time Out: [address] Dockery DPT

## 2021-12-03 NOTE — PROGRESS NOTES
Physician Progress Note      Percy Modi  CSN #:                  [id number]  :                       1955  ADMIT DATE:       2021 1:06 PM  DISCH DATE:  RESPONDING  PROVIDER #:        Dedra Wills MD          QUERY TEXT:    Pt admitted with CAP and Sepsis. Pt noted to have an elevated CR--1.37 . If possible, please document in the progress notes and discharge summary if you are evaluating and/or treating any of the following: The medical record reflects the following:  Risk Factors: HTN, prediabetes, CAP,Sepsis, AFIB. Anemia  Clinical Indicators: cr on admission 1.37. ..--1.17. . No hx of CKD found. Lowest cr found was . 89. Treatment: iv fluids, tx of infection, labs, cultures. cxray, home meds. Defined by Kidney Disease Improving Global Outcomes (KDIGO) clinical practice guideline for acute kidney injury:?  -Increase in?SCr?by greater than or equal to 0.3 mg/dl within 48?hours;?or?  -Increase or decrease in?SCr?to greater than or equal to 1.5 times baseline, which is known or presumed to have occurred within the prior 7?days;?or?  -Urine volume < 0.5ml/kg/h for 6 hours  Options provided:  -- Acute kidney failure  -- Acute kidney failure with acute tubular necrosis  -- Acute kidney injury  -- Other - I will add my own diagnosis  -- Disagree - Not applicable / Not valid  -- Disagree - Clinically unable to determine / Unknown  -- Refer to Clinical Documentation Reviewer    PROVIDER RESPONSE TEXT:    This patient is in Acute kidney failure.     Query created by: Jose Carrera on 12/3/2021 10:24 AM      Electronically signed by:  Dedra Wills MD 12/3/2021 3:14 PM

## 2021-12-04 PROCEDURE — 74011000258 HC RX REV CODE- 258: Performed by: INTERNAL MEDICINE

## 2021-12-04 PROCEDURE — 74011250636 HC RX REV CODE- 250/636: Performed by: INTERNAL MEDICINE

## 2021-12-04 PROCEDURE — 65270000029 HC RM PRIVATE

## 2021-12-04 PROCEDURE — 74011250637 HC RX REV CODE- 250/637: Performed by: HOSPITALIST

## 2021-12-04 PROCEDURE — 74011250637 HC RX REV CODE- 250/637: Performed by: INTERNAL MEDICINE

## 2021-12-04 RX ORDER — FAMOTIDINE 20 MG/1
40 TABLET, FILM COATED ORAL DAILY
Status: DISCONTINUED | OUTPATIENT
Start: 2021-12-05 | End: 2021-12-06 | Stop reason: HOSPADM

## 2021-12-04 RX ORDER — ATORVASTATIN CALCIUM 80 MG/1
80 TABLET, FILM COATED ORAL
Status: DISCONTINUED | OUTPATIENT
Start: 2021-12-04 | End: 2021-12-06 | Stop reason: HOSPADM

## 2021-12-04 RX ADMIN — Medication 10 ML: at 21:54

## 2021-12-04 RX ADMIN — CEFEPIME HYDROCHLORIDE 2 G: 2 INJECTION, POWDER, FOR SOLUTION INTRAVENOUS at 21:54

## 2021-12-04 RX ADMIN — CEFEPIME HYDROCHLORIDE 2 G: 2 INJECTION, POWDER, FOR SOLUTION INTRAVENOUS at 08:41

## 2021-12-04 RX ADMIN — Medication 10 ML: at 21:53

## 2021-12-04 RX ADMIN — CARVEDILOL 12.5 MG: 12.5 TABLET, FILM COATED ORAL at 08:41

## 2021-12-04 RX ADMIN — Medication 10 ML: at 06:23

## 2021-12-04 RX ADMIN — AZITHROMYCIN MONOHYDRATE 500 MG: 250 TABLET ORAL at 11:19

## 2021-12-04 RX ADMIN — Medication 10 ML: at 16:55

## 2021-12-04 RX ADMIN — ATORVASTATIN CALCIUM 80 MG: 80 TABLET, FILM COATED ORAL at 21:53

## 2021-12-04 RX ADMIN — CARVEDILOL 12.5 MG: 12.5 TABLET, FILM COATED ORAL at 17:32

## 2021-12-04 RX ADMIN — LEVOTHYROXINE SODIUM 150 MCG: 0.15 TABLET ORAL at 06:23

## 2021-12-04 NOTE — PROGRESS NOTES
Repositioned in bed HOB elevated set up breakfast tray, alert, oriented to person, no acute distress, independent with feeding.

## 2021-12-04 NOTE — PROGRESS NOTES
Hospitalist Progress Note   Admit Date:  2021  1:06 PM   Name:  Antonieta Lobo   Age:  77 y.o. Sex:  female  :  1955   MRN:  [id number]   Room:  [facility]    Presenting Complaint: Blood infection    Reason(s) for Admission: CAP (community acquired pneumonia) [J18.9]     Hospital Course & Interval History:     Patient with past medical history of  Hypothyroidism  CABG  Atrial fibrillation  PUD  GI bleeding  Hypertension  Prediabetes  Breast cancer  Patient presented to hospital with generalized weakness with change in mental status, according to the son. Patient was found to have infiltration in the lung field. She is diagnosed as community-acquired bacterial pneumonia. She is started on empiric antibiotic. Subjective (21):    2021  No fever today. No chest pain and no shortness of breath. .   Breathing better. No hemoptysis. Tmax in the past 24 hours is 101.4 F    2021  No fever in the past 24 hours. Patient is eating better. No shortness of breath. No chest pain. December 3, 2021  Patient has maximum temperature of 100.6 Fahrenheit in the past 24 hours. She has been eating okay. No chills. 2021  Temperature is 100.3 °F in the past 24 hours. Patient is not coughing much. Afebrile now. Eating well. No chills. Assessment & Plan:     Principal Problem:    CAP (community acquired pneumonia) (2021)  Continue current empiric antibiotic  She was on ceftriaxone, and azithromycin. Repeated blood culture has been negative  Changed ceftriaxone to cefepime.    Symptomatic treatment  Oxygen supplementation as needed  Continue physical therapy  We will continue to monitor closely    Active Problems:    Acquired hypothyroidism (10/5/2016)  levothyroxine 150 mcg p.o. once a day      Mixed hyperlipidemia (10/5/2016)  Noted      Essential hypertension (2017)  On carvedilol  Monitor blood pressure  Blood pressure today is 154/92 mmHg    I have discussed the plan of care with patient. Dispo/Discharge Planning:    Hopefully she can go home in 1-2 days      Diet:  ADULT DIET Regular; 4 carb choices (60 gm/meal)  DVT PPx: SCD  Code status: Full Code    Hospital Problems as of 12/4/2021 Date Reviewed: 2/15/2021          Codes Class Noted - Resolved POA    * (Principal) CAP (community acquired pneumonia) ICD-10-CM: J18.9  ICD-9-CM: 820  11/30/2021 - Present Unknown        Essential hypertension ICD-10-CM: I10  ICD-9-CM: 401.9  1/4/2017 - Present Yes        Acquired hypothyroidism ICD-10-CM: E03.9  ICD-9-CM: 244.9  10/5/2016 - Present Yes        Mixed hyperlipidemia ICD-10-CM: E78.2  ICD-9-CM: 272.2  10/5/2016 - Present Yes              Objective:     Patient Vitals for the past 24 hrs:   Temp Pulse Resp BP SpO2   12/04/21 0738 98.8 °F (37.1 °C) 88 18 -- 99 %   12/04/21 0334 99 °F (37.2 °C) 70 18 (!) 154/92 97 %   12/04/21 0029 100.3 °F (37.9 °C) 72 18 (!) 149/92 100 %   12/03/21 2039 -- -- -- -- 94 %   12/03/21 1940 100 °F (37.8 °C) 70 18 (!) 147/87 97 %   12/03/21 1723 98.1 °F (36.7 °C) 88 18 132/83 99 %   12/03/21 1315 98.5 °F (36.9 °C) 86 18 137/85 98 %     Oxygen Therapy  O2 Sat (%): 99 % (12/04/21 0738)  Pulse via Oximetry: 74 beats per minute (12/03/21 2039)  O2 Device: None (Room air) (12/04/21 4699)  Skin Assessment: Clean, dry, & intact (12/03/21 1930)  Skin Protection for O2 Device: No (12/03/21 1930)  O2 Flow Rate (L/min): 40 l/min (12/03/21 1930)  FIO2 (%): 45 % (12/03/21 1930)    Estimated body mass index is 24.45 kg/m² as calculated from the following:    Height as of this encounter: 5' 10\" (1.778 m). Weight as of this encounter: 77.3 kg (170 lb 6.4 oz). Intake/Output Summary (Last 24 hours) at 12/4/2021 0848  Last data filed at 12/3/2021 1315  Gross per 24 hour   Intake --   Output 300 ml   Net -300 ml         Physical Exam:     Blood pressure (!) 154/92, pulse 88, temperature 98.8 °F (37.1 °C), resp.  rate 18, height 5' 10\" (1.778 m), weight 77.3 kg (170 lb 6.4 oz), SpO2 99 %. General:    Well nourished. No overt distress, patient is sitting up in bed.   head:  Normocephalic, atraumatic  Eyes:  Sclerae appear normal.  Pupils equally round. ENT:  Nares appear normal, no drainage. Moist oral mucosa  Neck:  No restricted ROM. Trachea midline   CV:   RRR. No m/r/g. No jugular venous distension. Lungs:   CTAB. No wheezing, rhonchi, or rales. Respirations even, unlabored  Abdomen: Bowel sounds present. Soft, nontender, nondistended. Extremities: No cyanosis or clubbing. No edema  Skin:     No rashes and normal coloration. Warm and dry. Neuro:  CN II-XII grossly intact. Sensation intact. A&Ox3  Psych:  Normal mood and affect.       I have reviewed ordered lab tests and independently visualized imaging below:    Recent Labs:  Recent Results (from the past 48 hour(s))   PLEASE READ & DOCUMENT PPD TEST IN 48 HRS    Collection Time: 12/02/21 11:45 PM   Result Value Ref Range    PPD Negative Negative    mm Induration 0 0 - 5 mm   CULTURE, BLOOD    Collection Time: 12/03/21 10:45 AM    Specimen: Blood   Result Value Ref Range    Special Requests: RIGHT  HAND        Culture result: NO GROWTH AFTER 19 HOURS         All Micro Results     Procedure Component Value Units Date/Time    CULTURE, BLOOD [[id number]] Collected: 12/03/21 1045    Order Status: Completed Specimen: Blood Updated: 12/04/21 0636     Special Requests: --        RIGHT  HAND       Culture result: NO GROWTH AFTER 19 HOURS       BLOOD CULTURE [[id number]] Collected: 11/30/21 1438    Order Status: Completed Specimen: Blood Updated: 12/04/21 0635     Special Requests: --        LEFT  HAND       Culture result: NO GROWTH 4 DAYS       BLOOD CULTURE [[id number]] Collected: 11/30/21 1313    Order Status: Completed Specimen: Blood Updated: 12/04/21 0635     Special Requests: RIGHT ANTECUBITAL        Culture result: NO GROWTH 4 DAYS       CULTURE, URINE [[id number]] Collected: 11/30/21 1333    Order Status: Completed Specimen: Cath Urine Updated: 12/02/21 0831     Special Requests: NO SPECIAL REQUESTS        Culture result: NO GROWTH 2 DAYS       COVID-19 RAPID TEST [[id number]] Collected: 11/30/21 1400    Order Status: Completed Specimen: Nasopharyngeal Updated: 11/30/21 1419     Specimen source Nasopharyngeal        COVID-19 rapid test Not detected        Comment:      The specimen is NEGATIVE for SARS-CoV-2, the novel coronavirus associated with COVID-19. A negative result does not rule out COVID-19. This test has been authorized by the FDA under an Emergency Use Authorization (EUA) for use by authorized laboratories. Fact sheet for Healthcare Providers: [employer]date.co.nz  Fact sheet for Patients: [employer].co.nz       Methodology: Isothermal Nucleic Acid Amplification               Other Studies:  No results found.     Current Meds:  Current Facility-Administered Medications   Medication Dose Route Frequency    cefepime (MAXIPIME) 2 g in 0.9% sodium chloride (MBP/ADV) 100 mL MBP  2 g IntraVENous Q12H    carvediloL (COREG) tablet 12.5 mg  12.5 mg Oral BID WITH MEALS    sodium chloride (NS) flush 5-10 mL  5-10 mL IntraVENous Q8H    sodium chloride (NS) flush 5-10 mL  5-10 mL IntraVENous PRN    exemestane (AROMASIN) tablet 25 mg (Patient Supplied)  25 mg Oral DAILY    levothyroxine (SYNTHROID) tablet 150 mcg  150 mcg Oral ACB    sodium chloride (NS) flush 5-40 mL  5-40 mL IntraVENous Q8H    sodium chloride (NS) flush 5-40 mL  5-40 mL IntraVENous PRN    acetaminophen (TYLENOL) tablet 650 mg  650 mg Oral Q6H PRN    Or    acetaminophen (TYLENOL) suppository 650 mg  650 mg Rectal Q6H PRN    senna (SENOKOT) tablet 17.2 mg  2 Tablet Oral BID PRN    ondansetron (ZOFRAN) injection 4 mg  4 mg IntraVENous Q6H PRN    cloNIDine HCL (CATAPRES) tablet 0.2 mg  0.2 mg Oral BID PRN    hydrALAZINE (APRESOLINE) 20 mg/mL injection 10 mg  10 mg IntraVENous Q6H PRN    azithromycin (ZITHROMAX) tablet 500 mg  500 mg Oral DAILY    albuterol-ipratropium (DUO-NEB) 2.5 MG-0.5 MG/3 ML  3 mL Nebulization Q4H PRN       Signed:  Keyona Curry MD    Part of this note may have been written by using a voice dictation software. The note has been proof read but may still contain some grammatical/other typographical errors.

## 2021-12-05 LAB
BACTERIA SPEC CULT: NORMAL
BACTERIA SPEC CULT: NORMAL
SERVICE CMNT-IMP: NORMAL
SERVICE CMNT-IMP: NORMAL

## 2021-12-05 PROCEDURE — 74011250636 HC RX REV CODE- 250/636: Performed by: INTERNAL MEDICINE

## 2021-12-05 PROCEDURE — 65270000029 HC RM PRIVATE

## 2021-12-05 PROCEDURE — 74011000258 HC RX REV CODE- 258: Performed by: INTERNAL MEDICINE

## 2021-12-05 PROCEDURE — 74011250637 HC RX REV CODE- 250/637: Performed by: INTERNAL MEDICINE

## 2021-12-05 PROCEDURE — 74011250637 HC RX REV CODE- 250/637: Performed by: HOSPITALIST

## 2021-12-05 RX ADMIN — Medication 10 ML: at 05:07

## 2021-12-05 RX ADMIN — Medication 10 ML: at 21:42

## 2021-12-05 RX ADMIN — LEVOTHYROXINE SODIUM 150 MCG: 0.15 TABLET ORAL at 05:07

## 2021-12-05 RX ADMIN — CARVEDILOL 12.5 MG: 12.5 TABLET, FILM COATED ORAL at 09:26

## 2021-12-05 RX ADMIN — ATORVASTATIN CALCIUM 80 MG: 80 TABLET, FILM COATED ORAL at 21:42

## 2021-12-05 RX ADMIN — Medication 10 ML: at 16:29

## 2021-12-05 RX ADMIN — CARVEDILOL 12.5 MG: 12.5 TABLET, FILM COATED ORAL at 16:28

## 2021-12-05 RX ADMIN — SERTRALINE 150 MG: 100 TABLET, FILM COATED ORAL at 09:26

## 2021-12-05 RX ADMIN — CEFEPIME HYDROCHLORIDE 2 G: 2 INJECTION, POWDER, FOR SOLUTION INTRAVENOUS at 09:26

## 2021-12-05 RX ADMIN — CEFEPIME HYDROCHLORIDE 2 G: 2 INJECTION, POWDER, FOR SOLUTION INTRAVENOUS at 20:30

## 2021-12-05 RX ADMIN — FAMOTIDINE 40 MG: 20 TABLET ORAL at 09:26

## 2021-12-05 RX ADMIN — AZITHROMYCIN MONOHYDRATE 500 MG: 250 TABLET ORAL at 09:26

## 2021-12-05 RX ADMIN — CLONIDINE HYDROCHLORIDE 0.2 MG: 0.1 TABLET ORAL at 00:59

## 2021-12-05 NOTE — PROGRESS NOTES
Hospitalist Progress Note   Admit Date:  2021  1:06 PM   Name:  Melissa Garcia   Age:  77 y.o. Sex:  female  :  1955   MRN:  [id number]   Room:  [facility]    Presenting Complaint: Blood infection    Reason(s) for Admission: CAP (community acquired pneumonia) [J18.9]     Hospital Course & Interval History:     Patient with past medical history of  Hypothyroidism  CABG  Atrial fibrillation  PUD  GI bleeding  Hypertension  Prediabetes  Breast cancer  Patient presented to hospital with generalized weakness with change in mental status, according to the son. Patient was found to have infiltration in the lung field. She is diagnosed as community-acquired bacterial pneumonia. She is started on empiric antibiotic. Subjective (21):    2021  No fever today. No chest pain and no shortness of breath. .   Breathing better. No hemoptysis. Tmax in the past 24 hours is 101.4 F    2021  No fever in the past 24 hours. Patient is eating better. No shortness of breath. No chest pain. December 3, 2021  Patient has maximum temperature of 100.6 Fahrenheit in the past 24 hours. She has been eating okay. No chills. 2021  Temperature is 100.3 °F in the past 24 hours. Patient is not coughing much. Afebrile now. Eating well. No chills. 21   No fever in the last 24 hours. Temperature is 99.5 Fahrenheit. Patient is eating well  No shortness of breath  No chest pain  No chills    Assessment & Plan:     Principal Problem:  CAP (community acquired pneumonia) (2021)  Continue current empiric antibiotic  She was on ceftriaxone, and azithromycin. Repeated blood culture has been negative  Changed ceftriaxone to cefepime. Symptomatic treatment  Oxygen supplementation as needed  Continue physical therapy  We will continue to monitor closely  Clinically patient seems to be better, , with less temperature.      Active Problems:    Acquired hypothyroidism (10/5/2016)  levothyroxine 150 mcg p.o. once a day      Mixed hyperlipidemia (10/5/2016)  Noted      Essential hypertension (1/4/2017)  On carvedilol  Monitor blood pressure  Blood pressure today is 133/71 mmHg    I have discussed the plan of care with patient. Dispo/Discharge Planning:    Hopefully she can go home in 1-2 days      Diet:  ADULT DIET Regular; 4 carb choices (60 gm/meal)  DVT PPx: SCD  Code status: Full Code    Hospital Problems as of 12/5/2021 Date Reviewed: 2/15/2021          Codes Class Noted - Resolved POA    * (Principal) CAP (community acquired pneumonia) ICD-10-CM: J18.9  ICD-9-CM: 807  11/30/2021 - Present Unknown        Essential hypertension ICD-10-CM: I10  ICD-9-CM: 401.9  1/4/2017 - Present Yes        Acquired hypothyroidism ICD-10-CM: E03.9  ICD-9-CM: 244.9  10/5/2016 - Present Yes        Mixed hyperlipidemia ICD-10-CM: E78.2  ICD-9-CM: 272.2  10/5/2016 - Present Yes              Objective:     Patient Vitals for the past 24 hrs:   Temp Pulse Resp BP SpO2   12/05/21 0715 98.3 °F (36.8 °C) 64 18 133/71 98 %   12/05/21 0403 98.2 °F (36.8 °C) 64 16 (!) 140/83 96 %   12/05/21 0050 98.4 °F (36.9 °C) 87 18 (!) 173/120 98 %   12/04/21 2024 99.5 °F (37.5 °C) 73 18 (!) 146/84 100 %   12/04/21 1742 98.4 °F (36.9 °C) 77 18 (!) 157/83 100 %   12/04/21 1140 98.7 °F (37.1 °C) 70 18 131/81 97 %     Oxygen Therapy  O2 Sat (%): 98 % (12/05/21 0715)  Pulse via Oximetry: 74 beats per minute (12/03/21 2039)  O2 Device: None (Room air) (12/04/21 1930)  Skin Assessment: Clean, dry, & intact (12/03/21 1930)  Skin Protection for O2 Device: No (12/03/21 1930)  O2 Flow Rate (L/min): 40 l/min (12/03/21 1930)  FIO2 (%): 45 % (12/03/21 1930)    Estimated body mass index is 24.45 kg/m² as calculated from the following:    Height as of this encounter: 5' 10\" (1.778 m). Weight as of this encounter: 77.3 kg (170 lb 6.4 oz).     Intake/Output Summary (Last 24 hours) at 12/5/2021 1002  Last data filed at 12/4/2021 1445  Gross per 24 hour   Intake --   Output 725 ml   Net -725 ml         Physical Exam:     Blood pressure 133/71, pulse 64, temperature 98.3 °F (36.8 °C), resp. rate 18, height 5' 10\" (1.778 m), weight 77.3 kg (170 lb 6.4 oz), SpO2 98 %. General:    Well nourished. No overt distress, patient is sitting up in bed. She is eating breakfast.   head:  Normocephalic, atraumatic  Eyes:  Sclerae appear normal.  Pupils equally round. ENT:  Nares appear normal, no drainage. Moist oral mucosa  Neck:  No restricted ROM. Trachea midline   CV:   RRR. No m/r/g. No jugular venous distension. Lungs:   CTAB. No wheezing, rhonchi, or rales. Respirations even, unlabored  Abdomen: Bowel sounds present. Soft, nontender, nondistended. Extremities: No cyanosis or clubbing. No edema  Skin:     No rashes and normal coloration. Warm and dry. Neuro:  CN II-XII grossly intact. Sensation intact. A&Ox3  Psych:  Normal mood and affect.       I have reviewed ordered lab tests and independently visualized imaging below:    Recent Labs:  Recent Results (from the past 48 hour(s))   CULTURE, BLOOD    Collection Time: 12/03/21 10:45 AM    Specimen: Blood   Result Value Ref Range    Special Requests: RIGHT  HAND        Culture result: NO GROWTH 2 DAYS         All Micro Results     Procedure Component Value Units Date/Time    CULTURE, BLOOD [[id number]] Collected: 12/03/21 1045    Order Status: Completed Specimen: Blood Updated: 12/05/21 0718     Special Requests: --        RIGHT  HAND       Culture result: NO GROWTH 2 DAYS       BLOOD CULTURE [[id number]] Collected: 11/30/21 1438    Order Status: Completed Specimen: Blood Updated: 12/05/21 0717     Special Requests: --        LEFT  HAND       Culture result: NO GROWTH 5 DAYS       BLOOD CULTURE [[id number]] Collected: 11/30/21 1313    Order Status: Completed Specimen: Blood Updated: 12/05/21 0717     Special Requests: RIGHT ANTECUBITAL        Culture result: NO GROWTH 5 DAYS       CULTURE, URINE [[id number]] Collected: 11/30/21 1333    Order Status: Completed Specimen: Cath Urine Updated: 12/02/21 0831     Special Requests: NO SPECIAL REQUESTS        Culture result: NO GROWTH 2 DAYS       COVID-19 RAPID TEST [[id number]] Collected: 11/30/21 1400    Order Status: Completed Specimen: Nasopharyngeal Updated: 11/30/21 1419     Specimen source Nasopharyngeal        COVID-19 rapid test Not detected        Comment:      The specimen is NEGATIVE for SARS-CoV-2, the novel coronavirus associated with COVID-19. A negative result does not rule out COVID-19. This test has been authorized by the FDA under an Emergency Use Authorization (EUA) for use by authorized laboratories. Fact sheet for Healthcare Providers: [employer].co.nz  Fact sheet for Patients: [employer].co.nz       Methodology: Isothermal Nucleic Acid Amplification               Other Studies:  No results found.     Current Meds:  Current Facility-Administered Medications   Medication Dose Route Frequency    atorvastatin (LIPITOR) tablet 80 mg  80 mg Oral QHS    famotidine (PEPCID) tablet 40 mg  40 mg Oral DAILY    sertraline (ZOLOFT) tablet 150 mg  150 mg Oral DAILY    cefepime (MAXIPIME) 2 g in 0.9% sodium chloride (MBP/ADV) 100 mL MBP  2 g IntraVENous Q12H    carvediloL (COREG) tablet 12.5 mg  12.5 mg Oral BID WITH MEALS    sodium chloride (NS) flush 5-10 mL  5-10 mL IntraVENous Q8H    sodium chloride (NS) flush 5-10 mL  5-10 mL IntraVENous PRN    exemestane (AROMASIN) tablet 25 mg (Patient Supplied)  25 mg Oral DAILY    levothyroxine (SYNTHROID) tablet 150 mcg  150 mcg Oral ACB    sodium chloride (NS) flush 5-40 mL  5-40 mL IntraVENous Q8H    sodium chloride (NS) flush 5-40 mL  5-40 mL IntraVENous PRN    acetaminophen (TYLENOL) tablet 650 mg  650 mg Oral Q6H PRN    Or    acetaminophen (TYLENOL) suppository 650 mg  650 mg Rectal Q6H PRN    senna (SENOKOT) tablet 17.2 mg  2 Tablet Oral BID PRN    ondansetron (ZOFRAN) injection 4 mg  4 mg IntraVENous Q6H PRN    cloNIDine HCL (CATAPRES) tablet 0.2 mg  0.2 mg Oral BID PRN    hydrALAZINE (APRESOLINE) 20 mg/mL injection 10 mg  10 mg IntraVENous Q6H PRN    albuterol-ipratropium (DUO-NEB) 2.5 MG-0.5 MG/3 ML  3 mL Nebulization Q4H PRN       Signed:  Spencer Rich MD    Part of this note may have been written by using a voice dictation software. The note has been proof read but may still contain some grammatical/other typographical errors.

## 2021-12-05 NOTE — PROGRESS NOTES
Problem: Falls - Risk of  Goal: *Absence of Falls  Description: Document Leafy Guzman Fall Risk and appropriate interventions in the flowsheet.   Outcome: Progressing Towards Goal  Note: Fall Risk Interventions:  Mobility Interventions: Bed/chair exit alarm    Mentation Interventions: Bed/chair exit alarm    Medication Interventions: Bed/chair exit alarm    Elimination Interventions: Call light in reach    History of Falls Interventions: Bed/chair exit alarm

## 2021-12-06 VITALS
HEART RATE: 69 BPM | DIASTOLIC BLOOD PRESSURE: 83 MMHG | TEMPERATURE: 98.8 F | WEIGHT: 170.4 LBS | BODY MASS INDEX: 24.39 KG/M2 | RESPIRATION RATE: 18 BRPM | OXYGEN SATURATION: 97 % | SYSTOLIC BLOOD PRESSURE: 156 MMHG | HEIGHT: 70 IN

## 2021-12-06 PROCEDURE — 74011000258 HC RX REV CODE- 258: Performed by: INTERNAL MEDICINE

## 2021-12-06 PROCEDURE — 74011250637 HC RX REV CODE- 250/637: Performed by: HOSPITALIST

## 2021-12-06 PROCEDURE — 74011250636 HC RX REV CODE- 250/636: Performed by: INTERNAL MEDICINE

## 2021-12-06 PROCEDURE — 74011250637 HC RX REV CODE- 250/637: Performed by: INTERNAL MEDICINE

## 2021-12-06 RX ORDER — LEVOTHYROXINE SODIUM 150 UG/1
150 TABLET ORAL
Qty: 30 TABLET | Refills: 0 | Status: SHIPPED | OUTPATIENT
Start: 2021-12-06 | End: 2022-01-05

## 2021-12-06 RX ORDER — ATORVASTATIN CALCIUM 80 MG/1
80 TABLET, FILM COATED ORAL DAILY
Qty: 30 TABLET | Refills: 0 | Status: SHIPPED | OUTPATIENT
Start: 2021-12-06 | End: 2022-01-05

## 2021-12-06 RX ADMIN — Medication 10 ML: at 13:06

## 2021-12-06 RX ADMIN — Medication 10 ML: at 06:26

## 2021-12-06 RX ADMIN — LEVOTHYROXINE SODIUM 150 MCG: 0.15 TABLET ORAL at 06:26

## 2021-12-06 RX ADMIN — CARVEDILOL 12.5 MG: 12.5 TABLET, FILM COATED ORAL at 17:36

## 2021-12-06 RX ADMIN — CEFEPIME HYDROCHLORIDE 2 G: 2 INJECTION, POWDER, FOR SOLUTION INTRAVENOUS at 08:45

## 2021-12-06 RX ADMIN — SERTRALINE 150 MG: 100 TABLET, FILM COATED ORAL at 08:45

## 2021-12-06 RX ADMIN — CARVEDILOL 12.5 MG: 12.5 TABLET, FILM COATED ORAL at 08:46

## 2021-12-06 RX ADMIN — FAMOTIDINE 40 MG: 20 TABLET ORAL at 08:46

## 2021-12-06 RX ADMIN — Medication 10 ML: at 13:05

## 2021-12-06 NOTE — PROGRESS NOTES
Pt to d/c home today. Her son will provide transportation home when he leaves work. Interim HH: RN, PT, OT, CNA ordered. Rolling walker ordered, and given to pt prior to d/c, through [address] had a lengthy discussion with pt's son regarding home health services and coping with losing family members as well as caring for pt. CM provided active listening and support. No other supportive care needs identified. Milestones met. Care Management Interventions  PCP Verified by CM: Yes (Reta Gonzalez NP)  Mode of Transport at Discharge: Other (see comment) (Family)  Transition of Care Consult (CM Consult): Discharge Planning, [address]: No  Reason Outside [place]: Patient already serviced by other home care/hospice agency  MyChart Signup:  ([facility])  Discharge Durable Medical Equipment: Yes  Physical Therapy Consult: Yes  Occupational Therapy Consult: Yes  Speech Therapy Consult: No  Support Systems: Child(annamaria) (Son)  Confirm Follow Up Transport: Family  The Plan for Transition of Care is Related to the Following Treatment Goals : Pt requires home health to return to functional baseline in the community.   The Patient and/or Patient Representative was Provided with a Choice of Provider and Agrees with the Discharge Plan?: Yes  Name of the Patient Representative Who was Provided with a Choice of Provider and Agrees with the Discharge Plan: Rocco Stearns and Clemente Malave (son)  Freedom of Choice List was Provided with Basic Dialogue that Supports the Patient's Individualized Plan of Care/Goals, Treatment Preferences and Shares the Quality Data Associated with the Providers?: Yes   Resource Information Provided?: No  Discharge Location  Discharge Placement: Home with home health

## 2021-12-06 NOTE — DISCHARGE SUMMARY
Hospitalist Discharge Summary   Admit Date:  2021  1:06 PM   DC Note date: 2021  Name:  Francesco Sy   Age:  77 y.o. Sex:  female  :  1955   MRN:  [id number]   Room:  [facility]  PCP:  Zora Morales NP    Presenting Complaint: Blood infection    Initial Admission Diagnosis: CAP (community acquired pneumonia) [J18.9]     Problem List for this Hospitalization:  Hospital Problems as of 2021 Date Reviewed: 2/15/2021          Codes Class Noted - Resolved POA    * (Principal) CAP (community acquired pneumonia) ICD-10-CM: J18.9  ICD-9-CM: 486  2021 - Present Unknown        Essential hypertension ICD-10-CM: I10  ICD-9-CM: 401.9  2017 - Present Yes        Acquired hypothyroidism ICD-10-CM: E03.9  ICD-9-CM: 244.9  10/5/2016 - Present Yes        Mixed hyperlipidemia ICD-10-CM: E78.2  ICD-9-CM: 272.2  10/5/2016 - Present Yes            Did Patient have Sepsis (YES OR NO): No     Hospital Course:    Patient with past medical history of  Hypothyroidism  CABG  Atrial fibrillation  PUD  GI bleeding  Hypertension  Prediabetes  Breast cancer  Patient presented to hospital with generalized weakness with change in mental status, according to the son.      Patient was found to have infiltration in the lung field. She is diagnosed as community-acquired bacterial pneumonia. She is started on empiric antibiotic. You initially was started on azithromycin and Rocephin. She continued to have fever. Rocephin was changed to cefepime. Fever then subsided. Blood culture came back negative. Patient continues to improve. No fever. No chills. No coughing. Patient is eating well. Patient is deemed stable for discharge. I have discussed with son on the day of discharge. He agrees to the current plan. Patient will go home. Son lives with her. Patient will follow up with her primary doctor.        Disposition: Home or Self Care  Diet: ADULT DIET Regular; 4 carb choices (60 gm/meal)  Code Status: Full Code    Follow Up Orders: Follow-up Appointments   Procedures    FOLLOW UP VISIT Appointment in: 3 - 5 Days See your primary doctor. See your primary doctor. Standing Status:   Standing     Number of Occurrences:   1     Order Specific Question:   Appointment in     Answer:   3 - 5 Days       Follow-up Information     Follow up With Specialties Details Why Contact Info    Storm Lion NP Family Medicine, Nurse Practitioner   Ruslan Winn [address] [place] [address]  [phone]            Follow up labs/diagnostics (ultimately defer to outpatient provider):  Primary doctor    Time spent in patient discharge and coordination 34 minutes. Plan was discussed with patient and son. All questions answered. Patient was stable at time of discharge. Instructions given to call a physician or return if any concerns. Discharge Info:   Current Discharge Medication List      CONTINUE these medications which have CHANGED    Details   atorvastatin (LIPITOR) 80 mg tablet Take 1 Tablet by mouth daily for 30 doses. TAKE 1 TABLET BY MOUTH EVERY NIGHT  Qty: 30 Tablet, Refills: 0  Start date: 12/6/2021, End date: 1/5/2022    Comments: **Patient requests 90 days supply**      levothyroxine (SYNTHROID) 150 mcg tablet Take 1 Tablet by mouth Daily (before breakfast) for 30 days. Qty: 30 Tablet, Refills: 0  Start date: 12/6/2021, End date: 1/5/2022         CONTINUE these medications which have NOT CHANGED    Details   famotidine (PEPCID) 40 mg tablet Take 1 Tab by mouth daily. Qty: 90 Tab, Refills: 0      felodipine (PLENDIL SR) 10 mg 24 hr tablet Take 1 Tab by mouth daily.   Qty: 90 Tab, Refills: 0      lisinopril-hydroCHLOROthiazide (PRINZIDE, ZESTORETIC) 20-12.5 mg per tablet TAKE 1 TABLET BY MOUTH TWICE DAILY  Qty: 180 Tab, Refills: 0    Comments: **Patient requests 90 days supply**      carvediloL (COREG) 12.5 mg tablet TAKE 1 TABLET BY MOUTH EVERY MORNING  Qty: 90 Tab, Refills: 0 Comments: **Patient requests 90 days supply**      sertraline (ZOLOFT) 100 mg tablet TAKE 1 AND 1/2 TABLETS BY MOUTH EVERY DAY  Qty: 135 Tab, Refills: 0      ezetimibe (ZETIA) 10 mg tablet TAKE 1 TABLET BY MOUTH DAILY  Qty: 90 Tab, Refills: 3    Comments: **Patient requests 90 days supply**      exemestane (AROMASIN) 25 mg tablet 25 mg every morning. Indications: prevention of breast cancer in high risk women  Refills: 3      MV, MIN #36/IRON,CARBONYL/FA (GERITOL COMPLETE PO) Take 1 Tab by mouth every other day. Pt states also has liquid form. cholecalciferol, vitamin D3, (VITAMIN D3) 4,000 unit cap Take 1 Cap by mouth every other day. STOP taking these medications       potassium chloride SA (MICRO-K) 10 mEq capsule Comments:   Reason for Stopping:               Procedures done this admission:  * No surgery found *    Consults this admission:  None    Echocardiogram/EKG results:  No results found for this or any previous visit. EKG Results     Procedure 720 Value Units Date/Time    EKG, 12 LEAD, INITIAL [[id number]]     Order Status: Completed           Diagnostic Imaging/Tests:   CT HEAD WO CONT    Result Date: 11/30/2021  CT head without contrast History: pt c/o general weakness and AMS per son, baseline Aand O x 4, GLF yesterday. Technique: 5mm axial images were obtained from the skull base to the vertex without contrast. Radiation dose reduction techniques were used for this study: Our CT scanners use one or all of the following: Automated exposure control, adjustment of the mA and/or kVp according to patient's size, iterative reconstruction. Comparison: None Findings: The ventricles and sulci are appropriate for age. There are no extra-axial fluid collections. No evidence of acute intraparenchymal hemorrhage or mass effect is identified. Patchy areas of decreased attenuation are noted within the supratentorial white matter.  These are nonspecific findings but would be most compatible with mild chronic small vessel ischemic changes. There is no evidence to suggest an acute major territorial infarct. The bony calvarium is intact. The visualized mastoid air cells and paranasal sinuses are well pneumatized and aerated. 1. Findings most compatible with mild chronic small vessel ischemic changes. 2. Otherwise unremarkable unenhanced CT scan of the brain. XR CHEST PORT    Result Date: 11/30/2021  Portable chest: History: Pt family states generalized weakness and AMS Comparison: 02/09/2019 Findings: A single view of the chest was obtained at 1334 hours. Left subclavian Mediport catheter is unchanged. The cardiac silhouette is normal in size and configuration. There is mild patchy right basilar airspace opacity. There are no pleural effusions. The pulmonary vascularity is within normal limits. Sternotomy wires are present. Surgical clips overlie the right axilla. Mild right basilar airspace opacity worrisome for pneumonia in the proper clinical setting.        All Micro Results     Procedure Component Value Units Date/Time    CULTURE, BLOOD [[id number]] Collected: 12/03/21 1045    Order Status: Completed Specimen: Blood Updated: 12/06/21 0653     Special Requests: --        RIGHT  HAND       Culture result: NO GROWTH 3 DAYS       BLOOD CULTURE [[id number]] Collected: 11/30/21 1438    Order Status: Completed Specimen: Blood Updated: 12/05/21 0717     Special Requests: --        LEFT  HAND       Culture result: NO GROWTH 5 DAYS       BLOOD CULTURE [[id number]] Collected: 11/30/21 1313    Order Status: Completed Specimen: Blood Updated: 12/05/21 0717     Special Requests: RIGHT ANTECUBITAL        Culture result: NO GROWTH 5 DAYS       CULTURE, URINE [[id number]] Collected: 11/30/21 1333    Order Status: Completed Specimen: Cath Urine Updated: 12/02/21 0831     Special Requests: NO SPECIAL REQUESTS        Culture result: NO GROWTH 2 DAYS       COVID-19 RAPID TEST [[id number]] Collected: 11/30/21 1400    Order Status: Completed Specimen: Nasopharyngeal Updated: 11/30/21 1419     Specimen source Nasopharyngeal        COVID-19 rapid test Not detected        Comment:      The specimen is NEGATIVE for SARS-CoV-2, the novel coronavirus associated with COVID-19. A negative result does not rule out COVID-19. This test has been authorized by the FDA under an Emergency Use Authorization (EUA) for use by authorized laboratories. Fact sheet for Healthcare Providers: Playdate Appdate.co.nz  Fact sheet for Patients: Playdate AppdaWantr.co.nz       Methodology: Isothermal Nucleic Acid Amplification               Labs: Results:       BMP, Mg, Phos No results for input(s): NA, K, CL, CO2, AGAP, BUN, CREA, CA, GLU, MG, PHOS in the last 72 hours. CBC No results for input(s): WBC, RBC, HGB, HCT, PLT, GRANS, LYMPH, EOS, MONOS, BASOS, IG, ANEU, ABL, PURNIMA, ABM, ABB, AIG, HGBEXT, HCTEXT, PLTEXT in the last 72 hours. LFT No results for input(s): ALT, TBIL, AP, TP, ALB, GLOB, AGRAT in the last 72 hours.     No lab exists for component: SGOT, GPT   Cardiac Testing Lab Results   Component Value Date/Time    BNP 61 (H) 02/09/2019 04:38 PM      Coagulation Tests Lab Results   Component Value Date/Time    Prothrombin time 12.6 (H) 11/06/2015 09:10 PM    INR 1.2 11/06/2015 09:10 PM      A1c Lab Results   Component Value Date/Time    Hemoglobin A1c 5.7 (H) 02/16/2021 10:06 AM    Hemoglobin A1c 5.8 (H) 02/07/2018 10:09 AM    Hemoglobin A1c 5.9 (H) 07/13/2017 08:50 AM      Lipid Panel Lab Results   Component Value Date/Time    Cholesterol, total 171 02/16/2021 10:06 AM    HDL Cholesterol 38 (L) 02/16/2021 10:06 AM    LDL, calculated 113 (H) 02/16/2021 10:06 AM    LDL, calculated 91 03/05/2020 12:36 PM    VLDL, calculated 20 02/16/2021 10:06 AM    VLDL, calculated 22 03/05/2020 12:36 PM    Triglyceride 112 02/16/2021 10:06 AM      Thyroid Panel Lab Results   Component Value Date/Time    TSH 69.700 (H) 11/30/2021 01:24 PM    TSH 0.012 (L) 02/16/2021 10:06 AM        Most Recent UA No results found for: COLOR, APPRN, REFSG, BANDAR, PROTU, GLUCU, KETU, BILU, BLDU, UROU, JARRETT, LEUKU, WBCU, RBCU, UEPI, BACTU, CASTS, UCRY, MUCUS, UCOM       All Labs from Last 24 Hrs:  No results found for this or any previous visit (from the past 24 hour(s)).     Current Med List in Hospital:   Current Facility-Administered Medications   Medication Dose Route Frequency    atorvastatin (LIPITOR) tablet 80 mg  80 mg Oral QHS    famotidine (PEPCID) tablet 40 mg  40 mg Oral DAILY    sertraline (ZOLOFT) tablet 150 mg  150 mg Oral DAILY    cefepime (MAXIPIME) 2 g in 0.9% sodium chloride (MBP/ADV) 100 mL MBP  2 g IntraVENous Q12H    carvediloL (COREG) tablet 12.5 mg  12.5 mg Oral BID WITH MEALS    sodium chloride (NS) flush 5-10 mL  5-10 mL IntraVENous Q8H    sodium chloride (NS) flush 5-10 mL  5-10 mL IntraVENous PRN    exemestane (AROMASIN) tablet 25 mg (Patient Supplied)  25 mg Oral DAILY    levothyroxine (SYNTHROID) tablet 150 mcg  150 mcg Oral ACB    sodium chloride (NS) flush 5-40 mL  5-40 mL IntraVENous Q8H    sodium chloride (NS) flush 5-40 mL  5-40 mL IntraVENous PRN    acetaminophen (TYLENOL) tablet 650 mg  650 mg Oral Q6H PRN    Or    acetaminophen (TYLENOL) suppository 650 mg  650 mg Rectal Q6H PRN    senna (SENOKOT) tablet 17.2 mg  2 Tablet Oral BID PRN    ondansetron (ZOFRAN) injection 4 mg  4 mg IntraVENous Q6H PRN    cloNIDine HCL (CATAPRES) tablet 0.2 mg  0.2 mg Oral BID PRN    hydrALAZINE (APRESOLINE) 20 mg/mL injection 10 mg  10 mg IntraVENous Q6H PRN    albuterol-ipratropium (DUO-NEB) 2.5 MG-0.5 MG/3 ML  3 mL Nebulization Q4H PRN       Allergies   Allergen Reactions    Codeine Rash     Immunization History   Administered Date(s) Administered    Influenza High Dose Vaccine PF 10/09/2019    Influenza Vaccine 10/05/2016    Influenza Vaccine (Quad) PF (>6 Mo Flulaval, Fluarix, and >3 Yrs Afluria, Fluzone 18425) 11/15/2018    Pneumococcal Conjugate (PCV-13) 08/07/2018    TB Skin Test (PPD) Intradermal 11/30/2021    Zoster Recombinant 12/26/2018       Recent Vital Data:  Patient Vitals for the past 24 hrs:   Temp Pulse Resp BP SpO2   12/06/21 0903 98.6 °F (37 °C) -- -- -- --   12/06/21 0846 -- 94 17 (!) 145/61 97 %   12/06/21 0433 98.7 °F (37.1 °C) 65 18 (!) 170/86 99 %   12/05/21 2344 -- 68 18 (!) 153/88 100 %   12/05/21 2002 98.1 °F (36.7 °C) 66 18 (!) 157/84 100 %   12/05/21 1803 97.9 °F (36.6 °C) 67 18 (!) 151/93 94 %   12/05/21 1134 98.8 °F (37.1 °C) 68 18 104/77 97 %     Oxygen Therapy  O2 Sat (%): 97 % (12/06/21 0846)  Pulse via Oximetry: 74 beats per minute (12/03/21 2039)  O2 Device: None (Room air) (12/04/21 1930)  Skin Assessment: Clean, dry, & intact (12/03/21 1930)  Skin Protection for O2 Device: No (12/03/21 1930)  O2 Flow Rate (L/min): 40 l/min (12/03/21 1930)  FIO2 (%): 45 % (12/03/21 1930)    Estimated body mass index is 24.45 kg/m² as calculated from the following:    Height as of this encounter: 5' 10\" (1.778 m). Weight as of this encounter: 77.3 kg (170 lb 6.4 oz). Intake/Output Summary (Last 24 hours) at 12/6/2021 0929  Last data filed at 12/6/2021 0433  Gross per 24 hour   Intake --   Output 1200 ml   Net -1200 ml         Physical Exam:    Visit Vitals  BP (!) 145/61   Pulse 94   Temp 98.6 °F (37 °C)   Resp 17   Ht 5' 10\" (1.778 m)   Wt 77.3 kg (170 lb 6.4 oz)   SpO2 97%   BMI 24.45 kg/m²      General:          Well nourished. No overt distress, patient is sitting up in bed. She is eating breakfast.   head:               Normocephalic, atraumatic  Eyes:               Sclerae appear normal.  Pupils equally round. ENT:                Nares appear normal, no drainage. Moist oral mucosa  Neck:               No restricted ROM. Trachea midline   CV:                  RRR. No m/r/g. No jugular venous distension. Lungs:             CTAB. No wheezing, rhonchi, or rales.   Respirations even, unlabored  Abdomen: Bowel sounds present. Soft, nontender, nondistended. Extremities:     No cyanosis or clubbing. No edema  Skin:                No rashes and normal coloration. Warm and dry. Neuro:             CN II-XII grossly intact. Sensation intact. A&Ox3  Psych:             Normal mood and affect.           Signed:  Keyona Curry MD    Part of this note may have been written by using a voice dictation software. The note has been proof read but may still contain some grammatical/other typographical errors.

## 2021-12-06 NOTE — PROGRESS NOTES
Problem: Falls - Risk of  Goal: *Absence of Falls  Description: Document Uday Grant Fall Risk and appropriate interventions in the flowsheet.   Outcome: Progressing Towards Goal  Note: Fall Risk Interventions:  Mobility Interventions: Bed/chair exit alarm    Mentation Interventions: Bed/chair exit alarm, Door open when patient unattended    Medication Interventions: Bed/chair exit alarm    Elimination Interventions: Call light in reach, Bed/chair exit alarm    History of Falls Interventions: Bed/chair exit alarm, Door open when patient unattended

## 2021-12-08 ENCOUNTER — APPOINTMENT (OUTPATIENT)
Dept: CT IMAGING | Age: 66
DRG: 193 | End: 2021-12-08
Attending: EMERGENCY MEDICINE
Payer: MEDICARE

## 2021-12-08 ENCOUNTER — HOSPITAL ENCOUNTER (INPATIENT)
Age: 66
LOS: 7 days | Discharge: NURSING, REHAB OR LONG-TERM CARE | DRG: 193 | End: 2021-12-16
Attending: EMERGENCY MEDICINE | Admitting: EMERGENCY MEDICINE
Payer: MEDICARE

## 2021-12-08 ENCOUNTER — APPOINTMENT (OUTPATIENT)
Dept: GENERAL RADIOLOGY | Age: 66
DRG: 193 | End: 2021-12-08
Attending: EMERGENCY MEDICINE
Payer: MEDICARE

## 2021-12-08 DIAGNOSIS — R41.82 ALTERED MENTAL STATUS, UNSPECIFIED ALTERED MENTAL STATUS TYPE: ICD-10-CM

## 2021-12-08 DIAGNOSIS — J18.9 PNEUMONIA OF RIGHT LOWER LOBE DUE TO INFECTIOUS ORGANISM: Primary | ICD-10-CM

## 2021-12-08 LAB
ALBUMIN SERPL-MCNC: 3.1 G/DL (ref 3.2–4.6)
ALBUMIN/GLOB SERPL: 0.5 {RATIO} (ref 1.2–3.5)
ALP SERPL-CCNC: 69 U/L (ref 50–136)
ALT SERPL-CCNC: 59 U/L (ref 12–65)
ANION GAP SERPL CALC-SCNC: 6 MMOL/L (ref 7–16)
AST SERPL-CCNC: 74 U/L (ref 15–37)
BACTERIA SPEC CULT: NORMAL
BASOPHILS # BLD: 0.1 K/UL (ref 0–0.2)
BASOPHILS NFR BLD: 1 % (ref 0–2)
BILIRUB SERPL-MCNC: 0.6 MG/DL (ref 0.2–1.1)
BUN SERPL-MCNC: 18 MG/DL (ref 8–23)
CALCIUM SERPL-MCNC: 9.8 MG/DL (ref 8.3–10.4)
CHLORIDE SERPL-SCNC: 107 MMOL/L (ref 98–107)
CO2 SERPL-SCNC: 26 MMOL/L (ref 21–32)
CREAT SERPL-MCNC: 1.36 MG/DL (ref 0.6–1)
DIFFERENTIAL METHOD BLD: ABNORMAL
EOSINOPHIL # BLD: 0 K/UL (ref 0–0.8)
EOSINOPHIL NFR BLD: 1 % (ref 0.5–7.8)
ERYTHROCYTE [DISTWIDTH] IN BLOOD BY AUTOMATED COUNT: 13.5 % (ref 11.9–14.6)
GLOBULIN SER CALC-MCNC: 6.3 G/DL (ref 2.3–3.5)
GLUCOSE SERPL-MCNC: 109 MG/DL (ref 65–100)
HCT VFR BLD AUTO: 30.5 % (ref 35.8–46.3)
HGB BLD-MCNC: 10.1 G/DL (ref 11.7–15.4)
IMM GRANULOCYTES # BLD AUTO: 0 K/UL (ref 0–0.5)
IMM GRANULOCYTES NFR BLD AUTO: 1 % (ref 0–5)
LYMPHOCYTES # BLD: 1.1 K/UL (ref 0.5–4.6)
LYMPHOCYTES NFR BLD: 25 % (ref 13–44)
MCH RBC QN AUTO: 33.9 PG (ref 26.1–32.9)
MCHC RBC AUTO-ENTMCNC: 33.1 G/DL (ref 31.4–35)
MCV RBC AUTO: 102.3 FL (ref 79.6–97.8)
MONOCYTES # BLD: 0.3 K/UL (ref 0.1–1.3)
MONOCYTES NFR BLD: 6 % (ref 4–12)
NEUTS SEG # BLD: 3 K/UL (ref 1.7–8.2)
NEUTS SEG NFR BLD: 66 % (ref 43–78)
NRBC # BLD: 0 K/UL (ref 0–0.2)
PLATELET # BLD AUTO: 280 K/UL (ref 150–450)
PMV BLD AUTO: 9.5 FL (ref 9.4–12.3)
POTASSIUM SERPL-SCNC: 5.1 MMOL/L (ref 3.5–5.1)
PROT SERPL-MCNC: 9.4 G/DL (ref 6.3–8.2)
RBC # BLD AUTO: 2.98 M/UL (ref 4.05–5.2)
SERVICE CMNT-IMP: NORMAL
SODIUM SERPL-SCNC: 139 MMOL/L (ref 136–145)
WBC # BLD AUTO: 4.6 K/UL (ref 4.3–11.1)

## 2021-12-08 PROCEDURE — 72125 CT NECK SPINE W/O DYE: CPT

## 2021-12-08 PROCEDURE — 93005 ELECTROCARDIOGRAM TRACING: CPT | Performed by: EMERGENCY MEDICINE

## 2021-12-08 PROCEDURE — 85025 COMPLETE CBC W/AUTO DIFF WBC: CPT

## 2021-12-08 PROCEDURE — 51701 INSERT BLADDER CATHETER: CPT

## 2021-12-08 PROCEDURE — 99285 EMERGENCY DEPT VISIT HI MDM: CPT

## 2021-12-08 PROCEDURE — 94762 N-INVAS EAR/PLS OXIMTRY CONT: CPT

## 2021-12-08 PROCEDURE — 70450 CT HEAD/BRAIN W/O DYE: CPT

## 2021-12-08 PROCEDURE — 80053 COMPREHEN METABOLIC PANEL: CPT

## 2021-12-08 PROCEDURE — 71045 X-RAY EXAM CHEST 1 VIEW: CPT

## 2021-12-08 RX ORDER — SODIUM CHLORIDE 0.9 % (FLUSH) 0.9 %
5-10 SYRINGE (ML) INJECTION EVERY 8 HOURS
Status: DISCONTINUED | OUTPATIENT
Start: 2021-12-08 | End: 2021-12-16 | Stop reason: HOSPADM

## 2021-12-08 RX ORDER — SODIUM CHLORIDE 0.9 % (FLUSH) 0.9 %
5-10 SYRINGE (ML) INJECTION AS NEEDED
Status: DISCONTINUED | OUTPATIENT
Start: 2021-12-08 | End: 2021-12-16 | Stop reason: HOSPADM

## 2021-12-08 NOTE — ED TRIAGE NOTES
Pt arrives via [facility] in [place]. Complaints of fall in bathroom and \"hit face on wall\" Neck pain 8/10, denies LOC or dizziness. States UTI diagnosed last Tuesday, not currently taking antibiotics. GCS 15. A&Ox3, disoriented to location.

## 2021-12-09 PROBLEM — R41.0 DELIRIUM: Status: ACTIVE | Noted: 2021-12-09

## 2021-12-09 LAB
ANION GAP SERPL CALC-SCNC: 8 MMOL/L (ref 7–16)
APPEARANCE UR: CLEAR
ATRIAL RATE: 67 BPM
BACTERIA URNS QL MICRO: 0 /HPF
BASOPHILS # BLD: 0 K/UL (ref 0–0.2)
BASOPHILS NFR BLD: 1 % (ref 0–2)
BILIRUB UR QL: NEGATIVE
BUN SERPL-MCNC: 17 MG/DL (ref 8–23)
CALCIUM SERPL-MCNC: 9.2 MG/DL (ref 8.3–10.4)
CALCULATED P AXIS, ECG09: 67 DEGREES
CALCULATED R AXIS, ECG10: 76 DEGREES
CALCULATED T AXIS, ECG11: 75 DEGREES
CASTS URNS QL MICRO: ABNORMAL /LPF
CHLORIDE SERPL-SCNC: 108 MMOL/L (ref 98–107)
CO2 SERPL-SCNC: 24 MMOL/L (ref 21–32)
COLOR UR: YELLOW
COVID-19 RAPID TEST, COVR: NOT DETECTED
CREAT SERPL-MCNC: 1.06 MG/DL (ref 0.6–1)
DIAGNOSIS, 93000: NORMAL
DIFFERENTIAL METHOD BLD: ABNORMAL
EOSINOPHIL # BLD: 0 K/UL (ref 0–0.8)
EOSINOPHIL NFR BLD: 1 % (ref 0.5–7.8)
EPI CELLS #/AREA URNS HPF: ABNORMAL /HPF
ERYTHROCYTE [DISTWIDTH] IN BLOOD BY AUTOMATED COUNT: 12.7 % (ref 11.9–14.6)
GLUCOSE SERPL-MCNC: 82 MG/DL (ref 65–100)
GLUCOSE UR STRIP.AUTO-MCNC: NEGATIVE MG/DL
HCT VFR BLD AUTO: 26.6 % (ref 35.8–46.3)
HGB BLD-MCNC: 8.7 G/DL (ref 11.7–15.4)
HGB UR QL STRIP: NEGATIVE
IMM GRANULOCYTES # BLD AUTO: 0 K/UL (ref 0–0.5)
IMM GRANULOCYTES NFR BLD AUTO: 0 % (ref 0–5)
KETONES UR QL STRIP.AUTO: NEGATIVE MG/DL
LACTATE SERPL-SCNC: 0.9 MMOL/L (ref 0.4–2)
LEUKOCYTE ESTERASE UR QL STRIP.AUTO: NEGATIVE
LYMPHOCYTES # BLD: 1.2 K/UL (ref 0.5–4.6)
LYMPHOCYTES NFR BLD: 28 % (ref 13–44)
MCH RBC QN AUTO: 34.4 PG (ref 26.1–32.9)
MCHC RBC AUTO-ENTMCNC: 32.7 G/DL (ref 31.4–35)
MCV RBC AUTO: 105.1 FL (ref 79.6–97.8)
MONOCYTES # BLD: 0.4 K/UL (ref 0.1–1.3)
MONOCYTES NFR BLD: 9 % (ref 4–12)
NEUTS SEG # BLD: 2.8 K/UL (ref 1.7–8.2)
NEUTS SEG NFR BLD: 62 % (ref 43–78)
NITRITE UR QL STRIP.AUTO: NEGATIVE
NRBC # BLD: 0 K/UL (ref 0–0.2)
P-R INTERVAL, ECG05: 158 MS
PH UR STRIP: 5.5 [PH] (ref 5–9)
PLATELET # BLD AUTO: 228 K/UL (ref 150–450)
PMV BLD AUTO: 9.1 FL (ref 9.4–12.3)
POTASSIUM SERPL-SCNC: 3.3 MMOL/L (ref 3.5–5.1)
PROCALCITONIN SERPL-MCNC: <0.05 NG/ML (ref 0–0.49)
PROT UR STRIP-MCNC: ABNORMAL MG/DL
Q-T INTERVAL, ECG07: 440 MS
QRS DURATION, ECG06: 82 MS
QTC CALCULATION (BEZET), ECG08: 464 MS
RBC # BLD AUTO: 2.53 M/UL (ref 4.05–5.2)
RBC #/AREA URNS HPF: ABNORMAL /HPF
SODIUM SERPL-SCNC: 140 MMOL/L (ref 136–145)
SOURCE, COVRS: NORMAL
SP GR UR REFRACTOMETRY: 1.02 (ref 1–1.02)
UROBILINOGEN UR QL STRIP.AUTO: 0.2 EU/DL (ref 0.2–1)
VENTRICULAR RATE, ECG03: 67 BPM
WBC # BLD AUTO: 4.5 K/UL (ref 4.3–11.1)
WBC URNS QL MICRO: ABNORMAL /HPF

## 2021-12-09 PROCEDURE — 96366 THER/PROPH/DIAG IV INF ADDON: CPT

## 2021-12-09 PROCEDURE — 87040 BLOOD CULTURE FOR BACTERIA: CPT

## 2021-12-09 PROCEDURE — 87635 SARS-COV-2 COVID-19 AMP PRB: CPT

## 2021-12-09 PROCEDURE — 65270000029 HC RM PRIVATE

## 2021-12-09 PROCEDURE — 84145 PROCALCITONIN (PCT): CPT

## 2021-12-09 PROCEDURE — 80048 BASIC METABOLIC PNL TOTAL CA: CPT

## 2021-12-09 PROCEDURE — 83605 ASSAY OF LACTIC ACID: CPT

## 2021-12-09 PROCEDURE — 96365 THER/PROPH/DIAG IV INF INIT: CPT

## 2021-12-09 PROCEDURE — 81003 URINALYSIS AUTO W/O SCOPE: CPT

## 2021-12-09 PROCEDURE — 36415 COLL VENOUS BLD VENIPUNCTURE: CPT

## 2021-12-09 PROCEDURE — 96375 TX/PRO/DX INJ NEW DRUG ADDON: CPT

## 2021-12-09 PROCEDURE — 85025 COMPLETE CBC W/AUTO DIFF WBC: CPT

## 2021-12-09 PROCEDURE — 74011000258 HC RX REV CODE- 258: Performed by: EMERGENCY MEDICINE

## 2021-12-09 PROCEDURE — 74011250636 HC RX REV CODE- 250/636: Performed by: EMERGENCY MEDICINE

## 2021-12-09 PROCEDURE — 74011250637 HC RX REV CODE- 250/637: Performed by: INTERNAL MEDICINE

## 2021-12-09 PROCEDURE — 74011250637 HC RX REV CODE- 250/637: Performed by: EMERGENCY MEDICINE

## 2021-12-09 PROCEDURE — 74011250636 HC RX REV CODE- 250/636: Performed by: INTERNAL MEDICINE

## 2021-12-09 RX ORDER — CARVEDILOL 3.12 MG/1
12.5 TABLET ORAL DAILY
Status: DISCONTINUED | OUTPATIENT
Start: 2021-12-09 | End: 2021-12-16 | Stop reason: HOSPADM

## 2021-12-09 RX ORDER — VANCOMYCIN/0.9 % SOD CHLORIDE 1.5G/250ML
1500 PLASTIC BAG, INJECTION (ML) INTRAVENOUS ONCE
Status: COMPLETED | OUTPATIENT
Start: 2021-12-09 | End: 2021-12-09

## 2021-12-09 RX ORDER — AMLODIPINE BESYLATE 5 MG/1
5 TABLET ORAL DAILY
Status: DISCONTINUED | OUTPATIENT
Start: 2021-12-09 | End: 2021-12-15

## 2021-12-09 RX ORDER — LISINOPRIL AND HYDROCHLOROTHIAZIDE 12.5; 2 MG/1; MG/1
1 TABLET ORAL 2 TIMES DAILY
Status: DISCONTINUED | OUTPATIENT
Start: 2021-12-09 | End: 2021-12-16 | Stop reason: HOSPADM

## 2021-12-09 RX ORDER — FAMOTIDINE 20 MG/1
40 TABLET, FILM COATED ORAL DAILY
Status: DISCONTINUED | OUTPATIENT
Start: 2021-12-09 | End: 2021-12-16 | Stop reason: HOSPADM

## 2021-12-09 RX ORDER — ENOXAPARIN SODIUM 100 MG/ML
40 INJECTION SUBCUTANEOUS EVERY 24 HOURS
Status: DISCONTINUED | OUTPATIENT
Start: 2021-12-09 | End: 2021-12-16 | Stop reason: HOSPADM

## 2021-12-09 RX ORDER — LORAZEPAM 2 MG/ML
1 INJECTION INTRAMUSCULAR
Status: DISCONTINUED | OUTPATIENT
Start: 2021-12-09 | End: 2021-12-16 | Stop reason: HOSPADM

## 2021-12-09 RX ORDER — ATORVASTATIN CALCIUM 20 MG/1
80 TABLET, FILM COATED ORAL DAILY
Status: DISCONTINUED | OUTPATIENT
Start: 2021-12-09 | End: 2021-12-16 | Stop reason: HOSPADM

## 2021-12-09 RX ORDER — LEVOTHYROXINE SODIUM 50 UG/1
150 TABLET ORAL
Status: DISCONTINUED | OUTPATIENT
Start: 2021-12-09 | End: 2021-12-16 | Stop reason: HOSPADM

## 2021-12-09 RX ORDER — EZETIMIBE 10 MG/1
10 TABLET ORAL DAILY
Status: DISCONTINUED | OUTPATIENT
Start: 2021-12-09 | End: 2021-12-16 | Stop reason: HOSPADM

## 2021-12-09 RX ORDER — SODIUM CHLORIDE 0.9 % (FLUSH) 0.9 %
5-40 SYRINGE (ML) INJECTION AS NEEDED
Status: DISCONTINUED | OUTPATIENT
Start: 2021-12-09 | End: 2021-12-16 | Stop reason: HOSPADM

## 2021-12-09 RX ORDER — MELATONIN
4000 DAILY
Status: DISCONTINUED | OUTPATIENT
Start: 2021-12-09 | End: 2021-12-16 | Stop reason: HOSPADM

## 2021-12-09 RX ORDER — AZITHROMYCIN 250 MG/1
500 TABLET, FILM COATED ORAL DAILY
Status: COMPLETED | OUTPATIENT
Start: 2021-12-09 | End: 2021-12-13

## 2021-12-09 RX ORDER — SODIUM CHLORIDE 0.9 % (FLUSH) 0.9 %
5-40 SYRINGE (ML) INJECTION EVERY 8 HOURS
Status: DISCONTINUED | OUTPATIENT
Start: 2021-12-09 | End: 2021-12-16 | Stop reason: HOSPADM

## 2021-12-09 RX ADMIN — CARVEDILOL 12.5 MG: 3.12 TABLET, FILM COATED ORAL at 09:28

## 2021-12-09 RX ADMIN — FAMOTIDINE 40 MG: 20 TABLET ORAL at 09:27

## 2021-12-09 RX ADMIN — Medication 10 ML: at 06:00

## 2021-12-09 RX ADMIN — Medication 10 ML: at 22:00

## 2021-12-09 RX ADMIN — VITAMIN D, TAB 1000IU (100/BT) 4000 UNITS: 25 TAB at 09:27

## 2021-12-09 RX ADMIN — PIPERACILLIN SODIUM AND TAZOBACTAM SODIUM 4.5 G: 4; .5 INJECTION, POWDER, LYOPHILIZED, FOR SOLUTION INTRAVENOUS at 02:14

## 2021-12-09 RX ADMIN — AZITHROMYCIN MONOHYDRATE 500 MG: 250 TABLET ORAL at 10:16

## 2021-12-09 RX ADMIN — LISINOPRIL AND HYDROCHLOROTHIAZIDE 1 TABLET: 12.5; 2 TABLET ORAL at 10:16

## 2021-12-09 RX ADMIN — LEVOTHYROXINE SODIUM 150 MCG: 0.05 TABLET ORAL at 09:28

## 2021-12-09 RX ADMIN — SODIUM CHLORIDE 1000 ML: 900 INJECTION, SOLUTION INTRAVENOUS at 08:00

## 2021-12-09 RX ADMIN — Medication 5 ML: at 08:00

## 2021-12-09 RX ADMIN — LORAZEPAM 1 MG: 2 INJECTION INTRAMUSCULAR; INTRAVENOUS at 16:48

## 2021-12-09 RX ADMIN — EZETIMIBE 10 MG: 10 TABLET ORAL at 10:16

## 2021-12-09 RX ADMIN — VANCOMYCIN HYDROCHLORIDE 1500 MG: 10 INJECTION, POWDER, LYOPHILIZED, FOR SOLUTION INTRAVENOUS at 04:05

## 2021-12-09 RX ADMIN — AMLODIPINE BESYLATE 5 MG: 10 TABLET ORAL at 09:27

## 2021-12-09 RX ADMIN — ENOXAPARIN SODIUM 40 MG: 100 INJECTION SUBCUTANEOUS at 09:28

## 2021-12-09 RX ADMIN — Medication 5 ML: at 14:00

## 2021-12-09 RX ADMIN — ATORVASTATIN CALCIUM 80 MG: 40 TABLET, FILM COATED ORAL at 09:28

## 2021-12-09 RX ADMIN — CEFTRIAXONE 1 G: 1 INJECTION, POWDER, FOR SOLUTION INTRAMUSCULAR; INTRAVENOUS at 10:12

## 2021-12-09 RX ADMIN — SERTRALINE 150 MG: 100 TABLET, FILM COATED ORAL at 10:16

## 2021-12-09 NOTE — ED PROVIDER NOTES
Patient's son reported she had fallen at home this evening and has had increased confusion over the last week. There are some history that she was recently seen in the emergency department and diagnosed with a UTI but not prescribed antibiotics. Patient is confused and is a poor historian. When she fell she struck her face on the wall but had no loss of consciousness. Labs a CT head and neck and x-ray ordered from triage.            Past Medical History:   Diagnosis Date    Anxiety disorder     takes zoloft prn    Atrial fibrillation (HCC)     pt states not aware of this dx; will obtain cardiac records    Breast cancer ([facility] [address] 02/09/2016    Rt Mastectomy    CAD (coronary artery disease) 2006    triple bypass; pt is followed by Dr. Amarilys Johnson [facility]) dx 1997    right breast cancer; chemotherapy     Depression     managed w/med    Dyspepsia and other specified disorders of function of stomach     pt states symptoms are better 2/3/16    Environmental allergies     takes allegra prn    GERD (gastroesophageal reflux disease)     takes zantac prn    Headache     pt states not consistent; happens prn    Hx of iron deficiency anemia     had blood transfusion 11/15    Hypercholesterolemia     pt not taking medication    Hypertension     managed w/med    Prediabetes     Primary hyperparathyroidism ([facility] [address]     s/p parathyroidectomy; takes medication daily    PUD (peptic ulcer disease)     Patient denies    Stool color black     pt states due to previous chemo; pt states no current issues    Thyroid disease     s/p thyroidectomy; takes medication daily       Past Surgical History:   Procedure Laterality Date    HX BREAST LUMPECTOMY Right 1997    breast cancer     HX CARPAL TUNNEL RELEASE Right     HX COLONOSCOPY  2005    HX HYSTERECTOMY      HX MASTECTOMY Right 2/9/2016    BREAST MASTECTOMY SIMPLE RIGHT  performed by Nessa Suazo MD at [address] [place] [address] HX PARATHYROIDECTOMY  2007    HX THYROIDECTOMY  2007    HX TUBAL LIGATION      HX VASCULAR ACCESS  6/15    left chest wall life port for chemo    NC CABG, ARTERY-VEIN, THREE  2006    cardiac stent placed prior to sx         Family History:   Problem Relation Age of Onset    Heart Disease Mother     Hypertension Mother     Cancer Father         colon    Breast Cancer Neg Hx        Social History     Socioeconomic History    Marital status:      Spouse name: Not on file    Number of children: Not on file    Years of education: Not on file    Highest education level: Not on file   Occupational History    Not on file   Tobacco Use    Smoking status: Never Smoker    Smokeless tobacco: Never Used   Substance and Sexual Activity    Alcohol use: No     Alcohol/week: 0.0 standard drinks    Drug use: No    Sexual activity: Not on file   Other Topics Concern    Not on file   Social History Narrative    Not on file     Social Determinants of Health     Financial Resource Strain:     Difficulty of Paying Living Expenses: Not on file   Food Insecurity:     Worried About Running Out of Food in the Last Year: Not on file    Luis of Food in the Last Year: Not on file   Transportation Needs:     Lack of Transportation (Medical): Not on file    Lack of Transportation (Non-Medical):  Not on file   Physical Activity:     Days of Exercise per Week: Not on file    Minutes of Exercise per Session: Not on file   Stress:     Feeling of Stress : Not on file   Social Connections:     Frequency of Communication with Friends and Family: Not on file    Frequency of Social Gatherings with Friends and Family: Not on file    Attends Confucianist Services: Not on file    Active Member of Clubs or Organizations: Not on file    Attends Club or Organization Meetings: Not on file    Marital Status: Not on file   Intimate Partner Violence:     Fear of Current or Ex-Partner: Not on file    Emotionally Abused: Not on file    Physically Abused: Not on file    Sexually Abused: Not on file   Housing Stability:     Unable to Pay for Housing in the Last Year: Not on file    Number of Places Lived in the Last Year: Not on file    Unstable Housing in the Last Year: Not on file         ALLERGIES: Codeine    Review of Systems   Unable to perform ROS: Mental status change       Vitals:    12/08/21 1804 12/08/21 2100 12/09/21 0145 12/09/21 0200   BP: 125/66 (!) 155/83 (!) 148/73 (!) 146/76   Pulse: 63 72     Resp: 18      Temp: 98.4 °F (36.9 °C)      SpO2: 99% 98% 97% 98%   Weight:       Height:                Physical Exam  Vitals and nursing note reviewed. Constitutional:       Appearance: She is well-developed. She is not ill-appearing or toxic-appearing. HENT:      Head: Normocephalic. Right Ear: External ear normal.      Left Ear: External ear normal.      Nose: Nose normal.      Mouth/Throat:      Mouth: Mucous membranes are moist.   Eyes:      Pupils: Pupils are equal, round, and reactive to light. Cardiovascular:      Rate and Rhythm: Normal rate and regular rhythm. Heart sounds: Normal heart sounds. Pulmonary:      Effort: Pulmonary effort is normal.      Breath sounds: Rales ( Right lower lobe Rales) present. Abdominal:      General: There is no distension. Palpations: Abdomen is soft. There is no mass. Tenderness: There is no abdominal tenderness. There is no guarding or rebound. Musculoskeletal:         General: Normal range of motion. Right lower leg: No edema. Left lower leg: No edema. Lymphadenopathy:      Cervical: No cervical adenopathy. Skin:     General: Skin is warm and dry. Neurological:      Mental Status: She is alert. She is disoriented. Cranial Nerves: No cranial nerve deficit. Sensory: No sensory deficit. Motor: No weakness.       Coordination: Coordination normal.      Comments: Oriented x1 but not to specific place and not to time, gives many confused answers and has long pauses between answers          MDM  Number of Diagnoses or Management Options  Diagnosis management comments: Prolonged extensive work-up in the emergency department reveals a worsening right lower lobe pneumonia for which she was admitted on November 30. Urine does not appear infected on a straight cath urine. Lactic acid is normal.  No hypoxia. CT scan imaging is negative for acute pathology and CT scan of the neck showed no fracture. Patient is very confused and has worsening pneumonia radiographically. Cultures were obtained and broad-spectrum antibiotics ordered. Hospitalist consulted for admission.        Amount and/or Complexity of Data Reviewed  Clinical lab tests: ordered and reviewed (Results for orders placed or performed during the hospital encounter of 12/08/21  -COVID-19 RAPID TEST:        Result                      Value             Ref Range           Specimen source             NASAL                                 COVID-19 rapid test         Not detected      NOTD           -CBC WITH AUTOMATED DIFF:        Result                      Value             Ref Range           WBC                         4.6               4.3 - 11.1 K*       RBC                         2.98 (L)          4.05 - 5.2 M*       HGB                         10.1 (L)          11.7 - 15.4 *       HCT                         30.5 (L)          35.8 - 46.3 %       MCV                         102.3 (H)         79.6 - 97.8 *       MCH                         33.9 (H)          26.1 - 32.9 *       MCHC                        33.1              31.4 - 35.0 *       RDW                         13.5              11.9 - 14.6 %       PLATELET                    280               150 - 450 K/*       MPV                         9.5               9.4 - 12.3 FL       ABSOLUTE NRBC               0.00              0.0 - 0.2 K/*       DF                          AUTOMATED                             NEUTROPHILS                 66 43 - 78 %           LYMPHOCYTES                 25                13 - 44 %           MONOCYTES                   6                 4.0 - 12.0 %        EOSINOPHILS                 1                 0.5 - 7.8 %         BASOPHILS                   1                 0.0 - 2.0 %         IMMATURE GRANULOCYTES       1                 0.0 - 5.0 %         ABS. NEUTROPHILS            3.0               1.7 - 8.2 K/*       ABS. LYMPHOCYTES            1.1               0.5 - 4.6 K/*       ABS. MONOCYTES              0.3               0.1 - 1.3 K/*       ABS. EOSINOPHILS            0.0               0.0 - 0.8 K/*       ABS. BASOPHILS              0.1               0.0 - 0.2 K/*       ABS. IMM. GRANS.            0.0               0.0 - 0.5 K/*  -METABOLIC PANEL, COMPREHENSIVE:        Result                      Value             Ref Range           Sodium                      139               136 - 145 mm*       Potassium                   5.1               3.5 - 5.1 mm*       Chloride                    107               98 - 107 mmo*       CO2                         26                21 - 32 mmol*       Anion gap                   6 (L)             7 - 16 mmol/L       Glucose                     109 (H)           65 - 100 mg/*       BUN                         18                8 - 23 MG/DL        Creatinine                  1.36 (H)          0.6 - 1.0 MG*       GFR est AA                  50 (L)            >60 ml/min/1*       GFR est non-AA              41 (L)            >60 ml/min/1*       Calcium                     9.8               8.3 - 10.4 M*       Bilirubin, total            0.6               0.2 - 1.1 MG*       ALT (SGPT)                  59                12 - 65 U/L         AST (SGOT)                  74 (H)            15 - 37 U/L         Alk.  phosphatase            69                50 - 136 U/L        Protein, total              9.4 (H)           6.3 - 8.2 g/*       Albumin                     3.1 (L) 3.2 - 4.6 g/*       Globulin                    6.3 (H)           2.3 - 3.5 g/*       A-G Ratio                   0.5 (L)           1.2 - 3.5      -LACTIC ACID:        Result                      Value             Ref Range           Lactic acid                 0.9               0.4 - 2.0 MM*  -URINALYSIS W/ RFLX MICROSCOPIC:        Result                      Value             Ref Range           Color                       YELLOW                                Appearance                  CLEAR                                 Specific gravity            1.021             1.001 - 1.02*       pH (UA)                     5.5               5.0 - 9.0           Protein                     TRACE (A)         NEG mg/dL           Glucose                     Negative          mg/dL               Ketone                      Negative          NEG mg/dL           Bilirubin                   Negative          NEG                 Blood                       Negative          NEG                 Urobilinogen                0.2               0.2 - 1.0 EU*       Nitrites                    Negative          NEG                 Leukocyte Esterase          Negative          NEG                 WBC                         0-3               0 /hpf              RBC                         0-3               0 /hpf              Epithelial cells            0-3               0 /hpf              Bacteria                    0                 0 /hpf              Casts                       10-20             0 /lpf         -EKG, 12 LEAD, INITIAL:        Result                      Value             Ref Range           Ventricular Rate            67                BPM                 Atrial Rate                 67                BPM                 P-R Interval                158               ms                  QRS Duration                82                ms                  Q-T Interval                440               ms                  QTC Calculation (Bezet)     464               ms                  Calculated P Axis           67                degrees             Calculated R Axis           76                degrees             Calculated T Axis           75                degrees             Diagnosis                                                     Sinus rhythm with occasional Premature ventricular complexes   Possible Anterior infarct , age undetermined   Abnormal ECG   When compared with ECG of 06-NOV-2015 21:10,   Premature ventricular complexes are now Present   Borderline criteria for Anterior infarct are now Present     )  Tests in the radiology section of CPT®: ordered and reviewed (CT HEAD WO CONT    Result Date: 12/8/2021  CT head and cervical spine without contrast HISTORY: Complaints of fall in bathroom and \"hit face on wall\" Neck pain 8/10, denies LOC or dizziness. States UTI diagnosed last Tuesday, not currently taking antibiotics. GCS 15. AandOx3, disoriented to location TECHNIQUE: 5 mm axial images were obtained from the skull base to the vertex without intravenous contrast. Helically acquired images were obtained spine reconstructed at 2.5 mm thickness. Sagittal and coronal reformatted images were submitted. All CT scans at this facility are performed using dose optimization technique as appropriate to a performed exam, to include on automated exposure control, adjustment of the mA and/or KV according to patient's size (including appropriate matching for site-specific examinations), or use of iterative reconstruction technique. COMPARISON: Head CT 11/30/2021. CT head without contrast: Findings: The ventricles and sulci are mildly prominent. There are no extra-axial fluid collections. No evidence of acute intracranial hemorrhage or mass effect is identified. There is no evidence to suggest an acute major territorial infarct. Patchy areas of decreased attenuation are present within the supratentorial white matter.  These are nonspecific findings but would be most compatible with mild  chronic small vessel ischemic change. The bony calvarium is intact. The visualized mastoid air cells and paranasal sinuses are well pneumatized and aerated. CT cervical spine without contrast: Findings: The vertebral body height and alignment are well maintained. There is mild disc space narrowing at C5-6. Small endplate spurs are present at C4-5, C5-6 and C6-7. There is no evidence of acute fracture or subluxation. The prevertebral soft tissues are unremarkable. Carotid atherosclerotic changes are present. 1. No evidence of acute intracranial hemorrhage. 2. No evidence of acute cervical spine fracture. 3. Cervical spondylosis. 4. Mild chronic small vessel ischemic change. CT SPINE CERV WO CONT    Result Date: 12/8/2021  CT head and cervical spine without contrast HISTORY: Complaints of fall in bathroom and \"hit face on wall\" Neck pain 8/10, denies LOC or dizziness. States UTI diagnosed last Tuesday, not currently taking antibiotics. GCS 15. AandOx3, disoriented to location TECHNIQUE: 5 mm axial images were obtained from the skull base to the vertex without intravenous contrast. Helically acquired images were obtained spine reconstructed at 2.5 mm thickness. Sagittal and coronal reformatted images were submitted. All CT scans at this facility are performed using dose optimization technique as appropriate to a performed exam, to include on automated exposure control, adjustment of the mA and/or KV according to patient's size (including appropriate matching for site-specific examinations), or use of iterative reconstruction technique. COMPARISON: Head CT 11/30/2021. CT head without contrast: Findings: The ventricles and sulci are mildly prominent. There are no extra-axial fluid collections. No evidence of acute intracranial hemorrhage or mass effect is identified. There is no evidence to suggest an acute major territorial infarct.  Patchy areas of decreased attenuation are present within the supratentorial white matter. These are nonspecific findings but would be most compatible with mild  chronic small vessel ischemic change. The bony calvarium is intact. The visualized mastoid air cells and paranasal sinuses are well pneumatized and aerated. CT cervical spine without contrast: Findings: The vertebral body height and alignment are well maintained. There is mild disc space narrowing at C5-6. Small endplate spurs are present at C4-5, C5-6 and C6-7. There is no evidence of acute fracture or subluxation. The prevertebral soft tissues are unremarkable. Carotid atherosclerotic changes are present. 1. No evidence of acute intracranial hemorrhage. 2. No evidence of acute cervical spine fracture. 3. Cervical spondylosis. 4. Mild chronic small vessel ischemic change. XR CHEST PORT    Result Date: 12/8/2021  Chest portable CLINICAL INDICATION: Acute severe weakness with syncope, fall injury COMPARISON: 11/30/2021 TECHNIQUE: single AP portable view chest at 5:41 PM upright FINDINGS: Slightly increased mild groundglass and reticular opacity at the right base. Stable mediastinal and hilar contours with cardiac enlargement, CABG again evident. Stable left portacatheter with tip projecting over SVC. No pneumothorax. Right axillary surgical clips again noted. Bone density appears borderline low. Patient is rotated.  Lung volumes are low normal.     Mild right basilar infiltrate.     )  Review and summarize past medical records: yes  Discuss the patient with other providers: yes    Risk of Complications, Morbidity, and/or Mortality  Presenting problems: moderate  Diagnostic procedures: low  Management options: moderate    Patient Progress  Patient progress: stable         Procedures

## 2021-12-09 NOTE — H&P
History and Physical          Hospitalist History and Physical   Admit Date:  2021  4:54 PM   Name:  Kan Fountain   Age:  77 y.o. Sex:  female  :  1955   MRN:  [id number]   Room:  ER    Presenting Complaint: Fall    Reason(s) for Admission: No admission diagnoses are documented for this encounter. History of Present Illness:   Kan Fountain is a 77 y.o. female with medical history of hypertension, hyperlipidemia, hypothyroidism, who presented with complaint of new onset confusion that had worsened over the last several days. She is noted to be relatively disoriented in the emergency department. She was also noted to have a worsening right base infiltrate compared with a chest x-ray from 2021. She requires admission for worsening right lower lobe pneumonia with associated confusion. Unable to give significant answers to review of systems due to her being disoriented and unable to answer historical questions accurately. Review of Systems:  10 systems reviewed and negative except as noted in HPI. Assessment & Plan:     Principal Problem:  CAP (community acquired pneumonia)  Worsening right base infiltrates. Rocephin and azithromycin. Likely cause of delirium. Will monitor closely and adjust treatment as necessary. Delirium  Likely secondary to pneumonia and possibly also to some dehydration. Pneumonia being treated with antibiotics. Does not appear hypoxic. Lactic acid and procalcitonin is unremarkable. Covid is negative. IV fluids. Will monitor closely and adjust treatment as necessary. Essential hypertension  Continue carvedilol, lisinopril-hydrochlorothiazide, felodipine. Hold for hypotension. Will monitor closely and adjust treatment as necessary. Mixed hyperlipidemia  Continue atorvastatin, ezetimibe. Acquired hypothyroidism  Continue levothyroxine. Significantly elevated TSH 1 week ago.   Hypothyroidism could be contributing to confusion as well although not likely.         Dispo/Discharge Planning:   3-4 midnights    Diet: Heart-healthy diet  VTE ppx: Lovenox  Code status: [address] Problems as of 12/9/2021 Date Reviewed: 12/9/2021          Codes Class Noted - Resolved POA    * (Principal) CAP (community acquired pneumonia) ICD-10-CM: J18.9  ICD-9-CM: 486  11/30/2021 - Present Yes        Delirium ICD-10-CM: R41.0  ICD-9-CM: 780.09  12/9/2021 - Present Yes        Essential hypertension ICD-10-CM: I10  ICD-9-CM: 401.9  1/4/2017 - Present Yes        Mixed hyperlipidemia ICD-10-CM: E78.2  ICD-9-CM: 272.2  10/5/2016 - Present Yes        Acquired hypothyroidism ICD-10-CM: E03.9  ICD-9-CM: 244.9  10/5/2016 - Present Yes              Past History:  Past Medical History:   Diagnosis Date    Anxiety disorder     takes zoloft prn    Atrial fibrillation ([facility] [address]     pt states not aware of this dx; will obtain cardiac records    Breast cancer ([facility] [address] 02/09/2016    Rt Mastectomy    CAD (coronary artery disease) 2006    triple bypass; pt is followed by Dr. Vergara Sylvester [facility]) dx 1997    right breast cancer; chemotherapy     Depression     managed w/med    Dyspepsia and other specified disorders of function of stomach     pt states symptoms are better 2/3/16    Environmental allergies     takes allegra prn    GERD (gastroesophageal reflux disease)     takes zantac prn    Headache     pt states not consistent; happens prn    Hx of iron deficiency anemia     had blood transfusion 11/15    Hypercholesterolemia     pt not taking medication    Hypertension     managed w/med    Prediabetes     Primary hyperparathyroidism ([facility] [address]     s/p parathyroidectomy; takes medication daily    PUD (peptic ulcer disease)     Patient denies    Stool color black     pt states due to previous chemo; pt states no current issues    Thyroid disease     s/p thyroidectomy; takes medication daily     Past Surgical History:   Procedure Laterality Date    HX BREAST LUMPECTOMY Right 1997    breast cancer     HX CARPAL TUNNEL RELEASE Right     HX COLONOSCOPY  2005    HX HYSTERECTOMY      HX MASTECTOMY Right 2/9/2016    BREAST MASTECTOMY SIMPLE RIGHT  performed [facility] Gilson [facility] [address] HX PARATHYROIDECTOMY  2007    HX THYROIDECTOMY  2007    HX TUBAL LIGATION      HX VASCULAR ACCESS  6/15    left chest wall life port for chemo    FL CABG, ARTERY-VEIN, THREE  2006    cardiac stent placed prior to sx      Allergies   Allergen Reactions    Codeine Rash      Social History     Tobacco Use    Smoking status: Never Smoker    Smokeless tobacco: Never Used   Substance Use Topics    Alcohol use: No     Alcohol/week: 0.0 standard drinks      Family History   Problem Relation Age of Onset    Heart Disease Mother     Hypertension Mother     Cancer Father         colon    Breast Cancer Neg Hx       Family history reviewed and negative except as otherwise noted.     Immunization History   Administered Date(s) Administered    Influenza High Dose Vaccine PF 10/09/2019    Influenza Vaccine 10/05/2016    Influenza Vaccine (Quad) PF (>6 Mo Flulaval, Fluarix, and >3 Yrs Afluria, Fluzone 10005) 11/15/2018    Pneumococcal Conjugate (PCV-13) 08/07/2018    TB Skin Test (PPD) Intradermal 11/30/2021    Zoster Recombinant 12/26/2018     Prior to Admit Medications:  Current Outpatient Medications   Medication Instructions    atorvastatin (LIPITOR) 80 mg, Oral, DAILY, TAKE 1 TABLET BY MOUTH EVERY NIGHT    carvediloL (COREG) 12.5 mg tablet TAKE 1 TABLET BY MOUTH EVERY MORNING    cholecalciferol, vitamin D3, (VITAMIN D3) 4,000 unit cap 1 Capsule, Oral, EVERY OTHER DAY    exemestane (AROMASIN) 25 mg, 7AM    ezetimibe (ZETIA) 10 mg tablet TAKE 1 TABLET BY MOUTH DAILY    famotidine (PEPCID) 40 mg, Oral, DAILY    felodipine (PLENDIL SR) 10 mg, Oral, DAILY    levothyroxine (SYNTHROID) 150 mcg, Oral, DAILY BEFORE BREAKFAST    lisinopril-hydroCHLOROthiazide (PRINZIDE, ZESTORETIC) 20-12.5 mg per tablet TAKE 1 TABLET BY MOUTH TWICE DAILY    MV, MIN #36/IRON,CARBONYL/FA (GERITOL COMPLETE PO) 1 Tablet, Oral, EVERY OTHER DAY, Pt states also has liquid form.  sertraline (ZOLOFT) 100 mg tablet TAKE 1 AND 1/2 TABLETS BY MOUTH EVERY DAY       Objective:     Patient Vitals for the past 24 hrs:   Temp Pulse Resp BP SpO2   12/09/21 0414 -- 70 12 (!) 151/77 --   12/09/21 0314 -- -- -- 135/77 97 %   12/09/21 0200 -- -- -- (!) 146/76 98 %   12/09/21 0145 -- -- -- (!) 148/73 97 %   12/08/21 2100 -- 72 -- (!) 155/83 98 %   12/08/21 1804 98.4 °F (36.9 °C) 63 18 125/66 99 %   12/08/21 1700 98.2 °F (36.8 °C) 67 18 (!) 88/75 99 %     Oxygen Therapy  O2 Sat (%): 97 % (12/09/21 0314)  Pulse via Oximetry: 65 beats per minute (12/09/21 0314)  O2 Device: None (Room air) (12/09/21 0414)    Estimated body mass index is 24.39 kg/m² as calculated from the following:    Height as of this encounter: 5' 10\" (1.778 m). Weight as of this encounter: 77.1 kg (170 lb). No intake or output data in the 24 hours ending 12/09/21 0641      Physical Exam:    Blood pressure (!) 151/77, pulse 70, temperature 98.4 °F (36.9 °C), resp. rate 12, height 5' 10\" (1.778 m), weight 77.1 kg (170 lb), SpO2 97 %. General:    Well nourished. No overt distress. Sitting up on stretcher no acute distress. Appears comfortable. Nontoxic-appearing. Not acutely ill appearing. Alert and oriented to self. Not oriented to place, purpose, time. Head:  Normocephalic, atraumatic  Eyes:  Sclerae appear normal.  Pupils equally round. ENT:  Nares appear normal, no drainage. Moist oral mucosa  Neck:  No restricted ROM. Trachea midline   CV:   RRR. No m/r/g. No jugular venous distension. Lungs:   CTAB. No wheezing or rales. Rhonchorous breath sounds in the right base. Respirations even, unlabored  Abdomen: Bowel sounds present. Soft, nontender, nondistended. No rebound or guarding. Extremities: No cyanosis or clubbing.   No edema  Skin:     No rashes and normal coloration. Warm and dry. Neuro:  CN II-XII grossly intact. Sensation intact. A&Ox3  Psych:  Normal mood and affect. I have reviewed ordered lab tests and independently visualized imaging below:    Last 24hr Labs:  Recent Results (from the past 24 hour(s))   CBC WITH AUTOMATED DIFF    Collection Time: 12/08/21  5:11 PM   Result Value Ref Range    WBC 4.6 4.3 - 11.1 K/uL    RBC 2.98 (L) 4.05 - 5.2 M/uL    HGB 10.1 (L) 11.7 - 15.4 g/dL    HCT 30.5 (L) 35.8 - 46.3 %    .3 (H) 79.6 - 97.8 FL    MCH 33.9 (H) 26.1 - 32.9 PG    MCHC 33.1 31.4 - 35.0 g/dL    RDW 13.5 11.9 - 14.6 %    PLATELET 004 971 - 253 K/uL    MPV 9.5 9.4 - 12.3 FL    ABSOLUTE NRBC 0.00 0.0 - 0.2 K/uL    DF AUTOMATED      NEUTROPHILS 66 43 - 78 %    LYMPHOCYTES 25 13 - 44 %    MONOCYTES 6 4.0 - 12.0 %    EOSINOPHILS 1 0.5 - 7.8 %    BASOPHILS 1 0.0 - 2.0 %    IMMATURE GRANULOCYTES 1 0.0 - 5.0 %    ABS. NEUTROPHILS 3.0 1.7 - 8.2 K/UL    ABS. LYMPHOCYTES 1.1 0.5 - 4.6 K/UL    ABS. MONOCYTES 0.3 0.1 - 1.3 K/UL    ABS. EOSINOPHILS 0.0 0.0 - 0.8 K/UL    ABS. BASOPHILS 0.1 0.0 - 0.2 K/UL    ABS. IMM. GRANS. 0.0 0.0 - 0.5 K/UL   METABOLIC PANEL, COMPREHENSIVE    Collection Time: 12/08/21  5:11 PM   Result Value Ref Range    Sodium 139 136 - 145 mmol/L    Potassium 5.1 3.5 - 5.1 mmol/L    Chloride 107 98 - 107 mmol/L    CO2 26 21 - 32 mmol/L    Anion gap 6 (L) 7 - 16 mmol/L    Glucose 109 (H) 65 - 100 mg/dL    BUN 18 8 - 23 MG/DL    Creatinine 1.36 (H) 0.6 - 1.0 MG/DL    GFR est AA 50 (L) >60 ml/min/1.73m2    GFR est non-AA 41 (L) >60 ml/min/1.73m2    Calcium 9.8 8.3 - 10.4 MG/DL    Bilirubin, total 0.6 0.2 - 1.1 MG/DL    ALT (SGPT) 59 12 - 65 U/L    AST (SGOT) 74 (H) 15 - 37 U/L    Alk.  phosphatase 69 50 - 136 U/L    Protein, total 9.4 (H) 6.3 - 8.2 g/dL    Albumin 3.1 (L) 3.2 - 4.6 g/dL    Globulin 6.3 (H) 2.3 - 3.5 g/dL    A-G Ratio 0.5 (L) 1.2 - 3.5     EKG, 12 LEAD, INITIAL    Collection Time: 12/08/21  5:13 PM   Result Value Ref Range    Ventricular Rate 67 BPM    Atrial Rate 67 BPM    P-R Interval 158 ms    QRS Duration 82 ms    Q-T Interval 440 ms    QTC Calculation (Bezet) 464 ms    Calculated P Axis 67 degrees    Calculated R Axis 76 degrees    Calculated T Axis 75 degrees    Diagnosis       Sinus rhythm with occasional Premature ventricular complexes  Possible Anterior infarct , age undetermined  Abnormal ECG  When compared with ECG of 06-NOV-2015 21:10,  Premature ventricular complexes are now Present  Borderline criteria for Anterior infarct are now Present     LACTIC ACID    Collection Time: 12/09/21  1:36 AM   Result Value Ref Range    Lactic acid 0.9 0.4 - 2.0 MMOL/L   URINALYSIS W/ RFLX MICROSCOPIC    Collection Time: 12/09/21  1:36 AM   Result Value Ref Range    Color YELLOW      Appearance CLEAR      Specific gravity 1.021 1.001 - 1.023      pH (UA) 5.5 5.0 - 9.0      Protein TRACE (A) NEG mg/dL    Glucose Negative mg/dL    Ketone Negative NEG mg/dL    Bilirubin Negative NEG      Blood Negative NEG      Urobilinogen 0.2 0.2 - 1.0 EU/dL    Nitrites Negative NEG      Leukocyte Esterase Negative NEG      WBC 0-3 0 /hpf    RBC 0-3 0 /hpf    Epithelial cells 0-3 0 /hpf    Bacteria 0 0 /hpf    Casts 10-20 0 /lpf   COVID-19 RAPID TEST    Collection Time: 12/09/21  1:36 AM   Result Value Ref Range    Specimen source NASAL      COVID-19 rapid test Not detected NOTD     PROCALCITONIN    Collection Time: 12/09/21  1:36 AM   Result Value Ref Range    Procalcitonin <0.05 0.00 - 0.49 ng/mL   CULTURE, BLOOD    Collection Time: 12/09/21  4:26 AM    Specimen: Blood   Result Value Ref Range    Special Requests: RIGHT  ARM        Culture result: PENDING        All Micro Results     Procedure Component Value Units Date/Time    CULTURE, BLOOD [[id number]] Collected: 12/09/21 0426    Order Status: Completed Specimen: Blood Updated: 12/09/21 0525     Special Requests: --        RIGHT  ARM       Culture result: PENDING    CULTURE, BLOOD [[id number]] Collected: 12/09/21 0136    Order Status: Completed Specimen: Blood Updated: 12/09/21 0339    COVID-19 RAPID TEST [[id number]] Collected: 12/09/21 0136    Order Status: Completed Specimen: Nasopharyngeal Updated: 12/09/21 0223     Specimen source NASAL        COVID-19 rapid test Not detected        Comment:      The specimen is NEGATIVE for SARS-CoV-2, the novel coronavirus associated with COVID-19. A negative result does not rule out COVID-19. This test has been authorized by the FDA under an Emergency Use Authorization (EUA) for use by authorized laboratories. Fact sheet for Healthcare Providers: kooaba.co.nz  Fact sheet for Patients: kooaba[employer]co.nz       Methodology: Isothermal Nucleic Acid Amplification               Other Studies:  CT HEAD WO CONT    Result Date: 12/8/2021  CT head and cervical spine without contrast HISTORY: Complaints of fall in bathroom and \"hit face on wall\" Neck pain 8/10, denies LOC or dizziness. States UTI diagnosed last Tuesday, not currently taking antibiotics. GCS 15. AandOx3, disoriented to location TECHNIQUE: 5 mm axial images were obtained from the skull base to the vertex without intravenous contrast. Helically acquired images were obtained spine reconstructed at 2.5 mm thickness. Sagittal and coronal reformatted images were submitted. All CT scans at this facility are performed using dose optimization technique as appropriate to a performed exam, to include on automated exposure control, adjustment of the mA and/or KV according to patient's size (including appropriate matching for site-specific examinations), or use of iterative reconstruction technique. COMPARISON: Head CT 11/30/2021. CT head without contrast: Findings: The ventricles and sulci are mildly prominent. There are no extra-axial fluid collections.  No evidence of acute intracranial hemorrhage or mass effect is identified. There is no evidence to suggest an acute major territorial infarct. Patchy areas of decreased attenuation are present within the supratentorial white matter. These are nonspecific findings but would be most compatible with mild  chronic small vessel ischemic change. The bony calvarium is intact. The visualized mastoid air cells and paranasal sinuses are well pneumatized and aerated. CT cervical spine without contrast: Findings: The vertebral body height and alignment are well maintained. There is mild disc space narrowing at C5-6. Small endplate spurs are present at C4-5, C5-6 and C6-7. There is no evidence of acute fracture or subluxation. The prevertebral soft tissues are unremarkable. Carotid atherosclerotic changes are present. 1. No evidence of acute intracranial hemorrhage. 2. No evidence of acute cervical spine fracture. 3. Cervical spondylosis. 4. Mild chronic small vessel ischemic change. CT SPINE CERV WO CONT    Result Date: 12/8/2021  CT head and cervical spine without contrast HISTORY: Complaints of fall in bathroom and \"hit face on wall\" Neck pain 8/10, denies LOC or dizziness. States UTI diagnosed last Tuesday, not currently taking antibiotics. GCS 15. AandOx3, disoriented to location TECHNIQUE: 5 mm axial images were obtained from the skull base to the vertex without intravenous contrast. Helically acquired images were obtained spine reconstructed at 2.5 mm thickness. Sagittal and coronal reformatted images were submitted. All CT scans at this facility are performed using dose optimization technique as appropriate to a performed exam, to include on automated exposure control, adjustment of the mA and/or KV according to patient's size (including appropriate matching for site-specific examinations), or use of iterative reconstruction technique. COMPARISON: Head CT 11/30/2021. CT head without contrast: Findings: The ventricles and sulci are mildly prominent.  There are no extra-axial fluid collections. No evidence of acute intracranial hemorrhage or mass effect is identified. There is no evidence to suggest an acute major territorial infarct. Patchy areas of decreased attenuation are present within the supratentorial white matter. These are nonspecific findings but would be most compatible with mild  chronic small vessel ischemic change. The bony calvarium is intact. The visualized mastoid air cells and paranasal sinuses are well pneumatized and aerated. CT cervical spine without contrast: Findings: The vertebral body height and alignment are well maintained. There is mild disc space narrowing at C5-6. Small endplate spurs are present at C4-5, C5-6 and C6-7. There is no evidence of acute fracture or subluxation. The prevertebral soft tissues are unremarkable. Carotid atherosclerotic changes are present. 1. No evidence of acute intracranial hemorrhage. 2. No evidence of acute cervical spine fracture. 3. Cervical spondylosis. 4. Mild chronic small vessel ischemic change. XR CHEST PORT    Result Date: 12/8/2021  Chest portable CLINICAL INDICATION: Acute severe weakness with syncope, fall injury COMPARISON: 11/30/2021 TECHNIQUE: single AP portable view chest at 5:41 PM upright FINDINGS: Slightly increased mild groundglass and reticular opacity at the right base. Stable mediastinal and hilar contours with cardiac enlargement, CABG again evident. Stable left portacatheter with tip projecting over SVC. No pneumothorax. Right axillary surgical clips again noted. Bone density appears borderline low. Patient is rotated. Lung volumes are low normal.     Mild right basilar infiltrate.        Medications Administered     piperacillin-tazobactam (ZOSYN) 4.5 g in 0.9% sodium chloride (MBP/ADV) 100 mL MBP     Admin Date  12/09/2021 Action  New Bag Dose  4.5 g Rate  200 mL/hr Route  IntraVENous Administered By  Yao Gomez RN          sodium chloride (NS) flush 5-10 mL     Admin Date  12/09/2021 Action  Given Dose  10 mL Route  IntraVENous Administered By  Yan Slater          vancomycin (VANCOCIN) 1500 mg in  ml infusion     Admin Date  12/09/2021 Action  New Bag Dose  1,500 mg Rate  333.3 mL/hr Route  IntraVENous Administered By  Miguelito Murillo RN                Signed:  Norina Lesch, MD    Part of this note may have been written by using a voice dictation software. The note has been proof read but may still contain some grammatical/other typographical errors.

## 2021-12-09 NOTE — PROGRESS NOTES
Juan C Wilks is a 77 y.o. female with medical history significant for hypertension, hyperlipidemia, hypothyroidism who was admitted due to new onset of confusion that has worsened over the last several days. Patient was noted to have worsening right lower lobe infiltrate compared to chest x-ray from 11/30/2021. Patient was started on Rocephin and azithromycin. Rapid Covid test is negative. Patient with intermittent confusions throughout the day. Requiring as needed Ativan to control agitation/anxiety. Patient has been hemodynamically stable and saturating well on room air. For complete assessment and plan, please see H&P.       No charges will be billed for this encounter as patient was just admitted this AM.

## 2021-12-09 NOTE — PROGRESS NOTES
Addendum entered and electronically signed by Laura Watson NP-C  08/22/17 11:

29: 


Clarification: Final diagnoses, anemia of chronic disease, ferritin level 

pending.  


Change Protonix to 40 mg twice a day.

















Under plan: Holter monitor for 30 days








Addendum entered and electronically signed by Laura Watson NP-C  08/22/17 11:

02: 


Final diagnoses #9 iron deficient anemia








Original Note:








<Laura Watson - Last Filed: 08/22/17 10:58>





Providers


Date of admission: 


08/16/17 23:25





Expected date of discharge: 08/22/17


Attending physician: 


Joey Barnhart


Consults: 





 





08/16/17 23:26


Consult Physician Urgent 


   Consulting Provider: Eduarda Black


   Consult Reason/Comments: Patient known to physician


   Do you want consulting provider notified?: Yes, Notify in am


Consult Physician Urgent 


   Consulting Provider: Yaneth Noriega


   Consult Reason/Comments: CVA


   Do you want consulting provider notified?: Yes, Notify in am





08/17/17 02:11


Consult Physician Urgent 


   Consulting Provider: [facility]


   Consult Reason/Comments: New onset Afib


   Do you want consulting provider notified?: Yes, Notify in am





08/17/17 16:55


Consult Physician Routine 


   Consulting Provider: Jerri Saleem


   Consult Reason/Comments: RA


   Do you want consulting provider notified?: Yes











Primary care physician: 


Eduarda Black





Hospital Course: 


Final Diagnoses:





#1 Acute CVA with right-sided arm and leg weakness with left facial droop, 

improving


#2 atrial fibrillation with rapid ventricular response, now in normal sinus 

rhythm.  New-onset.  Paroxysmal in nature.


#3 Mild dementia


#4 recurrent UTIs


#5 asthma, no acute exacerbation


#6 acute exacerbation of rheumatoid arthritis


#7 moderate to severe protein calorie malnutrition, weight loss of greater than 

40 pounds in 1 year.


#8 gait dysfunction, uses walker with assistance, history of right foot drop.














Hospital course:This is an 82-year-old  gentleman with a known history 

of asthma, rheumatoid arthritis, history of prostate cancer status post 

radiation seeds who presented to the hospital with symptoms of right-sided 

weakness and was diagnosed with CVA/TIA, moderate to severe protein calorie 

malnutrition.  Patient was found to have new diagnosis of atrial fibrillation.  

At this time he continues to be in a normal sinus rhythm. He is on Eliquis for 

anticoagulation.  


Echocardiogram revealed an ejection fraction of 55-60%.  A cardiology, 

neurology and rheumatology.Patient had workup done including CT head, CT 

angiogram, EEG, carotid duplex and MRI of the brain.MRI of the brain does 

appear to be possible early ischemia in the left luciano, suggesting infarction.  

He is admitted atrophic changes and high right frontal meningioma.  Initiated 

on beta blocker, dose decreased related to bradycardia, anticoagulated on 

Eliquis, statin initiated.  Significant clinical improvement.  Patient has been 

cleared for discharge by all consults.  Patient is being discharged to [facility] in a stable condition with guarded prognosis.











The impression and plan of care has been dictated as directed.





:


I performed a H&P examination of this patient and discussed the same with the 

dictator.  I agree with the dictator's note.  Any additional findings/opinions/

etc. will be noted.





Patient Condition at Discharge: Stable





Plan - Discharge Summary


New Discharge Prescriptions: 


New


   Apixaban [Eliquis] 2.5 mg PO BID  tab


   Atorvastatin [Lipitor] 40 mg PO HS  tab


   Ergocalciferol [Vitamin D2 (DRISDOL)] 50,000 unit PO Q7D  cap


   Megestrol [Megace] 400 mg PO DAILY  dose


   Metoprolol Tartrate [Lopressor] 12.5 mg PO BID  tab


   Mirtazapine [Remeron] 7.5 mg PO HS  tab


   Multivitamins, Thera [Multivitamin (formulary)] 1 tab PO DAILY@1200 #1 tablet


   Pantoprazole [Protonix] 40 mg PO AC-BRKFST  tab


   predniSONE 30 mg PO DAILY  tab


   QUEtiapine [SEROquel] 12.5 mg PO HS PRN  tab


     PRN Reason: Agitation


   Pantoprazole Sodium [Protonix] 40 mg PO BID #60 tablet.dr





Continue


   Fluticasone/Salmeterol [Advair 250-50 Diskus] 1 puff INHALATION RT-BID


   Folic Acid 1 mg PO QAM





Changed


   Ipratropium-Albuterol Nebulize [Duoneb 0.5 mg-3 mg/3 ml Soln] 3 ml 

INHALATION RT-QID #0 





Discontinued


   Escitalopram [Lexapro] 10 mg PO QAM


Discharge Medication List





Fluticasone/Salmeterol [Advair 250-50 Diskus] 1 puff INHALATION RT-BID 08/16/17 

[History]


Folic Acid 1 mg PO QAM 08/16/17 [History]


Apixaban [Eliquis] 2.5 mg PO BID  tab 08/22/17 [Rx]


Atorvastatin [Lipitor] 40 mg PO HS  tab 08/22/17 [Rx]


Ergocalciferol [Vitamin D2 (DRISDOL)] 50,000 unit PO Q7D  cap 08/22/17 [Rx]


Ipratropium-Albuterol Nebulize [Duoneb 0.5 mg-3 mg/3 ml Soln] 3 ml INHALATION RT

-QID #0  08/22/17 [Rx]


Megestrol [Megace] 400 mg PO DAILY  dose 08/22/17 [Rx]


Metoprolol Tartrate [Lopressor] 12.5 mg PO BID  tab 08/22/17 [Rx]


Mirtazapine [Remeron] 7.5 mg PO HS  tab 08/22/17 [Rx]


Multivitamins, Thera [Multivitamin (formulary)] 1 tab PO DAILY@1200 #1 tablet 08 /22/17 [Rx]


Pantoprazole Sodium [Protonix] 40 mg PO BID #60 tablet.dr 08/22/17 [Rx]


Pantoprazole [Protonix] 40 mg PO AC-BRKFST  tab 08/22/17 [Rx]


QUEtiapine [SEROquel] 12.5 mg PO HS PRN  tab 08/22/17 [Rx]


predniSONE 30 mg PO DAILY  tab 08/22/17 [Rx]








Follow up Appointment(s)/Referral(s): 


Yaneth Noriega MD [STAFF PHYSICIAN] - 09/14/17 10:15 am


Bal Bustillos MD [STAFF PHYSICIAN] - 09/08/17 4:30 pm


Anton Hernandez MD [STAFF PHYSICIAN] - 3 Days (While at [facility])


Jerri Saleem MD [STAFF PHYSICIAN] - 09/05/17 8:45 am (Appointment at 9:30, 

arrive at 8:45 for new patient forms)


Eduarda Black MD [Primary Care Provider] - 1 Week (after DC from [facility])


Activity/Diet/Wound Care/Special Instructions: 


[facility]    further steroid tapering to be completed at follow-up visit with 

dr. saleem.





Diet: Cardiac, high protein, high calorie,encourage oral intake, Magic cups 

between meals ( No chocolate please))





Activity: As tolerated with walker and assistance








Speech therapy





Calorie Count with each meal , close monitoring of diet intake,  BMI 18.4








CBC, BMP,Magnesium in 3 days











Please have podiatrist that comes in to ECF trim both toenails and fingernails











Discharge Disposition: TRANSFER TO SNF/ECF





<Jamey Barnhart - Last Filed: 08/23/17 00:49>


Hospital Course: 


Patient was seen and examined with NP and reports reviewed. I agree with plan 

and management VANCO DAILY FOLLOW UP NOTE  [address] [facility] Pharmacokinetic Monitoring Service - Vancomycin    Consulting Provider: Dr. Amor Michel   Indication: sepsis of unknown etiology  Target Concentration: Goal AUC/GRICELDA 400-600 mg*hr/L  Day of Therapy: 1  Additional Antimicrobials: azithromycin, ceftriaxone    Pertinent Laboratory Values: Wt Readings from Last 1 Encounters:   12/08/21 77.1 kg (170 lb)     Temp Readings from Last 1 Encounters:   12/08/21 98.4 °F (36.9 °C)     No components found for: PROCAL  Recent Labs     12/09/21  0136 12/08/21  1711   BUN  --  18   CREA  --  1.36*   WBC  --  4.6   PCT <0.05  --    LAC 0.9  --      Estimated Creatinine Clearance: 43.4 mL/min (A) (based on SCr of 1.36 mg/dL (H)). No results found for: Nadiya Sero    MRSA Nasal Swab: N/A. Non-respiratory infection.     Assessment:  Date/Time Dose Concentration AUC         Note: Serum concentrations collected for AUC dosing may appear elevated if collected in close proximity to the dose administered, this is not necessarily an indication of toxicity    Plan:  Dosing recommendations based on Bayesian software  Start vancomycin 1000 mg q24h  Anticipated AUC of 443 and trough concentration of 13.7 at steady state  Renal labs as indicated   Vancomycin concentration ordered as clinically indicated  Pharmacy will continue to monitor patient and adjust therapy as indicated    Thank you for the consult,  RICKIE Graves

## 2021-12-09 NOTE — ASSESSMENT & PLAN NOTE
Continue levothyroxine. Significantly elevated TSH 1 week ago. Hypothyroidism could be contributing to confusion as well although not likely.

## 2021-12-09 NOTE — ASSESSMENT & PLAN NOTE
Likely secondary to pneumonia and possibly also to some dehydration. Pneumonia being treated with antibiotics. Does not appear hypoxic. Lactic acid and procalcitonin is unremarkable. Covid is negative. IV fluids. Will monitor closely and adjust treatment as necessary.

## 2021-12-09 NOTE — ASSESSMENT & PLAN NOTE
Worsening right base infiltrates. Rocephin and azithromycin. Likely cause of delirium. Will monitor closely and adjust treatment as necessary.

## 2021-12-09 NOTE — PROGRESS NOTES
Chart review complete, CM met with pt at bedside, pt found sitting up in bed alert to and able to answer questions but unclear on some answers, pt tells CM she and son live together in 1 level house with 1 step to enter and does not have any DME but per chart pt was provided with a RW on last admission, pt states she is independent with ADLS and does not drive son provides transportation per chart, pt went home on 12/6 with [facility] with services of RN PT/OT, Aid, may need to send resume orders when pt ready for dc home. Demographics, insurance and PCP confirmed. CM will remain available to assist as needed during hospital stay. Care Management Interventions  PCP Verified by CM:  Yes Mireya Keating NP)  MyChart Signup: No  Discharge Durable Medical Equipment: No  Physical Therapy Consult: No  Occupational Therapy Consult: No  Speech Therapy Consult: No  Support Systems: Child(annamaria)  Confirm Follow Up Transport: Family  Discharge Location  Discharge Placement: Home with family assistance

## 2021-12-10 PROBLEM — G93.41 ACUTE METABOLIC ENCEPHALOPATHY: Status: ACTIVE | Noted: 2021-12-10

## 2021-12-10 LAB
ANION GAP SERPL CALC-SCNC: 7 MMOL/L (ref 7–16)
BUN SERPL-MCNC: 16 MG/DL (ref 8–23)
CALCIUM SERPL-MCNC: 9.5 MG/DL (ref 8.3–10.4)
CHLORIDE SERPL-SCNC: 104 MMOL/L (ref 98–107)
CO2 SERPL-SCNC: 29 MMOL/L (ref 21–32)
CREAT SERPL-MCNC: 1.05 MG/DL (ref 0.6–1)
ERYTHROCYTE [DISTWIDTH] IN BLOOD BY AUTOMATED COUNT: 12.4 % (ref 11.9–14.6)
GLUCOSE SERPL-MCNC: 84 MG/DL (ref 65–100)
HCT VFR BLD AUTO: 27.6 % (ref 35.8–46.3)
HGB BLD-MCNC: 9.1 G/DL (ref 11.7–15.4)
MCH RBC QN AUTO: 33.1 PG (ref 26.1–32.9)
MCHC RBC AUTO-ENTMCNC: 33 G/DL (ref 31.4–35)
MCV RBC AUTO: 100.4 FL (ref 79.6–97.8)
NRBC # BLD: 0 K/UL (ref 0–0.2)
PLATELET # BLD AUTO: 266 K/UL (ref 150–450)
PMV BLD AUTO: 9.2 FL (ref 9.4–12.3)
POTASSIUM SERPL-SCNC: 3.3 MMOL/L (ref 3.5–5.1)
RBC # BLD AUTO: 2.75 M/UL (ref 4.05–5.2)
SODIUM SERPL-SCNC: 140 MMOL/L (ref 136–145)
WBC # BLD AUTO: 4.4 K/UL (ref 4.3–11.1)

## 2021-12-10 PROCEDURE — 36415 COLL VENOUS BLD VENIPUNCTURE: CPT

## 2021-12-10 PROCEDURE — 74011250636 HC RX REV CODE- 250/636: Performed by: EMERGENCY MEDICINE

## 2021-12-10 PROCEDURE — 85027 COMPLETE CBC AUTOMATED: CPT

## 2021-12-10 PROCEDURE — 74011250636 HC RX REV CODE- 250/636: Performed by: INTERNAL MEDICINE

## 2021-12-10 PROCEDURE — 65270000029 HC RM PRIVATE

## 2021-12-10 PROCEDURE — 80048 BASIC METABOLIC PNL TOTAL CA: CPT

## 2021-12-10 PROCEDURE — 74011250637 HC RX REV CODE- 250/637: Performed by: EMERGENCY MEDICINE

## 2021-12-10 PROCEDURE — 74011000258 HC RX REV CODE- 258: Performed by: EMERGENCY MEDICINE

## 2021-12-10 PROCEDURE — 74011250637 HC RX REV CODE- 250/637: Performed by: INTERNAL MEDICINE

## 2021-12-10 RX ORDER — VANCOMYCIN HYDROCHLORIDE
1250 EVERY 24 HOURS
Status: DISCONTINUED | OUTPATIENT
Start: 2021-12-10 | End: 2021-12-10

## 2021-12-10 RX ADMIN — VITAMIN D, TAB 1000IU (100/BT) 4000 UNITS: 25 TAB at 09:11

## 2021-12-10 RX ADMIN — AMLODIPINE BESYLATE 5 MG: 10 TABLET ORAL at 09:11

## 2021-12-10 RX ADMIN — Medication 10 ML: at 22:11

## 2021-12-10 RX ADMIN — Medication 10 ML: at 14:27

## 2021-12-10 RX ADMIN — AZITHROMYCIN MONOHYDRATE 500 MG: 250 TABLET ORAL at 09:12

## 2021-12-10 RX ADMIN — Medication 10 ML: at 05:42

## 2021-12-10 RX ADMIN — VANCOMYCIN HYDROCHLORIDE 1000 MG: 1 INJECTION, POWDER, LYOPHILIZED, FOR SOLUTION INTRAVENOUS at 03:35

## 2021-12-10 RX ADMIN — LEVOTHYROXINE SODIUM 150 MCG: 0.05 TABLET ORAL at 09:12

## 2021-12-10 RX ADMIN — CEFTRIAXONE 1 G: 1 INJECTION, POWDER, FOR SOLUTION INTRAMUSCULAR; INTRAVENOUS at 09:11

## 2021-12-10 RX ADMIN — EZETIMIBE 10 MG: 10 TABLET ORAL at 09:11

## 2021-12-10 RX ADMIN — CARVEDILOL 12.5 MG: 3.12 TABLET, FILM COATED ORAL at 09:11

## 2021-12-10 RX ADMIN — ENOXAPARIN SODIUM 40 MG: 100 INJECTION SUBCUTANEOUS at 09:10

## 2021-12-10 RX ADMIN — ATORVASTATIN CALCIUM 80 MG: 40 TABLET, FILM COATED ORAL at 09:12

## 2021-12-10 RX ADMIN — SERTRALINE 150 MG: 100 TABLET, FILM COATED ORAL at 09:11

## 2021-12-10 RX ADMIN — FAMOTIDINE 40 MG: 20 TABLET ORAL at 09:11

## 2021-12-10 RX ADMIN — Medication 10 ML: at 22:12

## 2021-12-10 RX ADMIN — LISINOPRIL AND HYDROCHLOROTHIAZIDE 1 TABLET: 12.5; 2 TABLET ORAL at 09:12

## 2021-12-10 NOTE — PROGRESS NOTES
TRANSFER - OUT REPORT:    Verbal report given to GRAY Torres(name) on Charlie Colón  being transferred to [address] [place] [address]([facility]) for routine progression of care       Report consisted of patients Situation, Background, Assessment and   Recommendations(SBAR). Information from the following report(s) SBAR was reviewed with the receiving nurse. Lines:   Peripheral IV 12/09/21 Left; Posterior Hand (Active)   Site Assessment Clean, dry, & intact 12/09/21 0805   Phlebitis Assessment 0 12/09/21 0805   Infiltration Assessment 0 12/09/21 0805   Dressing Status Clean, dry, & intact 12/09/21 0805   Dressing Type Tape; Transparent 12/09/21 0805   Hub Color/Line Status Flushed; Patent 12/09/21 0805   Alcohol Cap Used No 12/09/21 0414       Peripheral IV 12/09/21 Left; Upper Arm (Active)   Site Assessment Clean, dry, & intact 12/09/21 0805   Phlebitis Assessment 0 12/09/21 0805   Infiltration Assessment 0 12/09/21 0805   Dressing Status Clean, dry, & intact 12/09/21 0805   Dressing Type Transparent; Tape 12/09/21 0805   Hub Color/Line Status Flushed; Infusing 12/09/21 0805        Opportunity for questions and clarification was provided.       Patient transported with:   [employer]

## 2021-12-10 NOTE — PROGRESS NOTES
TRANSFER - IN REPORT:    Verbal report received from Celeste Sharpe RN on Nelsy Ruiz being received from ED for routine progression of care. Report consisted of patients Situation, Background, Assessment and Recommendations(SBAR). Information from the following report(s) SBAR, Kardex, ED Summary, Intake/Output, MAR, Recent Results and Med Rec Status was reviewed. Opportunity for questions and clarification was provided. Assessment completed upon patients arrival to unit and care assumed. Patient received to room 530. Patient connected to monitor and assessment completed. Plan of care reviewed. Patient oriented to room and call light. Patient aware to use call light to communicate any chest pain or needs. Admission skin assessment completed with second RN and reveals the following: Patients sacrum is clean, dry and intact. Heels are dry and flaky but otherwise intact. No other skin issues noted.

## 2021-12-10 NOTE — PROGRESS NOTES
Received pt from GRAY Wiseman) in stable condition. Pt in bed resting quietly. Resp even & unlabored on room air; no acute signs of distress noted. Bed low & locked; call light in reach; alarm active & audible; no needs voiced.

## 2021-12-10 NOTE — PROGRESS NOTES
's initial visit to convey care and concern. I abbreviated the visit to allow rest. No needs were voiced during the visit. Chaplains remain available for follow-up.      Cirilo Gallego 67  Board Certified

## 2021-12-10 NOTE — PROGRESS NOTES
VANCO DAILY FOLLOW UP NOTE  [address] Pharmacokinetic Monitoring Service - Vancomycin    Consulting Provider: Dr. Miguel Angel Beasley   Indication: sepsis of unknown etiology  Target Concentration: Goal AUC/GRICELDA 400-600 mg*hr/L  Day of Therapy: 2  Additional Antimicrobials: azithromycin, ceftriaxone    Pertinent Laboratory Values: Wt Readings from Last 1 Encounters:   12/09/21 78.2 kg (172 lb 6.4 oz)     Temp Readings from Last 1 Encounters:   12/10/21 97.6 °F (36.4 °C)     No components found for: PROCAL  Recent Labs     12/10/21  0529 12/09/21  1026 12/09/21  0136 12/08/21  1711   BUN 16 17  --  18   CREA 1.05* 1.06*  --  1.36*   WBC 4.4 4.5  --  4.6   PCT  --   --  <0.05  --    LAC  --   --  0.9  --      Estimated Creatinine Clearance: 56.2 mL/min (A) (based on SCr of 1.05 mg/dL (H)). No results found for: Patricio Rosado    MRSA Nasal Swab: N/A. Non-respiratory infection.     Assessment:  Date/Time Dose Concentration AUC         Note: Serum concentrations collected for AUC dosing may appear elevated if collected in close proximity to the dose administered, this is not necessarily an indication of toxicity    Plan:  Dosing recommendations based on Bayesian software  Improvement in renal function labs and current regimen now predicted to be subtherapeutic  Will increase the dose empirically to 1250 mg every 24 hours  Anticipated AUC of 470 and trough of 13.7  Renal labs as indicated   Vancomycin concentration ordered 12/11 @ 0400  Pharmacy will continue to monitor patient and adjust therapy as indicated    Thank you for the consult,  Maribell Bajwa, PharmD

## 2021-12-10 NOTE — PROGRESS NOTES
Hospitalist Progress Note   Admit Date:  2021  4:54 PM   Name:  Clearnce Oppenheim   Age:  77 y.o. Sex:  female  :  1955   MRN:  [id number]   Room:  [facility]    Presenting Complaint: Fall    Reason(s) for Admission: CAP (community acquired pneumonia) [J18.9]     Hospital Course & Interval History:     Please refer to the admission H&P for details of presentation. In summary, Clearnce Oppenheim is a 77 y.o. female with medical history significant for hypertension, hyperlipidemia, hypothyroidism who was admitted due to new onset of confusion that has worsened over the last several days. Patient was noted to have worsening right lower lobe infiltrate compared to chest x-ray from 2021. Patient was noted to have acute metabolic encephalopathy but CT head showed no evidence of acute cranial hemorrhage or abnormalities      Subjective/24 hr Events (12/10/21) :  Patient is seen and examined at bedside. No acute events reported overnight by nursing staff. Patient was intermittently confused requiring some IV medication to control her agitation/anxiety. This morning, patient is alert and awake but oriented to self and time only. Patient denies fever, chills, chest pains, shortness of breath, n/v, abdominal pain. Review of Systems: 10 point review of systems is otherwise negative with the exception of the elements mentioned above. Assessment & Plan:     RLL Pneumonia   Chest x-ray noted to have worsening right lower lobe infiltrate compared to x-ray from .  -On ceftriaxone azithromycin  -Currently on room air without any signs of hypoxia. Monitor and supplement oxygen as needed    Acute Metabolic encephalopathy  T head negative for any acute intracranial abnormalities.   Likely due to ongoing underlying infection  - treat underlying infection as above      Acquired hypothyroidism : synthroid    Essential hypertension:  - carvedilol, lisinopril-hydrochlorothiazide, amlodipine. Mixed hyperlipidemia : On atorvastatin and ezetimibe      Diet:  ADULT DIET Regular; Low Fat/Low Chol/High Fiber/GISELLE  DVT PPx: lovenox  Code status: Full Code    Hospital Problems as of 12/10/2021 Date Reviewed: 12/9/2021          Codes Class Noted - Resolved POA    Acute metabolic encephalopathy LXG-15-LO: G93.41  ICD-9-CM: 348.31  12/10/2021 - Present Yes        Delirium ICD-10-CM: R41.0  ICD-9-CM: 780.09  12/9/2021 - Present Yes        * (Principal) RLL pneumonia ICD-10-CM: J18.9  ICD-9-CM: 641  11/30/2021 - Present Yes        Essential hypertension ICD-10-CM: I10  ICD-9-CM: 401.9  1/4/2017 - Present Yes        Acquired hypothyroidism ICD-10-CM: E03.9  ICD-9-CM: 244.9  10/5/2016 - Present Yes        Mixed hyperlipidemia ICD-10-CM: E78.2  ICD-9-CM: 272.2  10/5/2016 - Present Yes              Objective:     Patient Vitals for the past 24 hrs:   Temp Pulse Resp BP SpO2   12/10/21 1359 97.6 °F (36.4 °C) 83 17 (!) 85/59 99 %   12/10/21 1132 97.6 °F (36.4 °C) 84 16 103/85 98 %   12/10/21 0752 97.6 °F (36.4 °C) 76 16 (!) 152/82 99 %   12/10/21 0343 -- 74 16 (!) 150/80 100 %   12/09/21 2136 97.4 °F (36.3 °C) 78 16 (!) 161/79 97 %   12/09/21 2028 99.4 °F (37.4 °C) 66 18 -- 96 %   12/09/21 2010 -- -- -- (!) 144/75 --     Oxygen Therapy  O2 Sat (%): 99 % (12/10/21 1359)  Pulse via Oximetry: 65 beats per minute (12/09/21 0314)  O2 Device: None (Room air) (12/10/21 1359)    Estimated body mass index is 24.74 kg/m² as calculated from the following:    Height as of this encounter: 5' 10\" (1.778 m). Weight as of this encounter: 78.2 kg (172 lb 6.4 oz). Intake/Output Summary (Last 24 hours) at 12/10/2021 1513  Last data filed at 12/10/2021 0906  Gross per 24 hour   Intake --   Output 800 ml   Net -800 ml         Physical Exam:     Blood pressure (!) 85/59, pulse 83, temperature 97.6 °F (36.4 °C), resp. rate 17, height 5' 10\" (1.778 m), weight 78.2 kg (172 lb 6.4 oz), SpO2 99 %. General:    Well nourished. No overt distress, lethargic  Head:  Normocephalic, atraumatic  Eyes:  Sclerae appear normal.  Pupils equally round. ENT:  Nares appear normal, no drainage. Moist oral mucosa  Neck:  No restricted ROM. Trachea midline   CV:   RRR. No m/r/g. No jugular venous distension. Lungs:   Scattered crackles. No wheezing. Respirations even, unlabored  Abdomen: Bowel sounds present. Soft, nontender, nondistended. Extremities: No cyanosis or clubbing. No edema  Skin:     No rashes and normal coloration. Warm and dry. Neuro:  CN II-XII grossly intact. A&Ox2  Psych:  Normal mood and affect. I have reviewed ordered lab tests and independently visualized imaging below:    Recent Labs:  Recent Results (from the past 48 hour(s))   CBC WITH AUTOMATED DIFF    Collection Time: 12/08/21  5:11 PM   Result Value Ref Range    WBC 4.6 4.3 - 11.1 K/uL    RBC 2.98 (L) 4.05 - 5.2 M/uL    HGB 10.1 (L) 11.7 - 15.4 g/dL    HCT 30.5 (L) 35.8 - 46.3 %    .3 (H) 79.6 - 97.8 FL    MCH 33.9 (H) 26.1 - 32.9 PG    MCHC 33.1 31.4 - 35.0 g/dL    RDW 13.5 11.9 - 14.6 %    PLATELET 075 936 - 470 K/uL    MPV 9.5 9.4 - 12.3 FL    ABSOLUTE NRBC 0.00 0.0 - 0.2 K/uL    DF AUTOMATED      NEUTROPHILS 66 43 - 78 %    LYMPHOCYTES 25 13 - 44 %    MONOCYTES 6 4.0 - 12.0 %    EOSINOPHILS 1 0.5 - 7.8 %    BASOPHILS 1 0.0 - 2.0 %    IMMATURE GRANULOCYTES 1 0.0 - 5.0 %    ABS. NEUTROPHILS 3.0 1.7 - 8.2 K/UL    ABS. LYMPHOCYTES 1.1 0.5 - 4.6 K/UL    ABS. MONOCYTES 0.3 0.1 - 1.3 K/UL    ABS. EOSINOPHILS 0.0 0.0 - 0.8 K/UL    ABS. BASOPHILS 0.1 0.0 - 0.2 K/UL    ABS. IMM.  GRANS. 0.0 0.0 - 0.5 K/UL   METABOLIC PANEL, COMPREHENSIVE    Collection Time: 12/08/21  5:11 PM   Result Value Ref Range    Sodium 139 136 - 145 mmol/L    Potassium 5.1 3.5 - 5.1 mmol/L    Chloride 107 98 - 107 mmol/L    CO2 26 21 - 32 mmol/L    Anion gap 6 (L) 7 - 16 mmol/L    Glucose 109 (H) 65 - 100 mg/dL    BUN 18 8 - 23 MG/DL    Creatinine 1.36 (H) 0.6 - 1.0 MG/DL    GFR est AA 50 (L) >60 ml/min/1.73m2    GFR est non-AA 41 (L) >60 ml/min/1.73m2    Calcium 9.8 8.3 - 10.4 MG/DL    Bilirubin, total 0.6 0.2 - 1.1 MG/DL    ALT (SGPT) 59 12 - 65 U/L    AST (SGOT) 74 (H) 15 - 37 U/L    Alk.  phosphatase 69 50 - 136 U/L    Protein, total 9.4 (H) 6.3 - 8.2 g/dL    Albumin 3.1 (L) 3.2 - 4.6 g/dL    Globulin 6.3 (H) 2.3 - 3.5 g/dL    A-G Ratio 0.5 (L) 1.2 - 3.5     EKG, 12 LEAD, INITIAL    Collection Time: 12/08/21  5:13 PM   Result Value Ref Range    Ventricular Rate 67 BPM    Atrial Rate 67 BPM    P-R Interval 158 ms    QRS Duration 82 ms    Q-T Interval 440 ms    QTC Calculation (Bezet) 464 ms    Calculated P Axis 67 degrees    Calculated R Axis 76 degrees    Calculated T Axis 75 degrees    Diagnosis       Sinus rhythm with occasional Premature ventricular complexes  Possible Anterior infarct , age undetermined  Abnormal ECG  When compared with ECG of 06-NOV-2015 21:10,  Premature ventricular complexes are now Present  Borderline criteria for Anterior infarct are now Present  Confirmed by SHAR RIGGS (), Luz Marina Carbone ([id number]) on 12/9/2021 7:14:24 AM     LACTIC ACID    Collection Time: 12/09/21  1:36 AM   Result Value Ref Range    Lactic acid 0.9 0.4 - 2.0 MMOL/L   CULTURE, BLOOD    Collection Time: 12/09/21  1:36 AM    Specimen: Blood   Result Value Ref Range    Special Requests: RIGHT  HAND        Culture result: NO GROWTH 1 DAY     URINALYSIS W/ RFLX MICROSCOPIC    Collection Time: 12/09/21  1:36 AM   Result Value Ref Range    Color YELLOW      Appearance CLEAR      Specific gravity 1.021 1.001 - 1.023      pH (UA) 5.5 5.0 - 9.0      Protein TRACE (A) NEG mg/dL    Glucose Negative mg/dL    Ketone Negative NEG mg/dL    Bilirubin Negative NEG      Blood Negative NEG      Urobilinogen 0.2 0.2 - 1.0 EU/dL    Nitrites Negative NEG      Leukocyte Esterase Negative NEG      WBC 0-3 0 /hpf    RBC 0-3 0 /hpf    Epithelial cells 0-3 0 /hpf    Bacteria 0 0 /hpf    Casts 10-20 0 /lpf   COVID-19 RAPID TEST Collection Time: 12/09/21  1:36 AM   Result Value Ref Range    Specimen source NASAL      COVID-19 rapid test Not detected NOTD     PROCALCITONIN    Collection Time: 12/09/21  1:36 AM   Result Value Ref Range    Procalcitonin <0.05 0.00 - 0.49 ng/mL   CULTURE, BLOOD    Collection Time: 12/09/21  4:26 AM    Specimen: Blood   Result Value Ref Range    Special Requests: RIGHT  ARM        Culture result: NO GROWTH 1 DAY     METABOLIC PANEL, BASIC    Collection Time: 12/09/21 10:26 AM   Result Value Ref Range    Sodium 140 136 - 145 mmol/L    Potassium 3.3 (L) 3.5 - 5.1 mmol/L    Chloride 108 (H) 98 - 107 mmol/L    CO2 24 21 - 32 mmol/L    Anion gap 8 7 - 16 mmol/L    Glucose 82 65 - 100 mg/dL    BUN 17 8 - 23 MG/DL    Creatinine 1.06 (H) 0.6 - 1.0 MG/DL    GFR est AA >60 >60 ml/min/1.73m2    GFR est non-AA 55 (L) >60 ml/min/1.73m2    Calcium 9.2 8.3 - 10.4 MG/DL   CBC WITH AUTOMATED DIFF    Collection Time: 12/09/21 10:26 AM   Result Value Ref Range    WBC 4.5 4.3 - 11.1 K/uL    RBC 2.53 (L) 4.05 - 5.2 M/uL    HGB 8.7 (L) 11.7 - 15.4 g/dL    HCT 26.6 (L) 35.8 - 46.3 %    .1 (H) 79.6 - 97.8 FL    MCH 34.4 (H) 26.1 - 32.9 PG    MCHC 32.7 31.4 - 35.0 g/dL    RDW 12.7 11.9 - 14.6 %    PLATELET 984 341 - 854 K/uL    MPV 9.1 (L) 9.4 - 12.3 FL    ABSOLUTE NRBC 0.00 0.0 - 0.2 K/uL    DF AUTOMATED      NEUTROPHILS 62 43 - 78 %    LYMPHOCYTES 28 13 - 44 %    MONOCYTES 9 4.0 - 12.0 %    EOSINOPHILS 1 0.5 - 7.8 %    BASOPHILS 1 0.0 - 2.0 %    IMMATURE GRANULOCYTES 0 0.0 - 5.0 %    ABS. NEUTROPHILS 2.8 1.7 - 8.2 K/UL    ABS. LYMPHOCYTES 1.2 0.5 - 4.6 K/UL    ABS. MONOCYTES 0.4 0.1 - 1.3 K/UL    ABS. EOSINOPHILS 0.0 0.0 - 0.8 K/UL    ABS. BASOPHILS 0.0 0.0 - 0.2 K/UL    ABS. IMM.  GRANS. 0.0 0.0 - 0.5 K/UL   CBC W/O DIFF    Collection Time: 12/10/21  5:29 AM   Result Value Ref Range    WBC 4.4 4.3 - 11.1 K/uL    RBC 2.75 (L) 4.05 - 5.2 M/uL    HGB 9.1 (L) 11.7 - 15.4 g/dL    HCT 27.6 (L) 35.8 - 46.3 %    .4 (H) 79.6 - 97.8 FL    MCH 33.1 (H) 26.1 - 32.9 PG    MCHC 33.0 31.4 - 35.0 g/dL    RDW 12.4 11.9 - 14.6 %    PLATELET 093 985 - 772 K/uL    MPV 9.2 (L) 9.4 - 12.3 FL    ABSOLUTE NRBC 0.00 0.0 - 0.2 K/uL   METABOLIC PANEL, BASIC    Collection Time: 12/10/21  5:29 AM   Result Value Ref Range    Sodium 140 136 - 145 mmol/L    Potassium 3.3 (L) 3.5 - 5.1 mmol/L    Chloride 104 98 - 107 mmol/L    CO2 29 21 - 32 mmol/L    Anion gap 7 7 - 16 mmol/L    Glucose 84 65 - 100 mg/dL    BUN 16 8 - 23 MG/DL    Creatinine 1.05 (H) 0.6 - 1.0 MG/DL    GFR est AA >60 >60 ml/min/1.73m2    GFR est non-AA 56 (L) >60 ml/min/1.73m2    Calcium 9.5 8.3 - 10.4 MG/DL       All Micro Results     Procedure Component Value Units Date/Time    CULTURE, BLOOD [[id number]] Collected: 12/09/21 0136    Order Status: Completed Specimen: Blood Updated: 12/10/21 0641     Special Requests: --        RIGHT  HAND       Culture result: NO GROWTH 1 DAY       CULTURE, BLOOD [[id number]] Collected: 12/09/21 0426    Order Status: Completed Specimen: Blood Updated: 12/10/21 0641     Special Requests: --        RIGHT  ARM       Culture result: NO GROWTH 1 DAY       COVID-19 RAPID TEST [[id number]] Collected: 12/09/21 0136    Order Status: Completed Specimen: Nasopharyngeal Updated: 12/09/21 0223     Specimen source NASAL        COVID-19 rapid test Not detected        Comment:      The specimen is NEGATIVE for SARS-CoV-2, the novel coronavirus associated with COVID-19. A negative result does not rule out COVID-19. This test has been authorized by the FDA under an Emergency Use Authorization (EUA) for use by authorized laboratories. Fact sheet for Healthcare Providers: ConventionUpdate.co.nz  Fact sheet for Patients: ConventionUpdate.co.nz       Methodology: Isothermal Nucleic Acid Amplification               Other Studies:  No results found.     Current Meds:  Current Facility-Administered Medications   Medication Dose Route Frequency    vancomycin (VANCOCIN) 1250 mg in  ml infusion  1,250 mg IntraVENous Q24H    cefTRIAXone (ROCEPHIN) 1 g in 0.9% sodium chloride (MBP/ADV) 50 mL MBP  1 g IntraVENous Q24H    atorvastatin (LIPITOR) tablet 80 mg  80 mg Oral DAILY    carvediloL (COREG) tablet 12.5 mg  12.5 mg Oral DAILY    cholecalciferol (VITAMIN D3) (1000 Units /25 mcg) tablet 4,000 Units  4,000 Units Oral DAILY    famotidine (PEPCID) tablet 40 mg  40 mg Oral DAILY    ezetimibe (ZETIA) tablet 10 mg  10 mg Oral DAILY    amLODIPine (NORVASC) tablet 5 mg  5 mg Oral DAILY    levothyroxine (SYNTHROID) tablet 150 mcg  150 mcg Oral ACB    lisinopril-hydroCHLOROthiazide (PRINZIDE, ZESTORETIC) 20-12.5 mg per tablet 1 Tablet  1 Tablet Oral BID    sertraline (ZOLOFT) tablet 150 mg  150 mg Oral DAILY    sodium chloride (NS) flush 5-40 mL  5-40 mL IntraVENous Q8H    sodium chloride (NS) flush 5-40 mL  5-40 mL IntraVENous PRN    enoxaparin (LOVENOX) injection 40 mg  40 mg SubCUTAneous Q24H    azithromycin (ZITHROMAX) tablet 500 mg  500 mg Oral DAILY    LORazepam (ATIVAN) injection 1 mg  1 mg IntraVENous Q6H PRN    sodium chloride (NS) flush 5-10 mL  5-10 mL IntraVENous Q8H    sodium chloride (NS) flush 5-10 mL  5-10 mL IntraVENous PRN       Signed:  Radha Diehl MD    Part of this note may have been written by using a voice dictation software. The note has been proof read but may still contain some grammatical/other typographical errors.

## 2021-12-10 NOTE — PROGRESS NOTES
Physician Progress Note      Shreya Robert  CSN #:                  [id number]  :                       1955  ADMIT DATE:       2021 4:54 PM  [address] [place] DATE:  RESPONDING  PROVIDER #:        Gerard Strong MD          QUERY TEXT:    Pt admitted with new onset confusion and PNA . Pt noted to have delirium likely 2ndary to PNA . If possible, please document in the progress notes and discharge summary if you are evaluating and / or treating any of the following: The medical record reflects the following:  Risk Factors: PNA, hyperkalemia, elevated cr  Clinical Indicators: new onset confusion worsening over the last few days. notes stating PNA with associated confusion. cxray--infiltrate. CT head neg for acute process. k--5.1, cr--1.36. No hx of cognitive issues noted. Treatment: iv rocephin, azithromycin, adjust tx as necessary. fluids, labs, xray. essential home meds. Options provided:  -- Metabolic encephalopathy  -- Toxic encephalopathy  -- Toxic metabolic encephalopathy  -- Other - I will add my own diagnosis  -- Disagree - Not applicable / Not valid  -- Disagree - Clinically unable to determine / Unknown  -- Refer to Clinical Documentation Reviewer    PROVIDER RESPONSE TEXT:    This patient has metabolic encephalopathy.     Query created by: Frances Mariscal on 12/10/2021 8:04 AM      Electronically signed by:  Gerard Strong MD 12/10/2021 8:08 AM

## 2021-12-10 NOTE — PROGRESS NOTES
Problem: Pressure Injury - Risk of  Goal: *Prevention of pressure injury  Description: Document Fredo Scale and appropriate interventions in the flowsheet. Outcome: Progressing Towards Goal  Note: Pressure Injury Interventions:  Sensory Interventions: Assess changes in LOC, Check visual cues for pain, Keep linens dry and wrinkle-free, Maintain/enhance activity level, Minimize linen layers, Pressure redistribution bed/mattress (bed type)    Moisture Interventions: Absorbent underpads, Internal/External urinary devices, Check for incontinence Q2 hours and as needed, Maintain skin hydration (lotion/cream)    Activity Interventions: Increase time out of bed, Pressure redistribution bed/mattress(bed type), PT/OT evaluation    Mobility Interventions: Pressure redistribution bed/mattress (bed type), PT/OT evaluation    Nutrition Interventions: Document food/fluid/supplement intake, Offer support with meals,snacks and hydration                     Problem: Patient Education: Go to Patient Education Activity  Goal: Patient/Family Education  Outcome: Progressing Towards Goal     Problem: Falls - Risk of  Goal: *Absence of Falls  Description: Document Lakshmi Fall Risk and appropriate interventions in the flowsheet.   Outcome: Progressing Towards Goal  Note: Fall Risk Interventions:  Mobility Interventions: Bed/chair exit alarm, Patient to call before getting OOB, PT Consult for mobility concerns    Mentation Interventions: Adequate sleep, hydration, pain control, Bed/chair exit alarm, More frequent rounding, Reorient patient, Toileting rounds, Update white board    Medication Interventions: Bed/chair exit alarm, Patient to call before getting OOB, Teach patient to arise slowly    Elimination Interventions: Bed/chair exit alarm, Call light in reach, Patient to call for help with toileting needs, Toileting schedule/hourly rounds

## 2021-12-11 LAB
ANION GAP SERPL CALC-SCNC: 7 MMOL/L (ref 7–16)
BUN SERPL-MCNC: 33 MG/DL (ref 8–23)
CALCIUM SERPL-MCNC: 9.1 MG/DL (ref 8.3–10.4)
CHLORIDE SERPL-SCNC: 105 MMOL/L (ref 98–107)
CO2 SERPL-SCNC: 26 MMOL/L (ref 21–32)
CREAT SERPL-MCNC: 2.1 MG/DL (ref 0.6–1)
ERYTHROCYTE [DISTWIDTH] IN BLOOD BY AUTOMATED COUNT: 12.6 % (ref 11.9–14.6)
GLUCOSE SERPL-MCNC: 89 MG/DL (ref 65–100)
HCT VFR BLD AUTO: 24 % (ref 35.8–46.3)
HGB BLD-MCNC: 8 G/DL (ref 11.7–15.4)
MCH RBC QN AUTO: 34 PG (ref 26.1–32.9)
MCHC RBC AUTO-ENTMCNC: 33.3 G/DL (ref 31.4–35)
MCV RBC AUTO: 102.1 FL (ref 79.6–97.8)
NRBC # BLD: 0 K/UL (ref 0–0.2)
PLATELET # BLD AUTO: 238 K/UL (ref 150–450)
PMV BLD AUTO: 9.2 FL (ref 9.4–12.3)
POTASSIUM SERPL-SCNC: 3.3 MMOL/L (ref 3.5–5.1)
RBC # BLD AUTO: 2.35 M/UL (ref 4.05–5.2)
SODIUM SERPL-SCNC: 138 MMOL/L (ref 136–145)
VANCOMYCIN SERPL-MCNC: 17.1 UG/ML
WBC # BLD AUTO: 3.9 K/UL (ref 4.3–11.1)

## 2021-12-11 PROCEDURE — 74011250637 HC RX REV CODE- 250/637: Performed by: EMERGENCY MEDICINE

## 2021-12-11 PROCEDURE — 74011250636 HC RX REV CODE- 250/636: Performed by: EMERGENCY MEDICINE

## 2021-12-11 PROCEDURE — 80202 ASSAY OF VANCOMYCIN: CPT

## 2021-12-11 PROCEDURE — 80048 BASIC METABOLIC PNL TOTAL CA: CPT

## 2021-12-11 PROCEDURE — 74011250637 HC RX REV CODE- 250/637: Performed by: INTERNAL MEDICINE

## 2021-12-11 PROCEDURE — 74011250636 HC RX REV CODE- 250/636: Performed by: INTERNAL MEDICINE

## 2021-12-11 PROCEDURE — 65270000029 HC RM PRIVATE

## 2021-12-11 PROCEDURE — 36415 COLL VENOUS BLD VENIPUNCTURE: CPT

## 2021-12-11 PROCEDURE — 74011000258 HC RX REV CODE- 258: Performed by: EMERGENCY MEDICINE

## 2021-12-11 PROCEDURE — 85027 COMPLETE CBC AUTOMATED: CPT

## 2021-12-11 RX ORDER — SODIUM CHLORIDE 9 MG/ML
75 INJECTION, SOLUTION INTRAVENOUS CONTINUOUS
Status: DISCONTINUED | OUTPATIENT
Start: 2021-12-11 | End: 2021-12-16 | Stop reason: HOSPADM

## 2021-12-11 RX ORDER — POTASSIUM CHLORIDE 20 MEQ/1
40 TABLET, EXTENDED RELEASE ORAL
Status: COMPLETED | OUTPATIENT
Start: 2021-12-11 | End: 2021-12-11

## 2021-12-11 RX ADMIN — ATORVASTATIN CALCIUM 80 MG: 40 TABLET, FILM COATED ORAL at 09:52

## 2021-12-11 RX ADMIN — POTASSIUM CHLORIDE 40 MEQ: 20 TABLET, EXTENDED RELEASE ORAL at 09:51

## 2021-12-11 RX ADMIN — SERTRALINE 150 MG: 100 TABLET, FILM COATED ORAL at 09:51

## 2021-12-11 RX ADMIN — ENOXAPARIN SODIUM 40 MG: 100 INJECTION SUBCUTANEOUS at 09:50

## 2021-12-11 RX ADMIN — SODIUM CHLORIDE 75 ML/HR: 900 INJECTION, SOLUTION INTRAVENOUS at 09:53

## 2021-12-11 RX ADMIN — VITAMIN D, TAB 1000IU (100/BT) 4000 UNITS: 25 TAB at 09:51

## 2021-12-11 RX ADMIN — Medication 10 ML: at 05:18

## 2021-12-11 RX ADMIN — Medication 10 ML: at 14:51

## 2021-12-11 RX ADMIN — AZITHROMYCIN MONOHYDRATE 500 MG: 250 TABLET ORAL at 09:51

## 2021-12-11 RX ADMIN — Medication 10 ML: at 05:19

## 2021-12-11 RX ADMIN — Medication 10 ML: at 21:58

## 2021-12-11 RX ADMIN — FAMOTIDINE 40 MG: 20 TABLET ORAL at 09:51

## 2021-12-11 RX ADMIN — EZETIMIBE 10 MG: 10 TABLET ORAL at 09:51

## 2021-12-11 RX ADMIN — CARVEDILOL 12.5 MG: 3.12 TABLET, FILM COATED ORAL at 09:51

## 2021-12-11 RX ADMIN — LEVOTHYROXINE SODIUM 150 MCG: 0.05 TABLET ORAL at 05:18

## 2021-12-11 RX ADMIN — CEFTRIAXONE 1 G: 1 INJECTION, POWDER, FOR SOLUTION INTRAMUSCULAR; INTRAVENOUS at 09:51

## 2021-12-11 NOTE — PROGRESS NOTES
Problem: Pressure Injury - Risk of  Goal: *Prevention of pressure injury  Description: Document Fredo Scale and appropriate interventions in the flowsheet. Outcome: Progressing Towards Goal  Note: Pressure Injury Interventions:  Sensory Interventions: Assess changes in LOC, Check visual cues for pain, Keep linens dry and wrinkle-free, Maintain/enhance activity level, Minimize linen layers, Pressure redistribution bed/mattress (bed type), Turn and reposition approx. every two hours (pillows and wedges if needed)    Moisture Interventions: Absorbent underpads, Check for incontinence Q2 hours and as needed, Internal/External urinary devices, Maintain skin hydration (lotion/cream)    Activity Interventions: Increase time out of bed, Pressure redistribution bed/mattress(bed type), PT/OT evaluation    Mobility Interventions: Pressure redistribution bed/mattress (bed type), PT/OT evaluation    Nutrition Interventions: Document food/fluid/supplement intake, Offer support with meals,snacks and hydration                     Problem: Falls - Risk of  Goal: *Absence of Falls  Description: Document Lakshmi Fall Risk and appropriate interventions in the flowsheet.   Outcome: Progressing Towards Goal  Note: Fall Risk Interventions:  Mobility Interventions: Bed/chair exit alarm, Patient to call before getting OOB, PT Consult for mobility concerns    Mentation Interventions: Bed/chair exit alarm, Adequate sleep, hydration, pain control, More frequent rounding, Reorient patient, Toileting rounds, Update white board    Medication Interventions: Bed/chair exit alarm, Patient to call before getting OOB, Teach patient to arise slowly    Elimination Interventions: Bed/chair exit alarm, Call light in reach, Patient to call for help with toileting needs, Toileting schedule/hourly rounds

## 2021-12-11 NOTE — PROGRESS NOTES
Received pt from [facility]) in stable condition. Pt in bed resting quietly. Resp even & unlabored on room air; no acute signs of distress noted. Alarm active & audible. Bed low & locked; call light in reach; no needs voiced.

## 2021-12-11 NOTE — PROGRESS NOTES
Hospitalist Progress Note   Admit Date:  2021  4:54 PM   Name:  Charlie Colón   Age:  77 y.o. Sex:  female  :  1955   MRN:  [id number]   Room:  [facility]    Presenting Complaint: Fall    Reason(s) for Admission: CAP (community acquired pneumonia) [J18.9]     Hospital Course & Interval History:     Please refer to the admission H&P for details of presentation. In summary, Charlie Colón is a 77 y.o. female with medical history significant for hypertension, hyperlipidemia, hypothyroidism who was admitted due to new onset of confusion that has worsened over the last several days. Patient was noted to have worsening right lower lobe infiltrate compared to chest x-ray from 2021. Patient was noted to have acute metabolic encephalopathy but CT head showed no evidence of acute cranial hemorrhage or abnormalities      Subjective/24 hr Events (21) : Patient is seen and examined at bedside. No acute events reported overnight by nursing staff. Alert, awake and oriented x3 today. Patient denies any any cough, fever, chills, chest pains, shortness of breath, n/v, abdominal pain. Review of Systems: 10 point review of systems is otherwise negative with the exception of the elements mentioned above. Assessment & Plan:     RLL Pneumonia   Chest x-ray noted to have worsening right lower lobe infiltrate compared to x-ray from .  -Currently on room air without any signs of hypoxia. Monitor and supplement oxygen as needed  -On ceftriaxone azithromycin    Acute Metabolic encephalopathy - improving  CT head negative for any acute intracranial abnormalities. Likely due to ongoing underlying infection  - treat underlying infection as above    NIKKI  Creatinine noted to be elevated 2.10 with prior creatinine of 1.05 yesterday. Possibly due to dehydration as patient has been confused and not taking anything p.o. much.   -IV fluids started    Acquired hypothyroidism : synthroid    Essential hypertension:  - carvedilol, amlodipine. - hold  lisinopril-hydrochlorothiazide due to NIKKI    Mixed hyperlipidemia : On atorvastatin and ezetimibe      Diet:  ADULT DIET Regular; Low Fat/Low Chol/High Fiber/GISELLE  DVT PPx: lovenox  Code status: Full Code    Hospital Problems as of 12/11/2021 Date Reviewed: 12/9/2021          Codes Class Noted - Resolved POA    Acute metabolic encephalopathy I-17-TZ: G93.41  ICD-9-CM: 348.31  12/10/2021 - Present Yes        Delirium ICD-10-CM: R41.0  ICD-9-CM: 780.09  12/9/2021 - Present Yes        * (Principal) RLL pneumonia ICD-10-CM: J18.9  ICD-9-CM: 834  11/30/2021 - Present Yes        Essential hypertension ICD-10-CM: I10  ICD-9-CM: 401.9  1/4/2017 - Present Yes        Acquired hypothyroidism ICD-10-CM: E03.9  ICD-9-CM: 244.9  10/5/2016 - Present Yes        Mixed hyperlipidemia ICD-10-CM: E78.2  ICD-9-CM: 272.2  10/5/2016 - Present Yes        NIKKI (acute kidney injury) ([facility] ICD-10-CM: N17.9  ICD-9-CM: 584.9  11/7/2015 - Present Unknown              Objective:     Patient Vitals for the past 24 hrs:   Temp Pulse Resp BP SpO2   12/11/21 1145 -- -- -- 101/61 --   12/11/21 1135 98.7 °F (37.1 °C) 75 18 (!) 87/53 98 %   12/11/21 0808 99.1 °F (37.3 °C) 79 18 112/86 97 %   12/11/21 0449 98.7 °F (37.1 °C) 78 16 120/69 99 %   12/11/21 0014 98.9 °F (37.2 °C) 72 16 115/66 98 %   12/10/21 1858 98.9 °F (37.2 °C) 74 19 (!) 105/59 100 %     Oxygen Therapy  O2 Sat (%): 98 % (12/11/21 1135)  Pulse via Oximetry: 65 beats per minute (12/09/21 0314)  O2 Device: None (Room air) (12/11/21 1135)    Estimated body mass index is 24.74 kg/m² as calculated from the following:    Height as of this encounter: 5' 10\" (1.778 m). Weight as of this encounter: 78.2 kg (172 lb 6.4 oz).     Intake/Output Summary (Last 24 hours) at 12/11/2021 1558  Last data filed at 12/11/2021 1135  Gross per 24 hour   Intake 150 ml   Output 800 ml   Net -650 ml         Physical Exam:     Blood pressure 101/61, pulse 75, temperature 98.7 °F (37.1 °C), resp. rate 18, height 5' 10\" (1.778 m), weight 78.2 kg (172 lb 6.4 oz), SpO2 98 %. General:    Well nourished. No overt distress, lethargic  Head:  Normocephalic, atraumatic  Eyes:  Sclerae appear normal.  Pupils equally round. ENT:  Nares appear normal, no drainage. Moist oral mucosa  Neck:  No restricted ROM. Trachea midline   CV:   RRR. No m/r/g. No jugular venous distension. Lungs:   Scattered crackles. No wheezing. Respirations even, unlabored  Abdomen: Bowel sounds present. Soft, nontender, nondistended. Extremities: No cyanosis or clubbing. No edema  Skin:     No rashes and normal coloration. Warm and dry. Neuro:  CN II-XII grossly intact. A&Ox2  Psych:  Normal mood and affect.       I have reviewed ordered lab tests and independently visualized imaging below:    Recent Labs:  Recent Results (from the past 48 hour(s))   CBC W/O DIFF    Collection Time: 12/10/21  5:29 AM   Result Value Ref Range    WBC 4.4 4.3 - 11.1 K/uL    RBC 2.75 (L) 4.05 - 5.2 M/uL    HGB 9.1 (L) 11.7 - 15.4 g/dL    HCT 27.6 (L) 35.8 - 46.3 %    .4 (H) 79.6 - 97.8 FL    MCH 33.1 (H) 26.1 - 32.9 PG    MCHC 33.0 31.4 - 35.0 g/dL    RDW 12.4 11.9 - 14.6 %    PLATELET 675 324 - 999 K/uL    MPV 9.2 (L) 9.4 - 12.3 FL    ABSOLUTE NRBC 0.00 0.0 - 0.2 K/uL   METABOLIC PANEL, BASIC    Collection Time: 12/10/21  5:29 AM   Result Value Ref Range    Sodium 140 136 - 145 mmol/L    Potassium 3.3 (L) 3.5 - 5.1 mmol/L    Chloride 104 98 - 107 mmol/L    CO2 29 21 - 32 mmol/L    Anion gap 7 7 - 16 mmol/L    Glucose 84 65 - 100 mg/dL    BUN 16 8 - 23 MG/DL    Creatinine 1.05 (H) 0.6 - 1.0 MG/DL    GFR est AA >60 >60 ml/min/1.73m2    GFR est non-AA 56 (L) >60 ml/min/1.73m2    Calcium 9.5 8.3 - 10.4 MG/DL   CBC W/O DIFF    Collection Time: 12/11/21  3:54 AM   Result Value Ref Range    WBC 3.9 (L) 4.3 - 11.1 K/uL    RBC 2.35 (L) 4.05 - 5.2 M/uL    HGB 8.0 (L) 11.7 - 15.4 g/dL    HCT 24.0 (L) 35.8 - 46.3 %    .1 (H) 79.6 - 97.8 FL    MCH 34.0 (H) 26.1 - 32.9 PG    MCHC 33.3 31.4 - 35.0 g/dL    RDW 12.6 11.9 - 14.6 %    PLATELET 373 172 - 841 K/uL    MPV 9.2 (L) 9.4 - 12.3 FL    ABSOLUTE NRBC 0.00 0.0 - 0.2 K/uL   METABOLIC PANEL, BASIC    Collection Time: 12/11/21  3:54 AM   Result Value Ref Range    Sodium 138 136 - 145 mmol/L    Potassium 3.3 (L) 3.5 - 5.1 mmol/L    Chloride 105 98 - 107 mmol/L    CO2 26 21 - 32 mmol/L    Anion gap 7 7 - 16 mmol/L    Glucose 89 65 - 100 mg/dL    BUN 33 (H) 8 - 23 MG/DL    Creatinine 2.10 (H) 0.6 - 1.0 MG/DL    GFR est AA 30 (L) >60 ml/min/1.73m2    GFR est non-AA 25 (L) >60 ml/min/1.73m2    Calcium 9.1 8.3 - 10.4 MG/DL   VANCOMYCIN, RANDOM    Collection Time: 12/11/21  3:54 AM   Result Value Ref Range    Vancomycin, random 17.1 UG/ML       All Micro Results     Procedure Component Value Units Date/Time    CULTURE, BLOOD [[id number]] Collected: 12/09/21 0136    Order Status: Completed Specimen: Blood Updated: 12/11/21 0832     Special Requests: --        RIGHT  HAND       Culture result: NO GROWTH 2 DAYS       CULTURE, BLOOD [[id number]] Collected: 12/09/21 0426    Order Status: Completed Specimen: Blood Updated: 12/11/21 0832     Special Requests: --        RIGHT  ARM       Culture result: NO GROWTH 2 DAYS       COVID-19 RAPID TEST [[id number]] Collected: 12/09/21 0136    Order Status: Completed Specimen: Nasopharyngeal Updated: 12/09/21 0223     Specimen source NASAL        COVID-19 rapid test Not detected        Comment:      The specimen is NEGATIVE for SARS-CoV-2, the novel coronavirus associated with COVID-19. A negative result does not rule out COVID-19. This test has been authorized by the FDA under an Emergency Use Authorization (EUA) for use by authorized laboratories.         Fact sheet for Healthcare Providers: ConventionUpdate.co.nz  Fact sheet for Patients: ConventionUpdate.co.nz       Methodology: Isothermal Nucleic Acid Amplification               Other Studies:  No results found. Current Meds:  Current Facility-Administered Medications   Medication Dose Route Frequency    0.9% sodium chloride infusion  75 mL/hr IntraVENous CONTINUOUS    cefTRIAXone (ROCEPHIN) 1 g in 0.9% sodium chloride (MBP/ADV) 50 mL MBP  1 g IntraVENous Q24H    atorvastatin (LIPITOR) tablet 80 mg  80 mg Oral DAILY    carvediloL (COREG) tablet 12.5 mg  12.5 mg Oral DAILY    cholecalciferol (VITAMIN D3) (1000 Units /25 mcg) tablet 4,000 Units  4,000 Units Oral DAILY    famotidine (PEPCID) tablet 40 mg  40 mg Oral DAILY    ezetimibe (ZETIA) tablet 10 mg  10 mg Oral DAILY    amLODIPine (NORVASC) tablet 5 mg  5 mg Oral DAILY    levothyroxine (SYNTHROID) tablet 150 mcg  150 mcg Oral ACB    [Held by provider] lisinopril-hydroCHLOROthiazide (PRINZIDE, ZESTORETIC) 20-12.5 mg per tablet 1 Tablet  1 Tablet Oral BID    sertraline (ZOLOFT) tablet 150 mg  150 mg Oral DAILY    sodium chloride (NS) flush 5-40 mL  5-40 mL IntraVENous Q8H    sodium chloride (NS) flush 5-40 mL  5-40 mL IntraVENous PRN    enoxaparin (LOVENOX) injection 40 mg  40 mg SubCUTAneous Q24H    azithromycin (ZITHROMAX) tablet 500 mg  500 mg Oral DAILY    LORazepam (ATIVAN) injection 1 mg  1 mg IntraVENous Q6H PRN    sodium chloride (NS) flush 5-10 mL  5-10 mL IntraVENous Q8H    sodium chloride (NS) flush 5-10 mL  5-10 mL IntraVENous PRN       Signed:  Neo Herrera MD    Part of this note may have been written by using a voice dictation software. The note has been proof read but may still contain some grammatical/other typographical errors.

## 2021-12-12 LAB
ANION GAP SERPL CALC-SCNC: 6 MMOL/L (ref 7–16)
BUN SERPL-MCNC: 29 MG/DL (ref 8–23)
CALCIUM SERPL-MCNC: 9.2 MG/DL (ref 8.3–10.4)
CHLORIDE SERPL-SCNC: 109 MMOL/L (ref 98–107)
CO2 SERPL-SCNC: 25 MMOL/L (ref 21–32)
CREAT SERPL-MCNC: 1.55 MG/DL (ref 0.6–1)
ERYTHROCYTE [DISTWIDTH] IN BLOOD BY AUTOMATED COUNT: 12.5 % (ref 11.9–14.6)
FERRITIN SERPL-MCNC: 1447 NG/ML (ref 8–388)
FOLATE SERPL-MCNC: 5.4 NG/ML (ref 3.1–17.5)
GLUCOSE SERPL-MCNC: 89 MG/DL (ref 65–100)
HCT VFR BLD AUTO: 22.9 % (ref 35.8–46.3)
HGB BLD-MCNC: 7.5 G/DL (ref 11.7–15.4)
IRON SATN MFR SERPL: 25 %
IRON SERPL-MCNC: 58 UG/DL (ref 35–150)
MAGNESIUM SERPL-MCNC: 2.1 MG/DL (ref 1.8–2.4)
MCH RBC QN AUTO: 33.2 PG (ref 26.1–32.9)
MCHC RBC AUTO-ENTMCNC: 32.8 G/DL (ref 31.4–35)
MCV RBC AUTO: 101.3 FL (ref 79.6–97.8)
NRBC # BLD: 0 K/UL (ref 0–0.2)
PLATELET # BLD AUTO: 218 K/UL (ref 150–450)
PMV BLD AUTO: 8.9 FL (ref 9.4–12.3)
POTASSIUM SERPL-SCNC: 3.3 MMOL/L (ref 3.5–5.1)
RBC # BLD AUTO: 2.26 M/UL (ref 4.05–5.2)
SODIUM SERPL-SCNC: 140 MMOL/L (ref 136–145)
TIBC SERPL-MCNC: 230 UG/DL (ref 250–450)
VIT B12 SERPL-MCNC: 709 PG/ML (ref 193–986)
WBC # BLD AUTO: 4.4 K/UL (ref 4.3–11.1)

## 2021-12-12 PROCEDURE — 83540 ASSAY OF IRON: CPT

## 2021-12-12 PROCEDURE — 65270000029 HC RM PRIVATE

## 2021-12-12 PROCEDURE — 83735 ASSAY OF MAGNESIUM: CPT

## 2021-12-12 PROCEDURE — 82728 ASSAY OF FERRITIN: CPT

## 2021-12-12 PROCEDURE — 74011250637 HC RX REV CODE- 250/637: Performed by: INTERNAL MEDICINE

## 2021-12-12 PROCEDURE — 85027 COMPLETE CBC AUTOMATED: CPT

## 2021-12-12 PROCEDURE — 97530 THERAPEUTIC ACTIVITIES: CPT

## 2021-12-12 PROCEDURE — 36415 COLL VENOUS BLD VENIPUNCTURE: CPT

## 2021-12-12 PROCEDURE — 74011250636 HC RX REV CODE- 250/636: Performed by: EMERGENCY MEDICINE

## 2021-12-12 PROCEDURE — 82607 VITAMIN B-12: CPT

## 2021-12-12 PROCEDURE — 82746 ASSAY OF FOLIC ACID SERUM: CPT

## 2021-12-12 PROCEDURE — 74011250637 HC RX REV CODE- 250/637: Performed by: EMERGENCY MEDICINE

## 2021-12-12 PROCEDURE — 80048 BASIC METABOLIC PNL TOTAL CA: CPT

## 2021-12-12 PROCEDURE — 97162 PT EVAL MOD COMPLEX 30 MIN: CPT

## 2021-12-12 PROCEDURE — 74011000258 HC RX REV CODE- 258: Performed by: EMERGENCY MEDICINE

## 2021-12-12 RX ORDER — POTASSIUM CHLORIDE 20 MEQ/1
40 TABLET, EXTENDED RELEASE ORAL
Status: COMPLETED | OUTPATIENT
Start: 2021-12-12 | End: 2021-12-12

## 2021-12-12 RX ADMIN — SERTRALINE 150 MG: 100 TABLET, FILM COATED ORAL at 09:47

## 2021-12-12 RX ADMIN — AZITHROMYCIN MONOHYDRATE 500 MG: 250 TABLET ORAL at 09:47

## 2021-12-12 RX ADMIN — VITAMIN D, TAB 1000IU (100/BT) 4000 UNITS: 25 TAB at 09:49

## 2021-12-12 RX ADMIN — AMLODIPINE BESYLATE 5 MG: 10 TABLET ORAL at 09:47

## 2021-12-12 RX ADMIN — ENOXAPARIN SODIUM 40 MG: 100 INJECTION SUBCUTANEOUS at 09:46

## 2021-12-12 RX ADMIN — CARVEDILOL 12.5 MG: 3.12 TABLET, FILM COATED ORAL at 09:47

## 2021-12-12 RX ADMIN — CEFTRIAXONE 1 G: 1 INJECTION, POWDER, FOR SOLUTION INTRAMUSCULAR; INTRAVENOUS at 09:46

## 2021-12-12 RX ADMIN — POTASSIUM CHLORIDE 40 MEQ: 20 TABLET, EXTENDED RELEASE ORAL at 15:51

## 2021-12-12 RX ADMIN — ATORVASTATIN CALCIUM 80 MG: 40 TABLET, FILM COATED ORAL at 09:47

## 2021-12-12 RX ADMIN — FAMOTIDINE 40 MG: 20 TABLET ORAL at 09:50

## 2021-12-12 RX ADMIN — EZETIMIBE 10 MG: 10 TABLET ORAL at 09:47

## 2021-12-12 RX ADMIN — Medication 10 ML: at 06:19

## 2021-12-12 RX ADMIN — LEVOTHYROXINE SODIUM 150 MCG: 0.05 TABLET ORAL at 06:18

## 2021-12-12 NOTE — PROGRESS NOTES
Hospitalist Progress Note   Admit Date:  2021  4:54 PM   Name:  Nelsy Ruiz   Age:  77 y.o. Sex:  female  :  1955   MRN:  [id number]   Room:  [facility]    Presenting Complaint: Fall    Reason(s) for Admission: CAP (community acquired pneumonia) [J18.9]     Hospital Course & Interval History:     Please refer to the admission H&P for details of presentation. In summary, Nelsy Ruiz is a 77 y.o. female with medical history significant for hypertension, hyperlipidemia, hypothyroidism who was admitted due to new onset of confusion that has worsened over the last several days. Patient was noted to have worsening right lower lobe infiltrate compared to chest x-ray from 2021. Patient was noted to have acute metabolic encephalopathy but CT head showed no evidence of acute cranial hemorrhage or abnormalities      Subjective/24 hr Events (21) : Patient is seen and examined at bedside. No acute events reported overnight by nursing staff. Alert, awake and oriented x2 today. Patient denies any any cough, fever, chills, chest pains, shortness of breath, n/v, abdominal pain. Review of Systems: 10 point review of systems is otherwise negative with the exception of the elements mentioned above. Assessment & Plan:     RLL Pneumonia   Chest x-ray noted to have worsening right lower lobe infiltrate compared to x-ray from .  -Currently on room air without any signs of hypoxia. Monitor and supplement oxygen as needed  -On ceftriaxone azithromycin    Acute Metabolic encephalopathy - improving  CT head negative for any acute intracranial abnormalities. Likely due to ongoing underlying infection  - treat underlying infection as above    NIKKI  Creatinine noted to be elevated 2.10 with prior creatinine of 1.05 yesterday. Possibly due to dehydration as patient has been confused and not taking anything p.o. much.   -IV fluids started    Acquired hypothyroidism : synthroid    Essential hypertension:  - carvedilol, amlodipine. - hold  lisinopril-hydrochlorothiazide due to NIKKI    Mixed hyperlipidemia : On atorvastatin and ezetimibe      Diet:  ADULT DIET Regular; Low Fat/Low Chol/High Fiber/GISELLE  DVT PPx: lovenox  Code status: Full Code     Called and discussed with Son. Son states patient has been confused intermittently for quite some time but never been diagnosed with dementia. He also would like patient to go to SNF if she needs to as she has been requiring more help at home.      Hospital Problems as of 12/12/2021 Date Reviewed: 12/9/2021          Codes Class Noted - Resolved POA    Acute metabolic encephalopathy WTX-62-NG: G93.41  ICD-9-CM: 348.31  12/10/2021 - Present Yes        Delirium ICD-10-CM: R41.0  ICD-9-CM: 780.09  12/9/2021 - Present Yes        * (Principal) RLL pneumonia ICD-10-CM: J18.9  ICD-9-CM: 106  11/30/2021 - Present Yes        Essential hypertension ICD-10-CM: I10  ICD-9-CM: 401.9  1/4/2017 - Present Yes        Acquired hypothyroidism ICD-10-CM: E03.9  ICD-9-CM: 244.9  10/5/2016 - Present Yes        Macrocytic anemia ICD-10-CM: D53.9  ICD-9-CM: 281.9  10/5/2016 - Present Yes        Mixed hyperlipidemia ICD-10-CM: E78.2  ICD-9-CM: 272.2  10/5/2016 - Present Yes        [facility]KKI [facility]acute [facility] ([facility] ICD-10-CM: N17.9  ICD-9-CM: 584.9  11/7/2015 - Present No              Objective:     Patient Vitals for the past 24 hrs:   Temp Pulse Resp BP SpO2   12/12/21 1132 98.4 °F (36.9 °C) 74 20 (!) 140/76 98 %   12/12/21 0742 98.4 °F (36.9 °C) 68 18 138/70 100 %   12/12/21 0359 98.2 °F (36.8 °C) 69 17 (!) 144/74 99 %   12/11/21 2315 98.7 °F (37.1 °C) 70 17 (!) 144/71 99 %   12/11/21 2000 98.3 °F (36.8 °C) 67 18 126/67 98 %   12/11/21 1655 98.7 °F (37.1 °C) 68 18 110/65 99 %     Oxygen Therapy  O2 Sat (%): 98 % (12/12/21 1132)  Pulse via Oximetry: 65 beats per minute (12/09/21 0314)  O2 Device: None (Room air) (12/12/21 1132)    Estimated body mass index is 24.74 kg/m² as calculated from the following:    Height as of this encounter: 5' 10\" (1.778 m). Weight as of this encounter: 78.2 kg (172 lb 6.4 oz). Intake/Output Summary (Last 24 hours) at 12/12/2021 1424  Last data filed at 12/12/2021 1132  Gross per 24 hour   Intake 1125 ml   Output 1630 ml   Net -505 ml         Physical Exam:     Blood pressure (!) 140/76, pulse 74, temperature 98.4 °F (36.9 °C), resp. rate 20, height 5' 10\" (1.778 m), weight 78.2 kg (172 lb 6.4 oz), SpO2 98 %. General:    Well nourished. No overt distress, lethargic  Head:  Normocephalic, atraumatic  Eyes:  Sclerae appear normal.  Pupils equally round. ENT:  Nares appear normal, no drainage. Moist oral mucosa  Neck:  No restricted ROM. Trachea midline   CV:   RRR. No m/r/g. No jugular venous distension. Lungs:   Scattered crackles. No wheezing. Respirations even, unlabored  Abdomen: Bowel sounds present. Soft, nontender, nondistended. Extremities: No cyanosis or clubbing. No edema  Skin:     No rashes and normal coloration. Warm and dry. Neuro:  CN II-XII grossly intact. A&Ox2  Psych:  Normal mood and affect.       I have reviewed ordered lab tests and independently visualized imaging below:    Recent Labs:  Recent Results (from the past 48 hour(s))   CBC W/O DIFF    Collection Time: 12/11/21  3:54 AM   Result Value Ref Range    WBC 3.9 (L) 4.3 - 11.1 K/uL    RBC 2.35 (L) 4.05 - 5.2 M/uL    HGB 8.0 (L) 11.7 - 15.4 g/dL    HCT 24.0 (L) 35.8 - 46.3 %    .1 (H) 79.6 - 97.8 FL    MCH 34.0 (H) 26.1 - 32.9 PG    MCHC 33.3 31.4 - 35.0 g/dL    RDW 12.6 11.9 - 14.6 %    PLATELET 886 808 - 366 K/uL    MPV 9.2 (L) 9.4 - 12.3 FL    ABSOLUTE NRBC 0.00 0.0 - 0.2 K/uL   METABOLIC PANEL, BASIC    Collection Time: 12/11/21  3:54 AM   Result Value Ref Range    Sodium 138 136 - 145 mmol/L    Potassium 3.3 (L) 3.5 - 5.1 mmol/L    Chloride 105 98 - 107 mmol/L    CO2 26 21 - 32 mmol/L    Anion gap 7 7 - 16 mmol/L    Glucose 89 65 - 100 mg/dL    BUN 33 (H) 8 - 23 MG/DL    Creatinine 2.10 (H) 0.6 - 1.0 MG/DL    GFR est AA 30 (L) >60 ml/min/1.73m2    GFR est non-AA 25 (L) >60 ml/min/1.73m2    Calcium 9.1 8.3 - 10.4 MG/DL   VANCOMYCIN, RANDOM    Collection Time: 12/11/21  3:54 AM   Result Value Ref Range    Vancomycin, random 17.1 UG/ML   CBC W/O DIFF    Collection Time: 12/12/21  6:00 AM   Result Value Ref Range    WBC 4.4 4.3 - 11.1 K/uL    RBC 2.26 (L) 4.05 - 5.2 M/uL    HGB 7.5 (L) 11.7 - 15.4 g/dL    HCT 22.9 (L) 35.8 - 46.3 %    .3 (H) 79.6 - 97.8 FL    MCH 33.2 (H) 26.1 - 32.9 PG    MCHC 32.8 31.4 - 35.0 g/dL    RDW 12.5 11.9 - 14.6 %    PLATELET 945 712 - 085 K/uL    MPV 8.9 (L) 9.4 - 12.3 FL    ABSOLUTE NRBC 0.00 0.0 - 0.2 K/uL   METABOLIC PANEL, BASIC    Collection Time: 12/12/21  6:00 AM   Result Value Ref Range    Sodium 140 136 - 145 mmol/L    Potassium 3.3 (L) 3.5 - 5.1 mmol/L    Chloride 109 (H) 98 - 107 mmol/L    CO2 25 21 - 32 mmol/L    Anion gap 6 (L) 7 - 16 mmol/L    Glucose 89 65 - 100 mg/dL    BUN 29 (H) 8 - 23 MG/DL    Creatinine 1.55 (H) 0.6 - 1.0 MG/DL    GFR est AA 43 (L) >60 ml/min/1.73m2    GFR est non-AA 35 (L) >60 ml/min/1.73m2    Calcium 9.2 8.3 - 10.4 MG/DL   VITAMIN B12    Collection Time: 12/12/21  6:00 AM   Result Value Ref Range    Vitamin B12 709 193 - 986 pg/mL   FERRITIN    Collection Time: 12/12/21  6:00 AM   Result Value Ref Range    Ferritin 1,447 (H) 8 - 388 NG/ML   TRANSFERRIN SATURATION    Collection Time: 12/12/21  6:00 AM   Result Value Ref Range    Iron 58 35 - 150 ug/dL    TIBC 230 (L) 250 - 450 ug/dL    Transferrin Saturation 25 >20 %   FOLATE    Collection Time: 12/12/21  6:00 AM   Result Value Ref Range    Folate 5.4 3.1 - 17.5 ng/mL       All Micro Results     Procedure Component Value Units Date/Time    CULTURE, BLOOD [[id number]] Collected: 12/09/21 0136    Order Status: Completed Specimen: Blood Updated: 12/12/21 2578     Special Requests: --        RIGHT  HAND       Culture result: NO GROWTH 3 DAYS       CULTURE, BLOOD [[id number]] Collected: 12/09/21 0426    Order Status: Completed Specimen: Blood Updated: 12/12/21 0621     Special Requests: --        RIGHT  ARM       Culture result: NO GROWTH 3 DAYS       COVID-19 RAPID TEST [[id number]] Collected: 12/09/21 0136    Order Status: Completed Specimen: Nasopharyngeal Updated: 12/09/21 0223     Specimen source NASAL        COVID-19 rapid test Not detected        Comment:      The specimen is NEGATIVE for SARS-CoV-2, the novel coronavirus associated with COVID-19. A negative result does not rule out COVID-19. This test has been authorized by the FDA under an Emergency Use Authorization (EUA) for use by authorized laboratories. Fact sheet for Healthcare Providers: ConventionUpdate.co.nz  Fact sheet for Patients: ConventionUpdate.co.nz       Methodology: Isothermal Nucleic Acid Amplification               Other Studies:  No results found.     Current Meds:  Current Facility-Administered Medications   Medication Dose Route Frequency    0.9% sodium chloride infusion  75 mL/hr IntraVENous CONTINUOUS    cefTRIAXone (ROCEPHIN) 1 g in 0.9% sodium chloride (MBP/ADV) 50 mL MBP  1 g IntraVENous Q24H    atorvastatin (LIPITOR) tablet 80 mg  80 mg Oral DAILY    carvediloL (COREG) tablet 12.5 mg  12.5 mg Oral DAILY    cholecalciferol (VITAMIN D3) (1000 Units /25 mcg) tablet 4,000 Units  4,000 Units Oral DAILY    famotidine (PEPCID) tablet 40 mg  40 mg Oral DAILY    ezetimibe (ZETIA) tablet 10 mg  10 mg Oral DAILY    amLODIPine (NORVASC) tablet 5 mg  5 mg Oral DAILY    levothyroxine (SYNTHROID) tablet 150 mcg  150 mcg Oral ACB    [Held by provider] lisinopril-hydroCHLOROthiazide (PRINZIDE, ZESTORETIC) 20-12.5 mg per tablet 1 Tablet  1 Tablet Oral BID    sertraline (ZOLOFT) tablet 150 mg  150 mg Oral DAILY    sodium chloride (NS) flush 5-40 mL  5-40 mL IntraVENous Q8H    sodium chloride (NS) flush 5-40 mL 5-40 mL IntraVENous PRN    enoxaparin (LOVENOX) injection 40 mg  40 mg SubCUTAneous Q24H    azithromycin (ZITHROMAX) tablet 500 mg  500 mg Oral DAILY    LORazepam (ATIVAN) injection 1 mg  1 mg IntraVENous Q6H PRN    sodium chloride (NS) flush 5-10 mL  5-10 mL IntraVENous Q8H    sodium chloride (NS) flush 5-10 mL  5-10 mL IntraVENous PRN       Signed:  Carmelita Christianson MD    Part of this note may have been written by using a voice dictation software. The note has been proof read but may still contain some grammatical/other typographical errors.

## 2021-12-12 NOTE — PROGRESS NOTES
Problem: Pressure Injury - Risk of  Goal: *Prevention of pressure injury  Description: Document Fredo Scale and appropriate interventions in the flowsheet.   Outcome: Progressing Towards Goal  Note: Pressure Injury Interventions:  Sensory Interventions: Assess changes in LOC    Moisture Interventions: Internal/External urinary devices    Activity Interventions: Increase time out of bed    Mobility Interventions: Pressure redistribution bed/mattress (bed type)    Nutrition Interventions: Document food/fluid/supplement intake    Friction and Shear Interventions: HOB 30 degrees or less

## 2021-12-12 NOTE — PROGRESS NOTES
ACUTE PHYSICAL THERAPY GOALS:  (Developed with and agreed upon by patient and/or caregiver. )  LTG:  (1.)Ms. Sobeida Alvarenga will move from supine to sit and sit to supine , scoot up and down and roll side to side in bed with INDEPENDENCE within 7 treatment day(s). (2.)Ms. Sobeida Alvarenga will transfer from bed to chair and chair to bed with SUPERVISION using the least restrictive device within 7 treatment day(s). (3.)Ms. Sobeida Alvarenga will ambulate with SUPERVISION for 200 feet with the least restrictive device within 7 treatment day(s). (4.)Ms. Sobeida Alvarenga will perform exercises per HEP in sitting and/or standing to improve strength and mobility within 7 days. (5.)Ms. Sobeida Alvarenga will ascend and descend 4 steps with CONTACT GUARD ASSIST using handrail(s) within 7 days.   ________________________________________________________________________________________________      PHYSICAL THERAPY ASSESSMENT: Initial Assessment and PM PT Treatment Day # 1      Rodney Srinivasan is a 77 y.o. female   PRIMARY DIAGNOSIS: RLL pneumonia  CAP (community acquired pneumonia) [J18.9]       Reason for Referral:  Weakness, pneumonia, AMS  ICD-10: Treatment Diagnosis: Generalized Muscle Weakness (M62.81)  Difficulty in walking, Not elsewhere classified (R26.2)  Other abnormalities of gait and mobility (R26.89)  INPATIENT: Payor: [facility] MEDICARE COMPLETE / Plan: [facility] MEDICARE COMPLETE / Product Type: Managed Care Medicare /     ASSESSMENT:     REHAB RECOMMENDATIONS:   Recommendation to date pending progress:  Setting:   Short-term Rehab  Equipment:    To Be Determined     PRIOR LEVEL OF FUNCTION:  (Prior to Hospitalization) INITIAL/CURRENT LEVEL OF FUNCTION:  (Most Recently Demonstrated)   Bed Mobility:   Independent  Sit to Stand:   Modified Independent  Transfers:   Modified Independent  Gait/Mobility:   Modified Independent Bed Mobility:   Minimal Assistance  Sit to Stand:   Minimal Assistance  Transfers:   Minimal Assistance  Gait/Mobility:   Minimal Assistance     ASSESSMENT:  Ms. Rachelle Martinez is admitted from home with weakness, fall, and altered mental status with recent UTI. She presents with confusion and delayed/inconsistent responses and answers to questions and seems to be a poor historian. Unclear PLOF- based on conversation it seems pt lives alone, [facility], and was ambulating with walker during therapy. She needs max additional time and cueing for all mobility. Min assist for bed mobility, seated activities, and sit-stand transfers. Ambulation in room and hallway with very slow pace, small short steps, and heavy cues for posture, walker management, and gait safety. Returned to room and back to supine after session with needs in reach, bed alarm on for safety. Ms. Rachelle Martinez seems to be functioning well below baseline with strength and mobility and appears very confused today. May need rehab at [facility]. SUBJECTIVE:   Ms. Rachelle Martinez states, \"I don't know\"    SOCIAL HISTORY/LIVING ENVIRONMENT: unclear PLOF/home environment. Based on our conversation it seems like pt lives alone (son recently moved out), has home therapy, and ambulates with walker. She tells me she is able to fix meals and is independent with aDLs.    Home Environment: Private residence  # Steps to Enter: 3  One/Two Story Residence: One story  Living Alone: Yes  Support Systems: Child(annamaria)  OBJECTIVE:     PAIN: VITAL SIGNS: LINES/DRAINS:   Pre Treatment: Pain Screen  Pain Scale 1: Numeric (0 - 10)  Pain Intensity 1: 0  Post Treatment: 0/10 Vital Signs  O2 Device: None (Room air) Purewick  O2 Device: None (Room air)     GROSS EVALUATION:   Within Functional Limits Abnormal/ Functional Abnormal/ Non-Functional (see comments) Not Tested Comments:   AROM [x] [] [] []    PROM [] [] [] []    Strength [] [x] [] []    Balance [] [x] [] []    Posture [] [x] [] []    Sensation [] [] [] []    Coordination [] [] [] []    Tone [] [] [] []    Edema [] [] [] []    Activity Tolerance [] [x] [] []     [] [] [] [] COGNITION/  PERCEPTION: Intact Impaired   (see comments) Comments:   Orientation [] [x]    Vision [] []    Hearing [] []    Command Following [] [x] Delayed/inconsistent   Safety Awareness [] [x]     [] []      MOBILITY: I Mod I S SBA CGA Min Mod Max Total  NT x2 Comments:   Bed Mobility    Rolling [] [] [] [] [] [x] [] [] [] [] []    Supine to Sit [] [] [] [] [] [x] [] [] [] [] []    Scooting [] [] [] [] [] [x] [] [] [] [] []    Sit to Supine [] [] [] [] [] [x] [] [] [] [] []    Transfers    Sit to Stand [] [] [] [] [] [x] [] [] [] [] []    Bed to Chair [] [] [] [] [] [] [] [] [] [] []    Stand to Sit [] [] [] [] [] [x] [] [] [] [] []    I=Independent, Mod I=Modified Independent, S=Supervision, SBA=Standby Assistance, CGA=Contact Guard Assistance,   Min=Minimal Assistance, Mod=Moderate Assistance, Max=Maximal Assistance, Total=Total Assistance, NT=Not Tested  GAIT: I Mod I S SBA CGA Min Mod Max Total  NT x2 Comments:   Level of Assistance [] [] [] [] [] [x] [] [] [] [] []    Distance 48'    DME Rolling Walker    Gait Quality Very slow, small steps, decreased step clearance    Weightbearing Status N/A     I=Independent, Mod I=Modified Independent, S=Supervision, SBA=Standby Assistance, CGA=Contact Guard Assistance,   Min=Minimal Assistance, Mod=Moderate Assistance, Max=Maximal Assistance, Total=Total Assistance, NT=Not Tested    [address] [address] AM-PAC 6 Clicks   Basic Mobility Inpatient Short Form       How much difficulty does the patient currently have. .. Unable A Lot A Little None   1. Turning over in bed (including adjusting bedclothes, sheets and blankets)? [] 1   [] 2   [x] 3   [] 4   2. Sitting down on and standing up from a chair with arms ( e.g., wheelchair, bedside commode, etc.)   [] 1   [] 2   [x] 3   [] 4   3. Moving from lying on back to sitting on the side of the bed? [] 1   [] 2   [x] 3   [] 4   How much help from another person does the patient currently need. ..  Total A Lot A Little None   4. Moving to and from a bed to a chair (including a wheelchair)? [] 1   [] 2   [x] 3   [] 4   5. Need to walk in hospital room? [] 1   [] 2   [x] 3   [] 4   6. Climbing 3-5 steps with a railing? [] 1   [x] 2   [] 3   [] 4   © 2007, Trustees of [address] [place] [address] [address] [address], under license to [employer]. All rights reserved     Score:  Initial: 17 Most Recent: X (Date: -- )    Interpretation of Tool:  Represents activities that are increasingly more difficult (i.e. Bed mobility, Transfers, Gait). PLAN:   FREQUENCY/DURATION: PT Plan of Care: 3 times/week for duration of hospital stay or until stated goals are met, whichever comes first.    PROBLEM LIST:   (Skilled intervention is medically necessary to address:)  1. Decreased Activity Tolerance  2. Decreased Balance  3. Decreased Cognition  4. Decreased Coordination  5. Decreased Gait Ability  6. Decreased Strength  7. Decreased Transfer Abilities   INTERVENTIONS PLANNED:   (Benefits and precautions of physical therapy have been discussed with the patient.)  1. Therapeutic Activity  2. Therapeutic Exercise/HEP  3. Neuromuscular Re-education  4. Gait Training  5. Manual Therapy  6. Education     TREATMENT:     EVALUATION: Moderate Complexity : (Untimed Charge)    TREATMENT:   ($$ Therapeutic Activity: 23-37 mins    )  Therapeutic Activity (23 Minutes): Therapeutic activity included Rolling, Supine to Sit, Sit to Supine, Scooting, Lateral Scooting, Transfer Training, Ambulation on level ground, Sitting balance , Standing balance and seated functional activities to improve functional Mobility, Strength, Activity tolerance and balance, transfer and gait safety.     TREATMENT GRID:  N/A    AFTER TREATMENT POSITION/PRECAUTIONS:  Alarm Activated, Bed, Needs within reach and RN notified    INTERDISCIPLINARY COLLABORATION:  RN/PCT and PT/PTA    TOTAL TREATMENT DURATION:  PT Patient Time In/Time Out  Time In: 1349  Time Out: [facility]

## 2021-12-13 LAB
ANION GAP SERPL CALC-SCNC: 6 MMOL/L (ref 7–16)
BUN SERPL-MCNC: 22 MG/DL (ref 8–23)
CALCIUM SERPL-MCNC: 9.6 MG/DL (ref 8.3–10.4)
CHLORIDE SERPL-SCNC: 107 MMOL/L (ref 98–107)
CO2 SERPL-SCNC: 27 MMOL/L (ref 21–32)
CREAT SERPL-MCNC: 1.17 MG/DL (ref 0.6–1)
ERYTHROCYTE [DISTWIDTH] IN BLOOD BY AUTOMATED COUNT: 12 % (ref 11.9–14.6)
GLUCOSE SERPL-MCNC: 92 MG/DL (ref 65–100)
HCT VFR BLD AUTO: 24.8 % (ref 35.8–46.3)
HGB BLD-MCNC: 8.2 G/DL (ref 11.7–15.4)
MCH RBC QN AUTO: 33.9 PG (ref 26.1–32.9)
MCHC RBC AUTO-ENTMCNC: 33.1 G/DL (ref 31.4–35)
MCV RBC AUTO: 102.5 FL (ref 79.6–97.8)
NRBC # BLD: 0 K/UL (ref 0–0.2)
PLATELET # BLD AUTO: 233 K/UL (ref 150–450)
PMV BLD AUTO: 8.8 FL (ref 9.4–12.3)
POTASSIUM SERPL-SCNC: 3.6 MMOL/L (ref 3.5–5.1)
RBC # BLD AUTO: 2.42 M/UL (ref 4.05–5.2)
SODIUM SERPL-SCNC: 140 MMOL/L (ref 136–145)
WBC # BLD AUTO: 4.2 K/UL (ref 4.3–11.1)

## 2021-12-13 PROCEDURE — 74011250636 HC RX REV CODE- 250/636: Performed by: INTERNAL MEDICINE

## 2021-12-13 PROCEDURE — 74011250637 HC RX REV CODE- 250/637: Performed by: INTERNAL MEDICINE

## 2021-12-13 PROCEDURE — 36415 COLL VENOUS BLD VENIPUNCTURE: CPT

## 2021-12-13 PROCEDURE — 74011250636 HC RX REV CODE- 250/636: Performed by: EMERGENCY MEDICINE

## 2021-12-13 PROCEDURE — 97535 SELF CARE MNGMENT TRAINING: CPT

## 2021-12-13 PROCEDURE — 80048 BASIC METABOLIC PNL TOTAL CA: CPT

## 2021-12-13 PROCEDURE — 97166 OT EVAL MOD COMPLEX 45 MIN: CPT

## 2021-12-13 PROCEDURE — 74011000258 HC RX REV CODE- 258: Performed by: EMERGENCY MEDICINE

## 2021-12-13 PROCEDURE — 74011000250 HC RX REV CODE- 250: Performed by: INTERNAL MEDICINE

## 2021-12-13 PROCEDURE — 86580 TB INTRADERMAL TEST: CPT | Performed by: INTERNAL MEDICINE

## 2021-12-13 PROCEDURE — 97530 THERAPEUTIC ACTIVITIES: CPT

## 2021-12-13 PROCEDURE — 65270000029 HC RM PRIVATE

## 2021-12-13 PROCEDURE — 74011250637 HC RX REV CODE- 250/637: Performed by: EMERGENCY MEDICINE

## 2021-12-13 PROCEDURE — 85027 COMPLETE CBC AUTOMATED: CPT

## 2021-12-13 RX ADMIN — EZETIMIBE 10 MG: 10 TABLET ORAL at 08:36

## 2021-12-13 RX ADMIN — SODIUM CHLORIDE 75 ML/HR: 900 INJECTION, SOLUTION INTRAVENOUS at 22:00

## 2021-12-13 RX ADMIN — VITAMIN D, TAB 1000IU (100/BT) 4000 UNITS: 25 TAB at 08:36

## 2021-12-13 RX ADMIN — ATORVASTATIN CALCIUM 80 MG: 40 TABLET, FILM COATED ORAL at 08:36

## 2021-12-13 RX ADMIN — CARVEDILOL 12.5 MG: 3.12 TABLET, FILM COATED ORAL at 08:36

## 2021-12-13 RX ADMIN — CEFTRIAXONE 1 G: 1 INJECTION, POWDER, FOR SOLUTION INTRAMUSCULAR; INTRAVENOUS at 08:36

## 2021-12-13 RX ADMIN — ENOXAPARIN SODIUM 40 MG: 100 INJECTION SUBCUTANEOUS at 08:37

## 2021-12-13 RX ADMIN — Medication 10 ML: at 06:00

## 2021-12-13 RX ADMIN — Medication 10 ML: at 14:55

## 2021-12-13 RX ADMIN — TUBERCULIN PURIFIED PROTEIN DERIVATIVE 5 UNITS: 5 INJECTION, SOLUTION INTRADERMAL at 11:30

## 2021-12-13 RX ADMIN — SERTRALINE 150 MG: 100 TABLET, FILM COATED ORAL at 08:36

## 2021-12-13 RX ADMIN — LEVOTHYROXINE SODIUM 150 MCG: 0.05 TABLET ORAL at 05:14

## 2021-12-13 RX ADMIN — FAMOTIDINE 40 MG: 20 TABLET ORAL at 08:35

## 2021-12-13 RX ADMIN — AMLODIPINE BESYLATE 5 MG: 10 TABLET ORAL at 08:36

## 2021-12-13 RX ADMIN — AZITHROMYCIN MONOHYDRATE 500 MG: 250 TABLET ORAL at 08:36

## 2021-12-13 NOTE — PROGRESS NOTES
Hospitalist Progress Note   Admit Date:  2021  4:54 PM   Name:  Anthony Lam   Age:  77 y.o. Sex:  female  :  1955   MRN:  [id number]   Room:  [facility]    Presenting Complaint: Fall    Reason(s) for Admission: CAP (community acquired pneumonia) [J18.9]     Hospital Course & Interval History:     Please refer to the admission H&P for details of presentation. In summary, Anthony Lam is a 77 y.o. female with medical history significant for hypertension, hyperlipidemia, hypothyroidism who was admitted due to new onset of confusion that has worsened over the last several days. Patient was noted to have worsening right lower lobe infiltrate compared to chest x-ray from 2021. Patient was noted to have acute metabolic encephalopathy but CT head showed no evidence of acute cranial hemorrhage or abnormalities      Subjective/24 hr Events (21) : Patient is seen and examined at bedside. No acute events reported overnight by nursing staff. Alert, awake and oriented x3 today. Patient denies any any cough, fever, chills, chest pains, shortness of breath, n/v, abdominal pain. Review of Systems: 10 point review of systems is otherwise negative with the exception of the elements mentioned above. Assessment & Plan:     RLL Pneumonia   Chest x-ray noted to have worsening right lower lobe infiltrate compared to x-ray from .  -Currently on room air without any signs of hypoxia. Monitor and supplement oxygen as needed  -On ceftriaxone azithromycin (EOT )    Acute Metabolic encephalopathy - improving  CT head negative for any acute intracranial abnormalities. Likely due to ongoing underlying infection  - treat underlying infection as above    NIKKI  Creatinine peaked at 2.10 and now down trending to 1.17  Possibly due to dehydration as patient has been confused and not taking anything p.o. much.   -IV fluids   -holding prinzide due to nikki    Acquired hypothyroidism : synthroid    Essential hypertension:  - carvedilol, amlodipine. - hold  lisinopril-hydrochlorothiazide due to NIKKI    Mixed hyperlipidemia : On atorvastatin and ezetimibe      Diet:  ADULT DIET Regular; Low Fat/Low Chol/High Fiber/GISELLE  DVT PPx: lovenox  Code status: Full Code     DISPO: Planning for [facility] Problems as of 12/13/2021 Date Reviewed: 12/9/2021          Codes Class Noted - Resolved POA    Acute metabolic encephalopathy HARRIS-45-UP: G93.41  ICD-9-CM: 348.31  12/10/2021 - Present Yes        Delirium ICD-10-CM: R41.0  ICD-9-CM: 780.09  12/9/2021 - Present Yes        * (Principal) RLL pneumonia ICD-10-CM: J18.9  ICD-9-CM: 556  11/30/2021 - Present Yes        Essential hypertension ICD-10-CM: I10  ICD-9-CM: 401.9  1/4/2017 - Present Yes        Acquired hypothyroidism ICD-10-CM: E03.9  ICD-9-CM: 244.9  10/5/2016 - Present Yes        Macrocytic anemia ICD-10-CM: D53.9  ICD-9-CM: 281.9  10/5/2016 - Present Yes        Mixed hyperlipidemia ICD-10-CM: E78.2  ICD-9-CM: 272.2  10/5/2016 - Present Yes        NIKKI (acute kidney injury) ([facility] [address] ICD-10-CM: N17.9  ICD-9-CM: 584.9  11/7/2015 - Present No              Objective:     Patient Vitals for the past 24 hrs:   Temp Pulse Resp BP SpO2   12/13/21 1511 98 °F (36.7 °C) 67 17 127/73 100 %   12/13/21 1215 97.9 °F (36.6 °C) 72 18 117/68 100 %   12/13/21 0817 98.3 °F (36.8 °C) 72 18 (!) 136/100 100 %   12/13/21 0534 98.6 °F (37 °C) 68 18 (!) 165/78 100 %   12/13/21 0016 99.2 °F (37.3 °C) 72 18 (!) 165/80 99 %   12/12/21 1949 99.1 °F (37.3 °C) 72 18 (!) 149/73 100 %   12/12/21 1633 98.1 °F (36.7 °C) 66 20 (!) 143/70 100 %     Oxygen Therapy  O2 Sat (%): 100 % (12/13/21 1511)  Pulse via Oximetry: 65 beats per minute (12/09/21 0314)  O2 Device: None (Room air) (12/13/21 1048)    Estimated body mass index is 24.74 kg/m² as calculated from the following:    Height as of this encounter: 5' 10\" (1.778 m).     Weight as of this encounter: 78.2 kg (172 lb 6.4 oz).    Intake/Output Summary (Last 24 hours) at 12/13/2021 1512  Last data filed at 12/13/2021 0538  Gross per 24 hour   Intake --   Output 1775 ml   Net -1775 ml         Physical Exam:     Blood pressure 127/73, pulse 67, temperature 98 °F (36.7 °C), resp. rate 17, height 5' 10\" (1.778 m), weight 78.2 kg (172 lb 6.4 oz), SpO2 100 %. General:    Well nourished. No overt distress, lethargic  Head:  Normocephalic, atraumatic  Eyes:  Sclerae appear normal.  Pupils equally round. ENT:  Nares appear normal, no drainage. Moist oral mucosa  Neck:  No restricted ROM. Trachea midline   CV:   RRR. No m/r/g. No jugular venous distension. Lungs:   Scattered crackles. No wheezing. Respirations even, unlabored  Abdomen: Bowel sounds present. Soft, nontender, nondistended. Extremities: No cyanosis or clubbing. No edema  Skin:     No rashes and normal coloration. Warm and dry. Neuro:  CN II-XII grossly intact. A&Ox2-3  Psych:  Normal mood and affect.       I have reviewed ordered lab tests and independently visualized imaging below:    Recent Labs:  Recent Results (from the past 48 hour(s))   CBC W/O DIFF    Collection Time: 12/12/21  6:00 AM   Result Value Ref Range    WBC 4.4 4.3 - 11.1 K/uL    RBC 2.26 (L) 4.05 - 5.2 M/uL    HGB 7.5 (L) 11.7 - 15.4 g/dL    HCT 22.9 (L) 35.8 - 46.3 %    .3 (H) 79.6 - 97.8 FL    MCH 33.2 (H) 26.1 - 32.9 PG    MCHC 32.8 31.4 - 35.0 g/dL    RDW 12.5 11.9 - 14.6 %    PLATELET 016 478 - 131 K/uL    MPV 8.9 (L) 9.4 - 12.3 FL    ABSOLUTE NRBC 0.00 0.0 - 0.2 K/uL   METABOLIC PANEL, BASIC    Collection Time: 12/12/21  6:00 AM   Result Value Ref Range    Sodium 140 136 - 145 mmol/L    Potassium 3.3 (L) 3.5 - 5.1 mmol/L    Chloride 109 (H) 98 - 107 mmol/L    CO2 25 21 - 32 mmol/L    Anion gap 6 (L) 7 - 16 mmol/L    Glucose 89 65 - 100 mg/dL    BUN 29 (H) 8 - 23 MG/DL    Creatinine 1.55 (H) 0.6 - 1.0 MG/DL    GFR est AA 43 (L) >60 ml/min/1.73m2    GFR est non-AA 35 (L) >60 ml/min/1.73m2    Calcium 9.2 8.3 - 10.4 MG/DL   VITAMIN B12    Collection Time: 12/12/21  6:00 AM   Result Value Ref Range    Vitamin B12 709 193 - 986 pg/mL   FERRITIN    Collection Time: 12/12/21  6:00 AM   Result Value Ref Range    Ferritin 1,447 (H) 8 - 388 NG/ML   TRANSFERRIN SATURATION    Collection Time: 12/12/21  6:00 AM   Result Value Ref Range    Iron 58 35 - 150 ug/dL    TIBC 230 (L) 250 - 450 ug/dL    Transferrin Saturation 25 >20 %   FOLATE    Collection Time: 12/12/21  6:00 AM   Result Value Ref Range    Folate 5.4 3.1 - 17.5 ng/mL   MAGNESIUM    Collection Time: 12/12/21  3:57 PM   Result Value Ref Range    Magnesium 2.1 1.8 - 2.4 mg/dL   CBC W/O DIFF    Collection Time: 12/13/21  5:27 AM   Result Value Ref Range    WBC 4.2 (L) 4.3 - 11.1 K/uL    RBC 2.42 (L) 4.05 - 5.2 M/uL    HGB 8.2 (L) 11.7 - 15.4 g/dL    HCT 24.8 (L) 35.8 - 46.3 %    .5 (H) 79.6 - 97.8 FL    MCH 33.9 (H) 26.1 - 32.9 PG    MCHC 33.1 31.4 - 35.0 g/dL    RDW 12.0 11.9 - 14.6 %    PLATELET 219 295 - 094 K/uL    MPV 8.8 (L) 9.4 - 12.3 FL    ABSOLUTE NRBC 0.00 0.0 - 0.2 K/uL   METABOLIC PANEL, BASIC    Collection Time: 12/13/21  5:27 AM   Result Value Ref Range    Sodium 140 136 - 145 mmol/L    Potassium 3.6 3.5 - 5.1 mmol/L    Chloride 107 98 - 107 mmol/L    CO2 27 21 - 32 mmol/L    Anion gap 6 (L) 7 - 16 mmol/L    Glucose 92 65 - 100 mg/dL    BUN 22 8 - 23 MG/DL    Creatinine 1.17 (H) 0.6 - 1.0 MG/DL    GFR est AA 59 (L) >60 ml/min/1.73m2    GFR est non-AA 49 (L) >60 ml/min/1.73m2    Calcium 9.6 8.3 - 10.4 MG/DL       All Micro Results     Procedure Component Value Units Date/Time    CULTURE, BLOOD [[id number]] Collected: 12/09/21 0136    Order Status: Completed Specimen: Blood Updated: 12/13/21 0702     Special Requests: --        RIGHT  HAND       Culture result: NO GROWTH 4 DAYS       CULTURE, BLOOD [[id number]] Collected: 12/09/21 0426    Order Status: Completed Specimen: Blood Updated: 12/13/21 0702     Special Requests: -- RIGHT  ARM       Culture result: NO GROWTH 4 DAYS       COVID-19 RAPID TEST [[id number]] Collected: 12/09/21 0136    Order Status: Completed Specimen: Nasopharyngeal Updated: 12/09/21 0223     Specimen source NASAL        COVID-19 rapid test Not detected        Comment:      The specimen is NEGATIVE for SARS-CoV-2, the novel coronavirus associated with COVID-19. A negative result does not rule out COVID-19. This test has been authorized by the FDA under an Emergency Use Authorization (EUA) for use by authorized laboratories. Fact sheet for Healthcare Providers: [employer]com  Fact sheet for Patients: kstattoo.com       Methodology: Isothermal Nucleic Acid Amplification               Other Studies:  No results found.     Current Meds:  Current Facility-Administered Medications   Medication Dose Route Frequency    tuberculin injection 5 Units  5 Units IntraDERMal ONCE    0.9% sodium chloride infusion  75 mL/hr IntraVENous CONTINUOUS    atorvastatin (LIPITOR) tablet 80 mg  80 mg Oral DAILY    carvediloL (COREG) tablet 12.5 mg  12.5 mg Oral DAILY    cholecalciferol (VITAMIN D3) (1000 Units /25 mcg) tablet 4,000 Units  4,000 Units Oral DAILY    famotidine (PEPCID) tablet 40 mg  40 mg Oral DAILY    ezetimibe (ZETIA) tablet 10 mg  10 mg Oral DAILY    amLODIPine (NORVASC) tablet 5 mg  5 mg Oral DAILY    levothyroxine (SYNTHROID) tablet 150 mcg  150 mcg Oral ACB    [Held by provider] lisinopril-hydroCHLOROthiazide (PRINZIDE, ZESTORETIC) 20-12.5 mg per tablet 1 Tablet  1 Tablet Oral BID    sertraline (ZOLOFT) tablet 150 mg  150 mg Oral DAILY    sodium chloride (NS) flush 5-40 mL  5-40 mL IntraVENous Q8H    sodium chloride (NS) flush 5-40 mL  5-40 mL IntraVENous PRN    enoxaparin (LOVENOX) injection 40 mg  40 mg SubCUTAneous Q24H    LORazepam (ATIVAN) injection 1 mg  1 mg IntraVENous Q6H PRN    sodium chloride (NS) flush 5-10 mL  5-10 mL IntraVENous Q8H    sodium chloride (NS) flush 5-10 mL  5-10 mL IntraVENous PRN       Signed:  Darshan Spain MD    Part of this note may have been written by using a voice dictation software. The note has been proof read but may still contain some grammatical/other typographical errors.

## 2021-12-13 NOTE — PROGRESS NOTES
ACUTE OT GOALS:  (Developed with and agreed upon by patient and/or caregiver.)  1. Pt will toilet withCGA   2. Pt will complete functional mobility for ADLs with CGA using AD as needed  3. Pt will complete lower body dressing with CGA using AE as needed  4. Pt will complete grooming and hygiene at sink with CGA  5. Pt will tolerate 23 minutes functional activity with min or fewer rest breaks to promote increased endurance for ADLs  6. Pt will complete upper body bathing and dressing with CGA   7. Pt will demonstrate improved cognition to complete functional tasks with min or fewer cues    Timeframe: 7 days      OCCUPATIONAL THERAPY ASSESSMENT: Initial Assessment and Daily Note OT Treatment Day # 1    Lazarus Persons is a 77 y.o. female   PRIMARY DIAGNOSIS: RLL pneumonia  CAP (community acquired pneumonia) [J18.9]       Reason for Referral:  CAP, generalized weakness, AMS  ICD-10: Treatment Diagnosis: Generalized Muscle Weakness (M62.81)  History of falling (Z91.81)  INPATIENT: Payor: [employer] MEDICARE COMPLETE / Plan: [facility] MEDICARE COMPLETE / Product Type: Managed Care Medicare /   ASSESSMENT:     REHAB RECOMMENDATIONS:   Recommendation to date pending progress:  Setting:   Short-term Rehab  Equipment:    None     PRIOR LEVEL OF FUNCTION:  (Prior to Hospitalization)  INITIAL/CURRENT LEVEL OF FUNCTION:  (Based on today's evaluation)   Bathing:   Independent  Dressing:   Independent  Feeding/Grooming:   Independent  Toileting:   Independent  Functional Mobility:   Modified Independent Bathing:   Moderate Assistance  Dressing:   Moderate Assistance  Feeding/Grooming:   Minimal Assistance  Toileting:   Moderate Assistance  Functional Mobility:   Moderate Assistance     ASSESSMENT:  Ms. Alex Yao was admitted with weakness, AMS, and fall at home. Pt presented generally weak with deficits in cognition, mobility, endurance, strength, and balance impacting ADLs.  Pt with flat affect and slow processing and did not make eye contact or visually attend to therapist. Pt did not follow commands and became agitated with cuing. Pt initiated several tasks (such as doffing socks) with CGA but did not complete. Pt required mod-max A for bathing, dressing, and bed mobility, however anticipate that this is largely d/t cognition. Pt is below her functional baseline and would benefit from skilled OT services to address deficits. SUBJECTIVE:   Ms. Aakash Nation states, \"wait a minute. \"    SOCIAL HISTORY/LIVING ENVIRONMENT: Pt unable to provide. Per chart, pt lives alone, was independent with ADLs and most IADLs, RW.    Home Environment: Private residence  # Steps to Enter: 3  One/Two Story Residence: One story  Living Alone: Yes  Support Systems: Child(annamaria)    OBJECTIVE:     PAIN: VITAL SIGNS: LINES/DRAINS:   Pre Treatment: Pain Screen  Pain Scale 1: Numeric (0 - 10)  Pain Intensity 1: 0  Post Treatment: 0   IV  O2 Device: None (Room air)     GROSS EVALUATION:  BUE Within Functional Limits Abnormal/ Functional Abnormal/ Non-Functional (see comments) Not Tested Comments:   AROM [x] [] [] []    PROM [] [] [] []    Strength [] [x] [] []    Balance [] [x] [] []    Posture [] [] [] []    Sensation [] [] [] []    Coordination [x] [] [] []    Tone [] [] [] []    Edema [] [] [] []    Activity Tolerance [] [x] [] []     [] [] [] []      COGNITION/  PERCEPTION: Intact Impaired   (see comments) Comments:   Orientation [] [x]    Vision [] []    Hearing [] []    Judgment/ Insight [] [x]    Attention [] [x]    Memory [] [x]    Command Following [] [x]    Emotional Regulation [] [x]     [] []      ACTIVITIES OF DAILY LIVING: I Mod I S SBA CGA Min Mod Max Total NT Comments   BASIC ADLs:              Bathing/ Showering [] [] [] [] [] [] [x] [] [] []    Toileting [] [] [] [] [] [] [] [] [] []    Dressing [] [] [] [] [] [] [x] [] [] []    Feeding [] [] [] [] [] [] [] [] [] []    Grooming [] [] [] [] [] [] [x] [] [] []    Personal Device Care [] [] [] [] [] [] [] [] [] []    Functional Mobility [] [] [] [] [] [] [x] [] [] []    I=Independent, Mod I=Modified Independent, S=Supervision, SBA=Standby Assistance, CGA=Contact Guard Assistance,   Min=Minimal Assistance, Mod=Moderate Assistance, Max=Maximal Assistance, Total=Total Assistance, NT=Not Tested    MOBILITY: I Mod I S SBA CGA Min Mod Max Total  NT x2 Comments:   Supine to sit [] [] [] [] [] [] [x] [] [] [] []    Sit to supine [] [] [] [] [] [] [x] [] [] [] []    Sit to stand [] [] [] [] [] [] [] [] [] [] []    Bed to chair [] [] [] [] [] [] [] [] [] [] []    I=Independent, Mod I=Modified Independent, S=Supervision, SBA=Standby Assistance, CGA=Contact Guard Assistance,   Min=Minimal Assistance, Mod=Moderate Assistance, Max=Maximal Assistance, Total=Total Assistance, NT=Not [facility] 6 Clicks   Daily Activity Inpatient Short Form        How much help from another person does the patient currently need. .. Total A Lot A Little None   1. Putting on and taking off regular lower body clothing? [] 1   [x] 2   [] 3   [] 4   2. Bathing (including washing, rinsing, drying)? [] 1   [x] 2   [] 3   [] 4   3. Toileting, which includes using toilet, bedpan or urinal?   [] 1   [x] 2   [] 3   [] 4   4. Putting on and taking off regular upper body clothing? [] 1   [x] 2   [] 3   [] 4   5. Taking care of personal grooming such as brushing teeth? [] 1   [x] 2   [] 3   [] 4   6. Eating meals? [] 1   [x] 2   [] 3   [] 4   © 2007, Trustees of [address] [place] [address] [address] [id number], under license to [employer]. All rights reserved     Score:  Initial: 12 Most Recent: X (Date: -- )   Interpretation of Tool:  Represents activities that are increasingly more difficult (i.e. Bed mobility, Transfers, Gait).     PLAN:   FREQUENCY/DURATION: OT Plan of Care: 3 times/week for duration of hospital stay or until stated goals are met, whichever comes first.    PROBLEM LIST:   (Skilled intervention is medically necessary to address:)  1. Decreased ADL/Functional Activities  2. Decreased Activity Tolerance  3. Decreased Balance  4. Decreased Cognition  5. Decreased Strength  6. Decreased Transfer Abilities   INTERVENTIONS PLANNED:   (Benefits and precautions of occupational therapy have been discussed with the patient.)  1. Self Care Training  2. Therapeutic Activity  3. Therapeutic Exercise/HEP  4. Neuromuscular Re-education  5. Education     TREATMENT:     EVALUATION: Moderate Complexity : (Untimed Charge)    TREATMENT:   ($$ Self Care/Home Management: 8-22 mins    )  Self Care (10 Minutes): Self care including Upper Body Bathing, Lower Body Bathing, Upper Body Dressing, Lower Body Dressing and Grooming to increase independence and decrease level of assistance required.     TREATMENT GRID:  N/A    AFTER TREATMENT POSITION/PRECAUTIONS:  Alarm Activated, Bed, Needs within reach and RN notified    INTERDISCIPLINARY COLLABORATION:  RN/PCT    TOTAL TREATMENT DURATION:  OT Patient Time In/Time Out  Time In: 1434  Time Out: [address] [facility],

## 2021-12-13 NOTE — PROGRESS NOTES
Attempted to reach son, Hoa Orantes at [phone] (requested calls between 12-1pm and after 6pm or leave a message for a return call due to his work having phone restrictions. Left a message with this CM contact info and also floor number to reach Tevin Faustin RN CM who will be picking up pt case tomorrow. Await call back to discuss SNF rehab options and referral choices. 1900  Received call back from son. Pt and family live in the [address] [place] [address] area. Discussed referrals and emailed SNF list to son at [email or web address]   Will follow up with son tomorrow for SNF choices.

## 2021-12-13 NOTE — PROGRESS NOTES
Problem: Pressure Injury - Risk of  Goal: *Prevention of pressure injury  Description: Document Fredo Scale and appropriate interventions in the flowsheet. Outcome: Progressing Towards Goal  Note: Pressure Injury Interventions:  Sensory Interventions: Assess changes in LOC    Moisture Interventions: Apply protective barrier, creams and emollients    Activity Interventions: PT/OT evaluation    Mobility Interventions: Assess need for specialty bed    Nutrition Interventions: Document food/fluid/supplement intake    Friction and Shear Interventions: Apply protective barrier, creams and emollients                Problem: Patient Education: Go to Patient Education Activity  Goal: Patient/Family Education  Outcome: Progressing Towards Goal     Problem: Falls - Risk of  Goal: *Absence of Falls  Description: Document Lakshmi Fall Risk and appropriate interventions in the flowsheet.   Outcome: Progressing Towards Goal  Note: Fall Risk Interventions:  Mobility Interventions: Bed/chair exit alarm    Mentation Interventions: Adequate sleep, hydration, pain control    Medication Interventions: Bed/chair exit alarm    Elimination Interventions: Bed/chair exit alarm              Problem: Patient Education: Go to Patient Education Activity  Goal: Patient/Family Education  Outcome: Progressing Towards Goal     Problem: Patient Education: Go to Patient Education Activity  Goal: Patient/Family Education  Outcome: Progressing Towards Goal

## 2021-12-13 NOTE — PROGRESS NOTES
ACUTE PHYSICAL THERAPY GOALS:  (Developed with and agreed upon by patient and/or caregiver. )  LTG:  (1.)Ms. Alfreda Valladares will move from supine to sit and sit to supine , scoot up and down and roll side to side in bed with INDEPENDENCE within 7 treatment day(s). (2.)Ms. Alfreda Valladares will transfer from bed to chair and chair to bed with SUPERVISION using the least restrictive device within 7 treatment day(s). (3.)Ms. Alfreda Valladares will ambulate with SUPERVISION for 200 feet with the least restrictive device within 7 treatment day(s). (4.)Ms. Alfreda Valladares will perform exercises per HEP in sitting and/or standing to improve strength and mobility within 7 days. (5.)Ms. Alfreda Valladares will ascend and descend 4 steps with CONTACT GUARD ASSIST using handrail(s) within 7 days. PHYSICAL THERAPY: Daily Note and AM Treatment Day # 2    Melissa Garcia is a 77 y.o. female   PRIMARY DIAGNOSIS: RLL pneumonia  CAP (community acquired pneumonia) [J18.9]         ASSESSMENT:     REHAB RECOMMENDATIONS: CURRENT LEVEL OF FUNCTION:  (Most Recently Demonstrated)   Recommendation to date pending progress:  Setting:   Short-term Rehab  Equipment:    To Be Determined Bed Mobility:   Minimal Assistance  Sit to Stand:   Minimal Assistance  Transfers:   Minimal Assistance  Gait/Mobility:   Minimal Assistance     ASSESSMENT:  Ms. Alfreda Valladares is admitted from home with weakness, fall, and altered mental status with recent UTI. Unclear PLOF- based on conversation it seems pt lives alone, [facility], and was ambulating with walker during therapy. She presents with continued confusion and delayed responses/command following today, seems a little more alert garcia yesterday. Still needs max additional time and cues for mobility. Min assist for transfers and CGA-min assist for ambulation in hallway x 100 ft with RW. Pt with slow pace and small shuffled steps, needs cueing for increased step clearance, posture, walker management.  Practiced standing static/dynamic activities in room to address balance/tolerance. Returned to supine after session with bed alarm on, needs in reach. Needs rehab at [place]. SUBJECTIVE:   Ms. uRbens Swann states, \"I don't know how to do this. \"    SOCIAL HISTORY/ LIVING ENVIRONMENT: unclear PLOF/home environment. Based on our conversation it seems like pt lives alone (son recently moved out), has home therapy, and ambulates with walker. She tells me she is able to fix meals and is independent with aDLs.    Home Environment: Private residence  # Steps to Enter: 3  One/Two Story Residence: One story  Living Alone: Yes  Support Systems: Child(annamaria)  OBJECTIVE:     PAIN: VITAL SIGNS: LINES/DRAINS:   Pre Treatment: Pain Screen  Pain Scale 1: Numeric (0 - 10)  Pain Intensity 1: 0  Post Treatment: 0/10 Vital Signs  O2 Device: None (Room air) IV and Purewick  O2 Device: None (Room air)     MOBILITY: I Mod I S SBA CGA Min Mod Max Total  NT x2 Comments:   Bed Mobility    Rolling [] [] [] [] [x] [] [] [] [] [] []    Supine to Sit [] [] [] [] [x] [x] [] [] [] [] []    Scooting [] [] [] [] [x] [] [] [] [] [] []    Sit to Supine [] [] [] [] [] [x] [] [] [] [] []    Transfers    Sit to Stand [] [] [] [] [] [x] [] [] [] [] []    Bed to Chair [] [] [] [] [] [] [] [] [] [] []    Stand to Sit [] [] [] [] [] [x] [] [] [] [] []    I=Independent, Mod I=Modified Independent, S=Supervision, SBA=Standby Assistance, CGA=Contact Guard Assistance,   Min=Minimal Assistance, Mod=Moderate Assistance, Max=Maximal Assistance, Total=Total Assistance, NT=Not Tested    BALANCE: Good Fair+ Fair Fair- Poor NT Comments   Sitting Static [] [x] [] [] [] []    Sitting Dynamic [] [x] [] [] [] []              Standing Static [] [] [x] [] [] []    Standing Dynamic [] [] [x] [] [] []      GAIT: I Mod I S SBA CGA Min Mod Max Total  NT x2 Comments:   Level of Assistance [] [] [] [] [x] [x] [] [] [] [] []    Distance 100'    DME Rolling Walker    Gait Quality Very slow, delayed, forward posture, small steps Weightbearing  Status N/A     I=Independent, Mod I=Modified Independent, S=Supervision, SBA=Standby Assistance, CGA=Contact Guard Assistance,   Min=Minimal Assistance, Mod=Moderate Assistance, Max=Maximal Assistance, Total=Total Assistance, NT=Not Tested    PLAN:   FREQUENCY/DURATION: PT Plan of Care: 3 times/week for duration of hospital stay or until stated goals are met, whichever comes first.  TREATMENT:     TREATMENT:   ($$ Therapeutic Activity: 23-37 mins    )  Therapeutic Activity (25 Minutes): Therapeutic activity included Rolling, Supine to Sit, Sit to Supine, Scooting, Transfer Training, Ambulation on level ground, Sitting balance  and Standing balance to improve functional Mobility, Strength, Activity tolerance and balance.     TREATMENT GRID:  N/A    AFTER TREATMENT POSITION/PRECAUTIONS:  Alarm Activated, Bed, Needs within reach and RN notified    INTERDISCIPLINARY COLLABORATION:  RN/PCT and PT/PTA    TOTAL TREATMENT DURATION:  PT Patient Time In/Time Out  Time In: 1048  Time Out: [address], DPT

## 2021-12-14 PROBLEM — N18.30 CKD (CHRONIC KIDNEY DISEASE) STAGE 3, GFR 30-59 ML/MIN (HCC): Status: ACTIVE | Noted: 2021-12-14

## 2021-12-14 LAB
ANION GAP SERPL CALC-SCNC: 7 MMOL/L (ref 7–16)
BACTERIA SPEC CULT: NORMAL
BACTERIA SPEC CULT: NORMAL
BUN SERPL-MCNC: 20 MG/DL (ref 8–23)
CALCIUM SERPL-MCNC: 9.5 MG/DL (ref 8.3–10.4)
CHLORIDE SERPL-SCNC: 105 MMOL/L (ref 98–107)
CO2 SERPL-SCNC: 28 MMOL/L (ref 21–32)
CREAT SERPL-MCNC: 1.19 MG/DL (ref 0.6–1)
ERYTHROCYTE [DISTWIDTH] IN BLOOD BY AUTOMATED COUNT: 11.9 % (ref 11.9–14.6)
GLUCOSE SERPL-MCNC: 85 MG/DL (ref 65–100)
HCT VFR BLD AUTO: 24.9 % (ref 35.8–46.3)
HGB BLD-MCNC: 8.4 G/DL (ref 11.7–15.4)
MCH RBC QN AUTO: 34.4 PG (ref 26.1–32.9)
MCHC RBC AUTO-ENTMCNC: 33.7 G/DL (ref 31.4–35)
MCV RBC AUTO: 102 FL (ref 79.6–97.8)
MM INDURATION POC: 0 MM (ref 0–5)
NRBC # BLD: 0 K/UL (ref 0–0.2)
PLATELET # BLD AUTO: 225 K/UL (ref 150–450)
PMV BLD AUTO: 9.1 FL (ref 9.4–12.3)
POTASSIUM SERPL-SCNC: 3.7 MMOL/L (ref 3.5–5.1)
PPD POC: NEGATIVE NEGATIVE
RBC # BLD AUTO: 2.44 M/UL (ref 4.05–5.2)
SERVICE CMNT-IMP: NORMAL
SERVICE CMNT-IMP: NORMAL
SODIUM SERPL-SCNC: 140 MMOL/L (ref 136–145)
WBC # BLD AUTO: 4 K/UL (ref 4.3–11.1)

## 2021-12-14 PROCEDURE — 36415 COLL VENOUS BLD VENIPUNCTURE: CPT

## 2021-12-14 PROCEDURE — 80048 BASIC METABOLIC PNL TOTAL CA: CPT

## 2021-12-14 PROCEDURE — 74011250636 HC RX REV CODE- 250/636: Performed by: EMERGENCY MEDICINE

## 2021-12-14 PROCEDURE — 85027 COMPLETE CBC AUTOMATED: CPT

## 2021-12-14 PROCEDURE — 65270000029 HC RM PRIVATE

## 2021-12-14 PROCEDURE — 74011250637 HC RX REV CODE- 250/637: Performed by: EMERGENCY MEDICINE

## 2021-12-14 PROCEDURE — 97530 THERAPEUTIC ACTIVITIES: CPT

## 2021-12-14 RX ADMIN — ATORVASTATIN CALCIUM 80 MG: 40 TABLET, FILM COATED ORAL at 08:47

## 2021-12-14 RX ADMIN — EZETIMIBE 10 MG: 10 TABLET ORAL at 08:47

## 2021-12-14 RX ADMIN — Medication 10 ML: at 07:01

## 2021-12-14 RX ADMIN — Medication 10 ML: at 14:31

## 2021-12-14 RX ADMIN — Medication 10 ML: at 21:13

## 2021-12-14 RX ADMIN — ENOXAPARIN SODIUM 40 MG: 100 INJECTION SUBCUTANEOUS at 08:48

## 2021-12-14 RX ADMIN — SERTRALINE 150 MG: 100 TABLET, FILM COATED ORAL at 08:48

## 2021-12-14 RX ADMIN — VITAMIN D, TAB 1000IU (100/BT) 4000 UNITS: 25 TAB at 08:46

## 2021-12-14 RX ADMIN — LEVOTHYROXINE SODIUM 150 MCG: 0.05 TABLET ORAL at 06:49

## 2021-12-14 RX ADMIN — FAMOTIDINE 40 MG: 20 TABLET ORAL at 08:47

## 2021-12-14 RX ADMIN — CARVEDILOL 12.5 MG: 3.12 TABLET, FILM COATED ORAL at 08:48

## 2021-12-14 RX ADMIN — AMLODIPINE BESYLATE 5 MG: 10 TABLET ORAL at 08:48

## 2021-12-14 NOTE — PROGRESS NOTES
Hospitalist Progress Note   Admit Date:  2021  4:54 PM   Name:  Antonieta Lobo   Age:  77 y.o. Sex:  female  :  1955   MRN:  [id number]   Room:  [facility]    Presenting Complaint: Fall    Reason(s) for Admission: CAP (community acquired pneumonia) [J18.9]     Hospital Course & Interval History:     Please refer to the admission H&P for details of presentation. In summary, Antonieta Lobo is a 77 y.o. female with medical history significant for hypertension, hyperlipidemia, hypothyroidism who was admitted due to new onset of confusion that has worsened over the last several days. Patient was noted to have worsening right lower lobe infiltrate compared to chest x-ray from 2021. Patient was noted to have acute metabolic encephalopathy but CT head showed no evidence of acute cranial hemorrhage or abnormalities      Subjective/24 hr Events (21) :  Patient is seen and examined at bedside. No acute events reported overnight by nursing staff. More Alert, awake. AAOx3. No acute distress. Patient denies any any cough, fever, chills, chest pains, shortness of breath, n/v, abdominal pain. Review of Systems: 10 point review of systems is otherwise negative with the exception of the elements mentioned above. Assessment & Plan:     RLL Pneumonia   Chest x-ray noted to have worsening right lower lobe infiltrate compared to x-ray from . Completed a course of Abx  -Currently on room air without any signs of hypoxia. Monitor and supplement oxygen as needed    Acute Metabolic encephalopathy - improving  CT head negative for any acute intracranial abnormalities. Likely due to ongoing underlying infection  - treat underlying infection as above    NIKKI on CKD 3  Baseline 1 - 1.37 . CrCreatinine peaked at 2.10 and now down trending to 1. 19. cR  Possibly due to dehydration as patient has been confused and not taking anything p.o. much.   -IV fluids   -holding prinzide due to nikki    Acquired hypothyroidism : synthroid    Essential hypertension:  - carvedilol, amlodipine. - hold  lisinopril-hydrochlorothiazide due to NIKKI    Mixed hyperlipidemia : On atorvastatin and ezetimibe      Diet:  ADULT DIET Regular; Low Fat/Low Chol/High Fiber/GISELLE  DVT PPx: lovenox  Code status: Full Code     DISPO: Planning for [facility] Problems as of 12/14/2021 Date Reviewed: 12/9/2021          Codes Class Noted - Resolved POA    Acute metabolic encephalopathy DJV-29-WO: G93.41  ICD-9-CM: 348.31  12/10/2021 - Present Yes        Delirium ICD-10-CM: R41.0  ICD-9-CM: 780.09  12/9/2021 - Present Yes        * (Principal) RLL pneumonia ICD-10-CM: J18.9  ICD-9-CM: 156  11/30/2021 - Present Yes        Essential hypertension ICD-10-CM: I10  ICD-9-CM: 401.9  1/4/2017 - Present Yes        Acquired hypothyroidism ICD-10-CM: E03.9  ICD-9-CM: 244.9  10/5/2016 - Present Yes        Macrocytic anemia ICD-10-CM: D53.9  ICD-9-CM: 281.9  10/5/2016 - Present Yes        Mixed hyperlipidemia ICD-10-CM: E78.2  ICD-9-CM: 272.2  10/5/2016 - Present Yes        NIKKI (acute kidney injury) ([facility] [address] ICD-10-CM: N17.9  ICD-9-CM: 584.9  11/7/2015 - Present No              Objective:     Patient Vitals for the past 24 hrs:   Temp Pulse Resp BP SpO2   12/14/21 1208 98.4 °F (36.9 °C) 70 16 115/68 99 %   12/14/21 0744 98.8 °F (37.1 °C) 80 16 (!) 171/84 98 %   12/14/21 0455 99.2 °F (37.3 °C) 75 18 (!) 149/80 99 %   12/13/21 2347 100 °F (37.8 °C) 76 18 (!) 155/84 99 %   12/13/21 1918 98.5 °F (36.9 °C) 71 18 (!) 153/78 100 %     Oxygen Therapy  O2 Sat (%): 99 % (12/14/21 1208)  Pulse via Oximetry: 65 beats per minute (12/09/21 0314)  O2 Device: None (Room air) (12/14/21 1110)    Estimated body mass index is 24.74 kg/m² as calculated from the following:    Height as of this encounter: 5' 10\" (1.778 m). Weight as of this encounter: 78.2 kg (172 lb 6.4 oz).     Intake/Output Summary (Last 24 hours) at 12/14/2021 1512  Last data filed at 12/14/2021 1208  Gross per 24 hour   Intake --   Output 1100 ml   Net -1100 ml         Physical Exam:     Blood pressure 115/68, pulse 70, temperature 98.4 °F (36.9 °C), resp. rate 16, height 5' 10\" (1.778 m), weight 78.2 kg (172 lb 6.4 oz), SpO2 99 %. General:    Well nourished. No overt distress, lethargic  Head:  Normocephalic, atraumatic  Eyes:  Sclerae appear normal.  Pupils equally round. ENT:  Nares appear normal, no drainage. Moist oral mucosa  Neck:  No restricted ROM. Trachea midline   CV:   RRR. No m/r/g. No jugular venous distension. Lungs:   Scattered crackles. No wheezing. Respirations even, unlabored  Abdomen: Bowel sounds present. Soft, nontender, nondistended. Extremities: No cyanosis or clubbing. No edema  Skin:     No rashes and normal coloration. Warm and dry. Neuro:  CN II-XII grossly intact. A&Ox2-3  Psych:  Normal mood and affect.       I have reviewed ordered lab tests and independently visualized imaging below:    Recent Labs:  Recent Results (from the past 48 hour(s))   MAGNESIUM    Collection Time: 12/12/21  3:57 PM   Result Value Ref Range    Magnesium 2.1 1.8 - 2.4 mg/dL   CBC W/O DIFF    Collection Time: 12/13/21  5:27 AM   Result Value Ref Range    WBC 4.2 (L) 4.3 - 11.1 K/uL    RBC 2.42 (L) 4.05 - 5.2 M/uL    HGB 8.2 (L) 11.7 - 15.4 g/dL    HCT 24.8 (L) 35.8 - 46.3 %    .5 (H) 79.6 - 97.8 FL    MCH 33.9 (H) 26.1 - 32.9 PG    MCHC 33.1 31.4 - 35.0 g/dL    RDW 12.0 11.9 - 14.6 %    PLATELET 313 335 - 288 K/uL    MPV 8.8 (L) 9.4 - 12.3 FL    ABSOLUTE NRBC 0.00 0.0 - 0.2 K/uL   METABOLIC PANEL, BASIC    Collection Time: 12/13/21  5:27 AM   Result Value Ref Range    Sodium 140 136 - 145 mmol/L    Potassium 3.6 3.5 - 5.1 mmol/L    Chloride 107 98 - 107 mmol/L    CO2 27 21 - 32 mmol/L    Anion gap 6 (L) 7 - 16 mmol/L    Glucose 92 65 - 100 mg/dL    BUN 22 8 - 23 MG/DL    Creatinine 1.17 (H) 0.6 - 1.0 MG/DL    GFR est AA 59 (L) >60 ml/min/1.73m2    GFR est non-AA 49 (L) >60 ml/min/1.73m2    Calcium 9.6 8.3 - 10.4 MG/DL   CBC W/O DIFF    Collection Time: 12/14/21  5:52 AM   Result Value Ref Range    WBC 4.0 (L) 4.3 - 11.1 K/uL    RBC 2.44 (L) 4.05 - 5.2 M/uL    HGB 8.4 (L) 11.7 - 15.4 g/dL    HCT 24.9 (L) 35.8 - 46.3 %    .0 (H) 79.6 - 97.8 FL    MCH 34.4 (H) 26.1 - 32.9 PG    MCHC 33.7 31.4 - 35.0 g/dL    RDW 11.9 11.9 - 14.6 %    PLATELET 373 043 - 112 K/uL    MPV 9.1 (L) 9.4 - 12.3 FL    ABSOLUTE NRBC 0.00 0.0 - 0.2 K/uL   METABOLIC PANEL, BASIC    Collection Time: 12/14/21  5:52 AM   Result Value Ref Range    Sodium 140 136 - 145 mmol/L    Potassium 3.7 3.5 - 5.1 mmol/L    Chloride 105 98 - 107 mmol/L    CO2 28 21 - 32 mmol/L    Anion gap 7 7 - 16 mmol/L    Glucose 85 65 - 100 mg/dL    BUN 20 8 - 23 MG/DL    Creatinine 1.19 (H) 0.6 - 1.0 MG/DL    GFR est AA 58 (L) >60 ml/min/1.73m2    GFR est non-AA 48 (L) >60 ml/min/1.73m2    Calcium 9.5 8.3 - 10.4 MG/DL   PLEASE READ & DOCUMENT PPD TEST IN 24 HRS    Collection Time: 12/14/21 12:02 PM   Result Value Ref Range    PPD Negative Negative    mm Induration 0 0 - 5 mm       All Micro Results     Procedure Component Value Units Date/Time    CULTURE, BLOOD [[id number]] Collected: 12/09/21 0136    Order Status: Completed Specimen: Blood Updated: 12/14/21 0614     Special Requests: --        RIGHT  HAND       Culture result: NO GROWTH 5 DAYS       CULTURE, BLOOD [[id number]] Collected: 12/09/21 0426    Order Status: Completed Specimen: Blood Updated: 12/14/21 0614     Special Requests: --        RIGHT  ARM       Culture result: NO GROWTH 5 DAYS       COVID-19 RAPID TEST [[id number]] Collected: 12/09/21 0136    Order Status: Completed Specimen: Nasopharyngeal Updated: 12/09/21 0223     Specimen source NASAL        COVID-19 rapid test Not detected        Comment:      The specimen is NEGATIVE for SARS-CoV-2, the novel coronavirus associated with COVID-19. A negative result does not rule out COVID-19.        This test has been authorized by the FDA under an Emergency Use Authorization (EUA) for use by authorized laboratories. Fact sheet for Healthcare Providers: ConventionUpdate.co.nz  Fact sheet for Patients: ConventionUpdate.co.nz       Methodology: Isothermal Nucleic Acid Amplification               Other Studies:  No results found. Current Meds:  Current Facility-Administered Medications   Medication Dose Route Frequency    0.9% sodium chloride infusion  75 mL/hr IntraVENous CONTINUOUS    atorvastatin (LIPITOR) tablet 80 mg  80 mg Oral DAILY    carvediloL (COREG) tablet 12.5 mg  12.5 mg Oral DAILY    cholecalciferol (VITAMIN D3) (1000 Units /25 mcg) tablet 4,000 Units  4,000 Units Oral DAILY    famotidine (PEPCID) tablet 40 mg  40 mg Oral DAILY    ezetimibe (ZETIA) tablet 10 mg  10 mg Oral DAILY    amLODIPine (NORVASC) tablet 5 mg  5 mg Oral DAILY    levothyroxine (SYNTHROID) tablet 150 mcg  150 mcg Oral ACB    [Held by provider] lisinopril-hydroCHLOROthiazide (PRINZIDE, ZESTORETIC) 20-12.5 mg per tablet 1 Tablet  1 Tablet Oral BID    sertraline (ZOLOFT) tablet 150 mg  150 mg Oral DAILY    sodium chloride (NS) flush 5-40 mL  5-40 mL IntraVENous Q8H    sodium chloride (NS) flush 5-40 mL  5-40 mL IntraVENous PRN    enoxaparin (LOVENOX) injection 40 mg  40 mg SubCUTAneous Q24H    LORazepam (ATIVAN) injection 1 mg  1 mg IntraVENous Q6H PRN    sodium chloride (NS) flush 5-10 mL  5-10 mL IntraVENous Q8H    sodium chloride (NS) flush 5-10 mL  5-10 mL IntraVENous PRN       Signed:  Carmelita Christianson MD    Part of this note may have been written by using a voice dictation software. The note has been proof read but may still contain some grammatical/other typographical errors.

## 2021-12-14 NOTE — PROGRESS NOTES
Problem: Pressure Injury - Risk of  Goal: *Prevention of pressure injury  Description: Document Fredo Scale and appropriate interventions in the flowsheet. Outcome: Progressing Towards Goal  Note: Pressure Injury Interventions:  Sensory Interventions: Assess changes in LOC    Moisture Interventions: Absorbent underpads, Apply protective barrier, creams and emollients    Activity Interventions: PT/OT evaluation    Mobility Interventions: PT/OT evaluation    Nutrition Interventions: Offer support with meals,snacks and hydration    Friction and Shear Interventions: Apply protective barrier, creams and emollients          Problem: Falls - Risk of  Goal: *Absence of Falls  Description: Document Lakshmi Fall Risk and appropriate interventions in the flowsheet.   Outcome: Progressing Towards Goal  Note: Fall Risk Interventions:  Mobility Interventions: Bed/chair exit alarm, PT Consult for mobility concerns    Mentation Interventions: Door open when patient unattended, Bed/chair exit alarm    Medication Interventions: Bed/chair exit alarm, Patient to call before getting OOB    Elimination Interventions: Call light in reach, Bed/chair exit alarm

## 2021-12-14 NOTE — PROGRESS NOTES
ACUTE PHYSICAL THERAPY GOALS:  (Developed with and agreed upon by patient and/or caregiver. )  LTG:  (1.)Ms. Sobeida Alvarenga will move from supine to sit and sit to supine , scoot up and down and roll side to side in bed with INDEPENDENCE within 7 treatment day(s). (2.)Ms. Sobeida Alvarenga will transfer from bed to chair and chair to bed with SUPERVISION using the least restrictive device within 7 treatment day(s). (3.)Ms. Sobeida Alvarenga will ambulate with SUPERVISION for 200 feet with the least restrictive device within 7 treatment day(s). (4.)Ms. Sobeida Alvarenga will perform exercises per HEP in sitting and/or standing to improve strength and mobility within 7 days. (5.)Ms. Sobeida Alvarenga will ascend and descend 4 steps with CONTACT GUARD ASSIST using handrail(s) within 7 days. PHYSICAL THERAPY: Daily Note and AM Treatment Day # 3    Rodney Srinivasan is a 77 y.o. female   PRIMARY DIAGNOSIS: RLL pneumonia  CAP (community acquired pneumonia) [J18.9]         ASSESSMENT:     REHAB RECOMMENDATIONS: CURRENT LEVEL OF FUNCTION:  (Most Recently Demonstrated)   Recommendation to date pending progress:  Setting:   Short-term Rehab  Equipment:    To Be Determined Bed Mobility:   Minimal Assistance  Sit to Stand:   Minimal Assistance  Transfers:   Minimal Assistance  Gait/Mobility:   Minimal Assistance     ASSESSMENT:  Ms. Sobeida Alvarenga is admitted from home with weakness, fall, and altered mental status with recent UTI. Unclear PLOF- based on conversation it seems pt lives alone, has [facility] PT, and was ambulating with walker during therapy. 12/14- pt presents with continued altered mental status and delayed responses/processing. Needs additional time and cueing for all mobility. Continues to need min assist with bed mobility and transfers, CGA-min assist for ambulation in room and hallway with cues for walker management, gait sequencing/safety, and body mechanics. Demonstrates very slow/delayed pace and needs verbal cues to continue.  Able to slightly increase gait distance today. Pt will need rehab at [place]. SUBJECTIVE:   Ms. [facility], \"What am I doing? \"    SOCIAL HISTORY/ LIVING ENVIRONMENT: unclear PLOF/home environment. Based on our conversation it seems like pt lives alone (son recently moved out), has home therapy, and ambulates with walker. She tells me she is able to fix meals and is independent with aDLs.    Home Environment: Private residence  # Steps to Enter: 3  One/Two Story Residence: One story  Living Alone: Yes  Support Systems: Child(annamaria)  OBJECTIVE:     PAIN: VITAL SIGNS: LINES/DRAINS:   Pre Treatment: Pain Screen  Pain Scale 1: Numeric (0 - 10)  Pain Intensity 1: 0  Post Treatment: 0/10 Vital Signs  O2 Device: None (Room air) IV and Purewick  O2 Device: None (Room air)     MOBILITY: I Mod I S SBA CGA Min Mod Max Total  NT x2 Comments:   Bed Mobility    Rolling [] [] [] [] [] [] [] [] [] [] []    Supine to Sit [] [] [] [] [] [x] [] [] [] [] []    Scooting [] [] [] [] [] [x] [] [] [] [] []    Sit to Supine [] [] [] [] [] [x] [] [] [] [] []    Transfers    Sit to Stand [] [] [] [] [] [x] [] [] [] [] []    Bed to Chair [] [] [] [] [] [] [] [] [] [] []    Stand to Sit [] [] [] [] [] [x] [] [] [] [] []    I=Independent, Mod I=Modified Independent, S=Supervision, SBA=Standby Assistance, CGA=Contact Guard Assistance,   Min=Minimal Assistance, Mod=Moderate Assistance, Max=Maximal Assistance, Total=Total Assistance, NT=Not Tested    BALANCE: Good Fair+ Fair Fair- Poor NT Comments   Sitting Static [] [x] [] [] [] []    Sitting Dynamic [] [x] [] [] [] []              Standing Static [] [] [x] [] [] []    Standing Dynamic [] [] [x] [] [] []      GAIT: I Mod I S SBA CGA Min Mod Max Total  NT x2 Comments:   Level of Assistance [] [] [] [] [x] [x] [] [] [] [] []    Distance 115'    DME Rolling Walker    Gait Quality Very slow, delayed, forward posture, small steps    Weightbearing  Status N/A     I=Independent, Mod I=Modified Independent, S=Supervision, SBA=Standby Assistance, CGA=Contact [employer],   Min=Minimal Assistance, Mod=Moderate Assistance, Max=Maximal Assistance, Total=Total Assistance, NT=Not Tested    PLAN:   FREQUENCY/DURATION: PT Plan of Care: 3 times/week for duration of hospital stay or until stated goals are met, whichever comes first.  TREATMENT:     TREATMENT:   ($$ Therapeutic Activity: 23-37 mins    )  Therapeutic Activity (28 Minutes): Therapeutic activity included Rolling, Supine to Sit, Sit to Supine, Scooting, Transfer Training, Ambulation on level ground, Sitting balance  and Standing balance to improve functional Mobility, Strength, Activity tolerance and balance.     TREATMENT GRID:  N/A    AFTER TREATMENT POSITION/PRECAUTIONS:  Alarm Activated, Bed, Needs within reach and RN notified    INTERDISCIPLINARY COLLABORATION:  RN/PCT and PT/PTA    TOTAL TREATMENT DURATION:  PT Patient Time In/Time Out  Time In: 1110  Time Out: Erin Cha 148, DPT

## 2021-12-15 PROBLEM — G93.41 ACUTE METABOLIC ENCEPHALOPATHY: Status: RESOLVED | Noted: 2021-12-10 | Resolved: 2021-12-15

## 2021-12-15 PROBLEM — R41.0 DELIRIUM: Status: RESOLVED | Noted: 2021-12-09 | Resolved: 2021-12-15

## 2021-12-15 LAB
ANION GAP SERPL CALC-SCNC: 6 MMOL/L (ref 7–16)
BUN SERPL-MCNC: 23 MG/DL (ref 8–23)
CALCIUM SERPL-MCNC: 9.3 MG/DL (ref 8.3–10.4)
CHLORIDE SERPL-SCNC: 108 MMOL/L (ref 98–107)
CO2 SERPL-SCNC: 26 MMOL/L (ref 21–32)
CREAT SERPL-MCNC: 1.21 MG/DL (ref 0.6–1)
ERYTHROCYTE [DISTWIDTH] IN BLOOD BY AUTOMATED COUNT: 12.1 % (ref 11.9–14.6)
GLUCOSE SERPL-MCNC: 94 MG/DL (ref 65–100)
HCT VFR BLD AUTO: 23.1 % (ref 35.8–46.3)
HGB BLD-MCNC: 7.7 G/DL (ref 11.7–15.4)
MCH RBC QN AUTO: 34.1 PG (ref 26.1–32.9)
MCHC RBC AUTO-ENTMCNC: 33.3 G/DL (ref 31.4–35)
MCV RBC AUTO: 102.2 FL (ref 79.6–97.8)
MM INDURATION POC: 0 MM (ref 0–5)
NRBC # BLD: 0 K/UL (ref 0–0.2)
PLATELET # BLD AUTO: 202 K/UL (ref 150–450)
PMV BLD AUTO: 9 FL (ref 9.4–12.3)
POTASSIUM SERPL-SCNC: 3.5 MMOL/L (ref 3.5–5.1)
PPD POC: NEGATIVE NEGATIVE
RBC # BLD AUTO: 2.26 M/UL (ref 4.05–5.2)
SODIUM SERPL-SCNC: 140 MMOL/L (ref 136–145)
WBC # BLD AUTO: 4.1 K/UL (ref 4.3–11.1)

## 2021-12-15 PROCEDURE — 36415 COLL VENOUS BLD VENIPUNCTURE: CPT

## 2021-12-15 PROCEDURE — 97110 THERAPEUTIC EXERCISES: CPT

## 2021-12-15 PROCEDURE — 74011250636 HC RX REV CODE- 250/636: Performed by: EMERGENCY MEDICINE

## 2021-12-15 PROCEDURE — 85027 COMPLETE CBC AUTOMATED: CPT

## 2021-12-15 PROCEDURE — 74011250637 HC RX REV CODE- 250/637: Performed by: EMERGENCY MEDICINE

## 2021-12-15 PROCEDURE — 74011250637 HC RX REV CODE- 250/637: Performed by: INTERNAL MEDICINE

## 2021-12-15 PROCEDURE — 97530 THERAPEUTIC ACTIVITIES: CPT

## 2021-12-15 PROCEDURE — 74011250636 HC RX REV CODE- 250/636: Performed by: INTERNAL MEDICINE

## 2021-12-15 PROCEDURE — 80048 BASIC METABOLIC PNL TOTAL CA: CPT

## 2021-12-15 PROCEDURE — 65270000029 HC RM PRIVATE

## 2021-12-15 RX ORDER — AMLODIPINE BESYLATE 5 MG/1
10 TABLET ORAL DAILY
Status: DISCONTINUED | OUTPATIENT
Start: 2021-12-15 | End: 2021-12-16 | Stop reason: HOSPADM

## 2021-12-15 RX ADMIN — FAMOTIDINE 40 MG: 20 TABLET ORAL at 08:05

## 2021-12-15 RX ADMIN — CARVEDILOL 12.5 MG: 3.12 TABLET, FILM COATED ORAL at 08:05

## 2021-12-15 RX ADMIN — Medication 10 ML: at 05:29

## 2021-12-15 RX ADMIN — AMLODIPINE BESYLATE 10 MG: 5 TABLET ORAL at 08:05

## 2021-12-15 RX ADMIN — SODIUM CHLORIDE 75 ML/HR: 900 INJECTION, SOLUTION INTRAVENOUS at 00:13

## 2021-12-15 RX ADMIN — Medication 10 ML: at 13:33

## 2021-12-15 RX ADMIN — LEVOTHYROXINE SODIUM 150 MCG: 0.05 TABLET ORAL at 05:28

## 2021-12-15 RX ADMIN — EZETIMIBE 10 MG: 10 TABLET ORAL at 08:05

## 2021-12-15 RX ADMIN — VITAMIN D, TAB 1000IU (100/BT) 4000 UNITS: 25 TAB at 08:05

## 2021-12-15 RX ADMIN — ENOXAPARIN SODIUM 40 MG: 100 INJECTION SUBCUTANEOUS at 08:04

## 2021-12-15 RX ADMIN — Medication 10 ML: at 22:02

## 2021-12-15 RX ADMIN — Medication 10 ML: at 05:28

## 2021-12-15 RX ADMIN — SODIUM CHLORIDE 75 ML/HR: 900 INJECTION, SOLUTION INTRAVENOUS at 13:53

## 2021-12-15 RX ADMIN — ATORVASTATIN CALCIUM 80 MG: 40 TABLET, FILM COATED ORAL at 08:04

## 2021-12-15 RX ADMIN — SERTRALINE 150 MG: 100 TABLET, FILM COATED ORAL at 08:05

## 2021-12-15 NOTE — PROGRESS NOTES
Hospitalist Progress Note   Admit Date:  2021  4:54 PM   Name:  Gita Serrano   Age:  77 y.o. Sex:  female  :  1955   MRN:  [id number]   Room:  [facility]    Presenting Complaint: Fall    Reason(s) for Admission: CAP (community acquired pneumonia) [J18.9]     Hospital Course & Interval History:     Please refer to the admission H&P for details of presentation. In summary, Gita Serrano is a 77 y.o. female with medical history significant for hypertension, hyperlipidemia, hypothyroidism who was admitted due to new onset of confusion that has worsened over the last several days. Patient was noted to have worsening right lower lobe infiltrate compared to chest x-ray from 2021. Patient was noted to have acute metabolic encephalopathy but CT head showed no evidence of acute cranial hemorrhage or abnormalities. Patient is aaox3 at times with intermittent confusions likely underlying dementia. Subjective/24 hr Events (12/15/21) :  Patient is seen and examined at bedside. No acute events reported overnight by nursing staff. Awake and alert. AAOx3. Eating breakfast. No acute distress. Patient denies any any cough, fever, chills, chest pains, shortness of breath, n/v, abdominal pain. Review of Systems: 10 point review of systems is otherwise negative with the exception of the elements mentioned above. Assessment & Plan:     RLL Pneumonia   Chest x-ray noted to have worsening right lower lobe infiltrate compared to x-ray from . Completed a course of Abx  -Currently on room air without any signs of hypoxia. Acute Metabolic encephalopathy - improving  CT head negative for any acute intracranial abnormalities. Likely due to ongoing underlying infection  - treat underlying infection as above    NIKKI on CKD 3  Baseline 1 - 1.37 .  Cr peaked at 2.10 and now down trending to 1.32.   -IV fluids while in the hospital. Encouraged PO intake  -holding prinzide due to nikki    Acquired hypothyroidism : synthroid    Essential hypertension:  - carvedilol, amlodipine.   - hold  lisinopril-hydrochlorothiazide due to NIKKI    Mixed hyperlipidemia : On atorvastatin and ezetimibe      Diet:  ADULT DIET Regular; Low Fat/Low Chol/High Fiber/GISELLE  DVT PPx: lovenox  Code status: Full Code     DISPO: Pending pre-cert for SNF    Hospital Problems as of 12/15/2021 Date Reviewed: 12/9/2021          Codes Class Noted - Resolved POA    CKD (chronic kidney disease) stage 3, GFR 30-59 ml/min (HCC) ICD-10-CM: N18.30  ICD-9-CM: 585.3  12/14/2021 - Present Yes        * (Principal) RLL pneumonia ICD-10-CM: J18.9  ICD-9-CM: 782  11/30/2021 - Present Yes        Essential hypertension ICD-10-CM: I10  ICD-9-CM: 401.9  1/4/2017 - Present Yes        Acquired hypothyroidism ICD-10-CM: E03.9  ICD-9-CM: 244.9  10/5/2016 - Present Yes        Macrocytic anemia ICD-10-CM: D53.9  ICD-9-CM: 281.9  10/5/2016 - Present Yes        Mixed hyperlipidemia ICD-10-CM: E78.2  ICD-9-CM: 272.2  10/5/2016 - Present Yes        RESOLVED: Acute metabolic encephalopathy TDQ-37-XP: G93.41  ICD-9-CM: 348.31  12/10/2021 - 12/15/2021 Yes        RESOLVED: Delirium ICD-10-CM: R41.0  ICD-9-CM: 780.09  12/9/2021 - 12/15/2021 Yes        RESOLVED: NIKKI (acute kidney injury) ([facility] [address] ICD-10-CM: N17.9  ICD-9-CM: 584.9  11/7/2015 - 12/15/2021 No              Objective:     Patient Vitals for the past 24 hrs:   Temp Pulse Resp BP SpO2   12/15/21 0804 98.3 °F (36.8 °C) 87 -- (!) 176/92 100 %   12/15/21 0419 98.1 °F (36.7 °C) 73 16 (!) 146/80 100 %   12/14/21 2347 98.3 °F (36.8 °C) 78 16 (!) 143/66 98 %   12/14/21 2012 98.2 °F (36.8 °C) 74 16 138/74 99 %   12/14/21 1616 98.5 °F (36.9 °C) 73 16 128/69 98 %   12/14/21 1208 98.4 °F (36.9 °C) 70 16 115/68 99 %     Oxygen Therapy  O2 Sat (%): 100 % (12/15/21 0804)  Pulse via Oximetry: 65 beats per minute (12/09/21 0314)  O2 Device: None (Room air) (12/14/21 1910)    Estimated body mass index is 24.74 kg/m² as calculated from the following:    Height as of this encounter: 5' 10\" (1.778 m). Weight as of this encounter: 78.2 kg (172 lb 6.4 oz). Intake/Output Summary (Last 24 hours) at 12/15/2021 1110  Last data filed at 12/15/2021 0419  Gross per 24 hour   Intake 150 ml   Output 2050 ml   Net -1900 ml         Physical Exam:     Blood pressure (!) 176/92, pulse 87, temperature 98.3 °F (36.8 °C), resp. rate 16, height 5' 10\" (1.778 m), weight 78.2 kg (172 lb 6.4 oz), SpO2 100 %. General:    Well nourished. No overt distress, lethargic  Head:  Normocephalic, atraumatic  Eyes:  Sclerae appear normal.  Pupils equally round. ENT:  Nares appear normal, no drainage. Moist oral mucosa  Neck:  No restricted ROM. Trachea midline   CV:   RRR. No m/r/g. No jugular venous distension. Lungs:   Scattered crackles. No wheezing. Respirations even, unlabored  Abdomen: Bowel sounds present. Soft, nontender, nondistended. Extremities: No cyanosis or clubbing. No edema  Skin:     No rashes and normal coloration. Warm and dry. Neuro:  CN II-XII grossly intact. A&Ox2-3  Psych:  Normal mood and affect.       I have reviewed ordered lab tests and independently visualized imaging below:    Recent Labs:  Recent Results (from the past 48 hour(s))   CBC W/O DIFF    Collection Time: 12/14/21  5:52 AM   Result Value Ref Range    WBC 4.0 (L) 4.3 - 11.1 K/uL    RBC 2.44 (L) 4.05 - 5.2 M/uL    HGB 8.4 (L) 11.7 - 15.4 g/dL    HCT 24.9 (L) 35.8 - 46.3 %    .0 (H) 79.6 - 97.8 FL    MCH 34.4 (H) 26.1 - 32.9 PG    MCHC 33.7 31.4 - 35.0 g/dL    RDW 11.9 11.9 - 14.6 %    PLATELET 359 516 - 684 K/uL    MPV 9.1 (L) 9.4 - 12.3 FL    ABSOLUTE NRBC 0.00 0.0 - 0.2 K/uL   METABOLIC PANEL, BASIC    Collection Time: 12/14/21  5:52 AM   Result Value Ref Range    Sodium 140 136 - 145 mmol/L    Potassium 3.7 3.5 - 5.1 mmol/L    Chloride 105 98 - 107 mmol/L    CO2 28 21 - 32 mmol/L    Anion gap 7 7 - 16 mmol/L    Glucose 85 65 - 100 mg/dL BUN 20 8 - 23 MG/DL    Creatinine 1.19 (H) 0.6 - 1.0 MG/DL    GFR est AA 58 (L) >60 ml/min/1.73m2    GFR est non-AA 48 (L) >60 ml/min/1.73m2    Calcium 9.5 8.3 - 10.4 MG/DL   PLEASE READ & DOCUMENT PPD TEST IN 24 HRS    Collection Time: 12/14/21 12:02 PM   Result Value Ref Range    PPD Negative Negative    mm Induration 0 0 - 5 mm   CBC W/O DIFF    Collection Time: 12/15/21  5:35 AM   Result Value Ref Range    WBC 4.1 (L) 4.3 - 11.1 K/uL    RBC 2.26 (L) 4.05 - 5.2 M/uL    HGB 7.7 (L) 11.7 - 15.4 g/dL    HCT 23.1 (L) 35.8 - 46.3 %    .2 (H) 79.6 - 97.8 FL    MCH 34.1 (H) 26.1 - 32.9 PG    MCHC 33.3 31.4 - 35.0 g/dL    RDW 12.1 11.9 - 14.6 %    PLATELET 976 925 - 357 K/uL    MPV 9.0 (L) 9.4 - 12.3 FL    ABSOLUTE NRBC 0.00 0.0 - 0.2 K/uL   METABOLIC PANEL, BASIC    Collection Time: 12/15/21  5:35 AM   Result Value Ref Range    Sodium 140 136 - 145 mmol/L    Potassium 3.5 3.5 - 5.1 mmol/L    Chloride 108 (H) 98 - 107 mmol/L    CO2 26 21 - 32 mmol/L    Anion gap 6 (L) 7 - 16 mmol/L    Glucose 94 65 - 100 mg/dL    BUN 23 8 - 23 MG/DL    Creatinine 1.21 (H) 0.6 - 1.0 MG/DL    GFR est AA 57 (L) >60 ml/min/1.73m2    GFR est non-AA 47 (L) >60 ml/min/1.73m2    Calcium 9.3 8.3 - 10.4 MG/DL       All Micro Results     Procedure Component Value Units Date/Time    CULTURE, BLOOD [[id number]] Collected: 12/09/21 0136    Order Status: Completed Specimen: Blood Updated: 12/14/21 0614     Special Requests: --        RIGHT  HAND       Culture result: NO GROWTH 5 DAYS       CULTURE, BLOOD [[id number]] Collected: 12/09/21 0426    Order Status: Completed Specimen: Blood Updated: 12/14/21 0614     Special Requests: --        RIGHT  ARM       Culture result: NO GROWTH 5 DAYS       COVID-19 RAPID TEST [[id number]] Collected: 12/09/21 0136    Order Status: Completed Specimen: Nasopharyngeal Updated: 12/09/21 0223     Specimen source NASAL        COVID-19 rapid test Not detected        Comment:      The specimen is NEGATIVE for SARS-CoV-2, the novel coronavirus associated with COVID-19. A negative result does not rule out COVID-19. This test has been authorized by the FDA under an Emergency Use Authorization (EUA) for use by authorized laboratories. Fact sheet for Healthcare Providers: ConventionUpdate.co.nz  Fact sheet for Patients: ConventionUpdate.co.nz       Methodology: Isothermal Nucleic Acid Amplification               Other Studies:  No results found. Current Meds:  Current Facility-Administered Medications   Medication Dose Route Frequency    amLODIPine (NORVASC) tablet 10 mg  10 mg Oral DAILY    0.9% sodium chloride infusion  75 mL/hr IntraVENous CONTINUOUS    atorvastatin (LIPITOR) tablet 80 mg  80 mg Oral DAILY    carvediloL (COREG) tablet 12.5 mg  12.5 mg Oral DAILY    cholecalciferol (VITAMIN D3) (1000 Units /25 mcg) tablet 4,000 Units  4,000 Units Oral DAILY    famotidine (PEPCID) tablet 40 mg  40 mg Oral DAILY    ezetimibe (ZETIA) tablet 10 mg  10 mg Oral DAILY    levothyroxine (SYNTHROID) tablet 150 mcg  150 mcg Oral ACB    [Held by provider] lisinopril-hydroCHLOROthiazide (PRINZIDE, ZESTORETIC) 20-12.5 mg per tablet 1 Tablet  1 Tablet Oral BID    sertraline (ZOLOFT) tablet 150 mg  150 mg Oral DAILY    sodium chloride (NS) flush 5-40 mL  5-40 mL IntraVENous Q8H    sodium chloride (NS) flush 5-40 mL  5-40 mL IntraVENous PRN    enoxaparin (LOVENOX) injection 40 mg  40 mg SubCUTAneous Q24H    LORazepam (ATIVAN) injection 1 mg  1 mg IntraVENous Q6H PRN    sodium chloride (NS) flush 5-10 mL  5-10 mL IntraVENous Q8H    sodium chloride (NS) flush 5-10 mL  5-10 mL IntraVENous PRN       Signed:  Raegan Alex MD    Part of this note may have been written by using a voice dictation software. The note has been proof read but may still contain some grammatical/other typographical errors.

## 2021-12-15 NOTE — PROGRESS NOTES
RNCM called son to discuss potential  [facility] choices. Son stated that  His cousin recommended [facility]. Referral sent  Will continue to follow for discharge planning needs  Please consult  if any new issues arise  Discharge plan is [facility] pending bed  availability at facility that was requested by patients son.   PPD started on 12/13

## 2021-12-15 NOTE — PROGRESS NOTES
ACUTE OT GOALS:  (Developed with and agreed upon by patient and/or caregiver.)  1. Pt will toilet withCGA   2. Pt will complete functional mobility for ADLs with CGA using AD as needed  3. Pt will complete lower body dressing with CGA using AE as needed  4. Pt will complete grooming and hygiene at sink with CGA  5. Pt will tolerate 23 minutes functional activity with min or fewer rest breaks to promote increased endurance for ADLs  6. Pt will complete upper body bathing and dressing with CGA   7. Pt will demonstrate improved cognition to complete functional tasks with min or fewer cues    OCCUPATIONAL THERAPY: Daily Note OT Treatment Day # 2    Clearnce Oppenheim is a 77 y.o. female   PRIMARY DIAGNOSIS: RLL pneumonia  CAP (community acquired pneumonia) [J18.9]       Payor: [employer] / Plan: [address] / Product Type: Managed Care Medicare /   ASSESSMENT:     REHAB RECOMMENDATIONS: CURRENT LEVEL OF FUNCTION:  (Most Recently Demonstrated)   Recommendation to date pending progress:  Setting:   Short-term Rehab  Equipment:    Rolling Walker Bathing:   Not tested  Dressing:   Not tested  Feeding/Grooming:   Not tested  Toileting:   Not tested  Functional Mobility:   Minimal Assistance     ASSESSMENT:  Ms. Bienvenido Cordova presents in supine upon arrival. Pt pretty confused and needed to be re-oriented to place and situation. Pt was very slow to process any given task. Pt stood with min a and a rolling walker and completed mobility in the room at an extremely slow pace. Pt also with decreased safety and insight into deficits and with poor walker management. Pt finally walked back to the bed and sat down to participate in exercises. Again, pt needed multiple cueing and time to complete everything. Pt still wanted to participate as she verbalized \"I want to do more. \"     SUBJECTIVE:   Ms. Bienvenido Cordova states, \"You just never know what's going to happen to you. \"    SOCIAL HISTORY/LIVING ENVIRONMENT: Home Environment: Private residence  # Steps to Enter: 3  One/Two Story Residence: One story  Living Alone: Yes  Support Systems: Child(annamaria)    OBJECTIVE:     PAIN: VITAL SIGNS: LINES/DRAINS:   Pre Treatment: Pain Screen  Pain Scale 1: Numeric (0 - 10)  Pain Intensity 1: 0  Post Treatment: none   IV and Purewick  O2 Device: None (Room air)     ACTIVITIES OF DAILY LIVING: I Mod I S SBA CGA Min Mod Max Total NT Comments   BASIC ADLs:              Bathing/ Showering [] [] [] [] [] [] [] [] [] [x]    Toileting [] [] [] [] [] [] [] [] [] [x]    Dressing [] [] [] [] [] [] [] [] [] [x]    Feeding [] [] [] [] [] [] [] [] [] [x]    Grooming [] [] [] [] [] [] [] [] [] [x]    Personal Device Care [] [] [] [] [] [] [] [] [] [x]    Functional Mobility [] [] [] [] [] [x] [x] [] [] []    I=Independent, Mod I=Modified Independent, S=Supervision, SBA=Standby Assistance, CGA=Contact Guard Assistance,   Min=Minimal Assistance, Mod=Moderate Assistance, Max=Maximal Assistance, Total=Total Assistance, NT=Not Tested    MOBILITY: I Mod I S SBA CGA Min Mod Max Total  NT x2 Comments:   Supine to sit [] [] [] [] [] [x] [] [] [] [] []    Sit to supine [] [] [] [] [] [x] [] [] [] [] []    Sit to stand [] [] [] [] [] [x] [] [] [] [] []    Bed to chair [] [] [] [] [] [] [] [] [] [x] []    I=Independent, Mod I=Modified Independent, S=Supervision, SBA=Standby Assistance, CGA=Contact Guard Assistance,   Min=Minimal Assistance, Mod=Moderate Assistance, Max=Maximal Assistance, Total=Total Assistance, NT=Not Tested    BALANCE: Good Fair+ Fair Fair- Poor NT Comments   Sitting Static [x] [] [] [] [] []    Sitting Dynamic [x] [] [] [] [] []              Standing Static [] [x] [x] [] [] []    Standing Dynamic [] [] [x] [x] [] []      PLAN:   FREQUENCY/DURATION: OT Plan of Care: 3 times/week for duration of hospital stay or until stated goals are met, whichever comes first.    TREATMENT:   TREATMENT:   ( $$ Therapeutic Activity: 23-37 mins  $$ Therapeutic Exercises: 23-37 mins   )  Therapeutic Activity (30 Minutes): Therapeutic activity included Supine to Sit, Sit to Supine, Transfer Training, Ambulation on level ground, Sitting balance  and Standing balance to improve functional Mobility, Strength, ROM and Activity tolerance. Therapeutic Exercise (24 Minutes): Therapeutic exercises noted below to improve functional activity tolerance, AROM, strength and mobility.      TREATMENT GRID:   Date:  12/15/21 Date:   Date:     Activity/Exercise Parameters Parameters Parameters   Knee flexion/extension 3 sets of 10 reps with max cues     Hip flexion/extension 3 sets of 10 reps with max cues     Hip add/abd 3 sets of 10 reps with max cues     Ankle pumps 3 sets of 10 reps with max cues     Shoulder flexion/extension 3 sets of 10 reps with max cues     Punches 3 sets of 10 reps with max cues                 AFTER TREATMENT POSITION/PRECAUTIONS:  Alarm Activated, Bed, Needs within reach and RN notified    INTERDISCIPLINARY COLLABORATION:  RN/PCT and OT/HINES    TOTAL TREATMENT DURATION:  OT Patient Time In/Time Out  Time In: 1052  Time Out: 616 [address] [place] [address]

## 2021-12-15 NOTE — PROGRESS NOTES
Problem: Falls - Risk of  Goal: *Absence of Falls  Description: Document [place] Fall Risk and appropriate interventions in the flowsheet.   Outcome: Progressing Towards Goal  Note: Fall Risk Interventions:  Mobility Interventions: PT Consult for mobility concerns    Mentation Interventions: Door open when patient unattended    Medication Interventions: Bed/chair exit alarm    Elimination Interventions: Call light in reach

## 2021-12-15 NOTE — PROGRESS NOTES
Problem: Pressure Injury - Risk of  Goal: *Prevention of pressure injury  Description: Document Fredo Scale and appropriate interventions in the flowsheet. Outcome: Progressing Towards Goal  Note: Pressure Injury Interventions:  Sensory Interventions: Assess changes in LOC    Moisture Interventions: Absorbent underpads, Apply protective barrier, creams and emollients    Activity Interventions: PT/OT evaluation    Mobility Interventions: PT/OT evaluation    Nutrition Interventions: Offer support with meals,snacks and hydration    Friction and Shear Interventions: Apply protective barrier, creams and emollients                Problem: Patient Education: Go to Patient Education Activity  Goal: Patient/Family Education  Outcome: Progressing Towards Goal     Problem: Falls - Risk of  Goal: *Absence of Falls  Description: Document Lakshmi Fall Risk and appropriate interventions in the flowsheet.   Outcome: Progressing Towards Goal  Note: Fall Risk Interventions:  Mobility Interventions: Bed/chair exit alarm    Mentation Interventions: Adequate sleep, hydration, pain control, Bed/chair exit alarm, Door open when patient unattended, Eyeglasses and hearing aids    Medication Interventions: Bed/chair exit alarm    Elimination Interventions: Bed/chair exit alarm, Call light in reach              Problem: Patient Education: Go to Patient Education Activity  Goal: Patient/Family Education  Outcome: Progressing Towards Goal     Problem: Patient Education: Go to Patient Education Activity  Goal: Patient/Family Education  Outcome: Progressing Towards Goal     Problem: Patient Education: Go to Patient Education Activity  Goal: Patient/Family Education  Outcome: Progressing Towards Goal

## 2021-12-16 VITALS
DIASTOLIC BLOOD PRESSURE: 65 MMHG | SYSTOLIC BLOOD PRESSURE: 98 MMHG | WEIGHT: 172.4 LBS | OXYGEN SATURATION: 99 % | RESPIRATION RATE: 18 BRPM | TEMPERATURE: 98.6 F | HEIGHT: 70 IN | BODY MASS INDEX: 24.68 KG/M2 | HEART RATE: 77 BPM

## 2021-12-16 LAB
ANION GAP SERPL CALC-SCNC: 5 MMOL/L (ref 7–16)
BUN SERPL-MCNC: 19 MG/DL (ref 8–23)
CALCIUM SERPL-MCNC: 8.9 MG/DL (ref 8.3–10.4)
CHLORIDE SERPL-SCNC: 110 MMOL/L (ref 98–107)
CO2 SERPL-SCNC: 26 MMOL/L (ref 21–32)
CREAT SERPL-MCNC: 1.12 MG/DL (ref 0.6–1)
ERYTHROCYTE [DISTWIDTH] IN BLOOD BY AUTOMATED COUNT: 12 % (ref 11.9–14.6)
GLUCOSE SERPL-MCNC: 88 MG/DL (ref 65–100)
HCT VFR BLD AUTO: 22.4 % (ref 35.8–46.3)
HGB BLD-MCNC: 7.6 G/DL (ref 11.7–15.4)
MCH RBC QN AUTO: 34.4 PG (ref 26.1–32.9)
MCHC RBC AUTO-ENTMCNC: 33.9 G/DL (ref 31.4–35)
MCV RBC AUTO: 101.4 FL (ref 79.6–97.8)
NRBC # BLD: 0 K/UL (ref 0–0.2)
PLATELET # BLD AUTO: 181 K/UL (ref 150–450)
PMV BLD AUTO: 8.6 FL (ref 9.4–12.3)
POTASSIUM SERPL-SCNC: 3.4 MMOL/L (ref 3.5–5.1)
RBC # BLD AUTO: 2.21 M/UL (ref 4.05–5.2)
SODIUM SERPL-SCNC: 141 MMOL/L (ref 136–145)
WBC # BLD AUTO: 3.3 K/UL (ref 4.3–11.1)

## 2021-12-16 PROCEDURE — 74011250637 HC RX REV CODE- 250/637: Performed by: EMERGENCY MEDICINE

## 2021-12-16 PROCEDURE — 85027 COMPLETE CBC AUTOMATED: CPT

## 2021-12-16 PROCEDURE — 74011250637 HC RX REV CODE- 250/637: Performed by: INTERNAL MEDICINE

## 2021-12-16 PROCEDURE — 80048 BASIC METABOLIC PNL TOTAL CA: CPT

## 2021-12-16 PROCEDURE — 97530 THERAPEUTIC ACTIVITIES: CPT

## 2021-12-16 PROCEDURE — 74011250636 HC RX REV CODE- 250/636: Performed by: EMERGENCY MEDICINE

## 2021-12-16 PROCEDURE — 97535 SELF CARE MNGMENT TRAINING: CPT

## 2021-12-16 PROCEDURE — 36415 COLL VENOUS BLD VENIPUNCTURE: CPT

## 2021-12-16 RX ADMIN — FAMOTIDINE 40 MG: 20 TABLET ORAL at 09:49

## 2021-12-16 RX ADMIN — VITAMIN D, TAB 1000IU (100/BT) 4000 UNITS: 25 TAB at 09:50

## 2021-12-16 RX ADMIN — LEVOTHYROXINE SODIUM 150 MCG: 0.05 TABLET ORAL at 05:57

## 2021-12-16 RX ADMIN — Medication 10 ML: at 05:57

## 2021-12-16 RX ADMIN — CARVEDILOL 12.5 MG: 3.12 TABLET, FILM COATED ORAL at 09:49

## 2021-12-16 RX ADMIN — AMLODIPINE BESYLATE 10 MG: 5 TABLET ORAL at 09:49

## 2021-12-16 RX ADMIN — SERTRALINE 150 MG: 100 TABLET, FILM COATED ORAL at 09:48

## 2021-12-16 RX ADMIN — ATORVASTATIN CALCIUM 80 MG: 40 TABLET, FILM COATED ORAL at 09:48

## 2021-12-16 RX ADMIN — ENOXAPARIN SODIUM 40 MG: 100 INJECTION SUBCUTANEOUS at 09:50

## 2021-12-16 RX ADMIN — EZETIMIBE 10 MG: 10 TABLET ORAL at 09:49

## 2021-12-16 NOTE — PROGRESS NOTES
ACUTE PHYSICAL THERAPY GOALS:  (Developed with and agreed upon by patient and/or caregiver. )  LTG:  (1.)Ms. Santiago Lares will move from supine to sit and sit to supine , scoot up and down and roll side to side in bed with INDEPENDENCE within 7 treatment day(s). (2.)Ms. Santiago Lares will transfer from bed to chair and chair to bed with SUPERVISION using the least restrictive device within 7 treatment day(s). (3.)Ms. Santiago Lares will ambulate with SUPERVISION for 200 feet with the least restrictive device within 7 treatment day(s). (4.)Ms. Santiago Lares will perform exercises per HEP in sitting and/or standing to improve strength and mobility within 7 days. (5.)Ms. Santiago Lares will ascend and descend 4 steps with CONTACT GUARD ASSIST using handrail(s) within 7 days. PHYSICAL THERAPY: Daily Note and AM Treatment Day # 4    Radha Teixeira is a 77 y.o. female   PRIMARY DIAGNOSIS: RLL pneumonia  CAP (community acquired pneumonia) [J18.9]         ASSESSMENT:     REHAB RECOMMENDATIONS: CURRENT LEVEL OF FUNCTION:  (Most Recently Demonstrated)   Recommendation to date pending progress:  Setting:   Short-term Rehab  Equipment:    To Be Determined Bed Mobility:   Minimal Assistance  Sit to Stand:   Minimal Assistance  Transfers:   Minimal Assistance  Gait/Mobility:   Minimal Assistance     ASSESSMENT:  Ms. Santiago Lares is admitted from home with weakness, fall, and altered mental status with recent UTI. Unclear PLOF- based on conversation it seems pt lives alone, [facility], and was ambulating with walker during therapy. 12/14- pt presents with continued altered mental status and delayed responses/processing. Needs additional time and cueing for all mobility. She was sitting EOB with HINES working on washing up which took a lot of extra cueing and time. She stood into the walker with minimal assist/CGA and didn't seem to know what to do with it. Decided to walk without the walker and just hold her hand which she seemed to do better with.   She walked about 100ft with cues for taking bigger stride length. She certainly will need a rehab stay and mobility should improve once cognition does. SUBJECTIVE:   Ms. Yamileth Malcolm states, \"I don't know what to do? \"    SOCIAL HISTORY/ LIVING ENVIRONMENT: unclear PLOF/home environment. Based on our conversation it seems like pt lives alone (son recently moved out), has home therapy, and ambulates with walker. She tells me she is able to fix meals and is independent with aDLs.    Home Environment: Private residence  # Steps to Enter: 3  One/Two Story Residence: One story  Living Alone: Yes  Support Systems: Child(annamaria)  OBJECTIVE:     PAIN: VITAL SIGNS: LINES/DRAINS:   Pre Treatment: Pain Screen  Pain Scale 1: FLACC  Pain Intensity 1: 0  Post Treatment: 0/10   IV and Purewick  O2 Device: None (Room air)     MOBILITY: I Mod I S SBA CGA Min Mod Max Total  NT x2 Comments:   Bed Mobility    Rolling [] [] [] [] [] [] [] [] [] [] []    Supine to Sit [] [] [] [] [] [x] [] [] [] [] []    Scooting [] [] [] [] [] [x] [] [] [] [] []    Sit to Supine [] [] [] [] [] [x] [] [] [] [] []    Transfers    Sit to Stand [] [] [] [] [] [x] [] [] [] [] []    Bed to Chair [] [] [] [] [] [] [] [] [] [] []    Stand to Sit [] [] [] [] [] [x] [] [] [] [] []    I=Independent, Mod I=Modified Independent, S=Supervision, SBA=Standby Assistance, CGA=Contact Guard Assistance,   Min=Minimal Assistance, Mod=Moderate Assistance, Max=Maximal Assistance, Total=Total Assistance, NT=Not Tested    BALANCE: Good Fair+ Fair Fair- Poor NT Comments   Sitting Static [] [x] [] [] [] []    Sitting Dynamic [] [x] [] [] [] []              Standing Static [] [] [x] [] [] []    Standing Dynamic [] [] [x] [] [] []      GAIT: I Mod I S SBA CGA Min Mod Max Total  NT x2 Comments:   Level of Assistance [] [] [] [] [x] [x] [] [] [] [] []    Distance 100'    DME HHA     Gait Quality Very slow, delayed, forward posture, small steps    Weightbearing  Status N/A     I=Independent, Mod I=Modified Independent, S=Supervision, SBA=Standby Assistance, CGA=Contact Guard Assistance,   Min=Minimal Assistance, Mod=Moderate Assistance, Max=Maximal Assistance, Total=Total Assistance, NT=Not Tested    PLAN:   FREQUENCY/DURATION: PT Plan of Care: 3 times/week for duration of hospital stay or until stated goals are met, whichever comes first.  TREATMENT:     TREATMENT:   ($$ Therapeutic Activity: 23-37 mins    )  Therapeutic Activity (25 Minutes): Therapeutic activity included Sit to Supine, Scooting, Transfer Training, Ambulation on level ground, Sitting balance  and Standing balance to improve functional Mobility, Strength and Activity tolerance.     TREATMENT GRID:  N/A    AFTER TREATMENT POSITION/PRECAUTIONS:  Alarm Activated, Bed, Needs within reach and RN notified    INTERDISCIPLINARY COLLABORATION:  RN/PCT, PT/PTA and OT/HINES    TOTAL TREATMENT DURATION:  PT Patient Time In/Time Out  Time In: 1050  Time Out: 1115    Hilary Gould PTA

## 2021-12-16 NOTE — PROGRESS NOTES
ACUTE OT GOALS:  (Developed with and agreed upon by patient and/or caregiver.)  1. Pt will toilet withCGA   2. Pt will complete functional mobility for ADLs with CGA using AD as needed  3. Pt will complete lower body dressing with CGA using AE as needed  4. Pt will complete grooming and hygiene at sink with CGA  5. Pt will tolerate 23 minutes functional activity with min or fewer rest breaks to promote increased endurance for ADLs  6. Pt will complete upper body bathing and dressing with CGA   7. Pt will demonstrate improved cognition to complete functional tasks with min or fewer cues    OCCUPATIONAL THERAPY: Daily Note OT Treatment Day # 3    Francesco Sy is a 77 y.o. female   PRIMARY DIAGNOSIS: RLL pneumonia  CAP (community acquired pneumonia) [J18.9]       Payor: [employer]PLETE / Plan: Tom Moreno / Product Type: [employer] [facility] [employer] Medicare /   ASSESSMENT:     REHAB RECOMMENDATIONS: CURRENT LEVEL OF FUNCTION:  (Most Recently Demonstrated)   Recommendation to date pending progress:  Setting:   Short-term Rehab  Equipment:    To Be Determined Bathing:   Minimal Assistance  Dressing:   Maximal Assistance  Feeding/Grooming:   Not tested  Toileting:   Not tested  Functional Mobility:   Minimal Assistance     ASSESSMENT:  Ms. Gayatri Murray presents in supine upon arrival. Pt confused and still very slow to process any given task. Pt completed supine to sit with min a but she needed constant cueing d/t forgetting what she was doing. Pt even voiced that she was forgetting. Pt sat edge of bed and participated in bathing and dressing activities. Pt did not need a lot of physical assist, but again she just had a very tough time staying on task and sequencing. The same with mobility. Pt only needed min a but max continuous verbal cues. Good effort. Continue POC. SUBJECTIVE:   Ms. Gayatir Murray states, \"I keep forgetting what I'm doing. \"    SOCIAL HISTORY/LIVING ENVIRONMENT:   Home Environment: Private PCT reported hypotension. BP:84/58. Recheck BP is 90/61.No c/o dizziness. Notified Manuela, LACHELLE. 250mL NS bolus hung per Manuela.   residence  # Steps to Enter: 3  One/Two Story Residence: One story  Living Alone: Yes  Support Systems: Child(annamaria)    OBJECTIVE:     PAIN: VITAL SIGNS: LINES/DRAINS:   Pre Treatment: Pain Screen  Pain Scale 1: FLACC  Pain Intensity 1: 0  Post Treatment: none   IV and Purewick  O2 Device: None (Room air)     ACTIVITIES OF DAILY LIVING: I Mod I S SBA CGA Min Mod Max Total NT Comments   BASIC ADLs:              Bathing/ Showering [] [] [] [] [] [x] [] [] [] []    Toileting [] [] [] [] [] [] [] [] [x] [x]    Dressing [] [] [] [] [] [] [] [x] [] []    Feeding [] [] [] [] [] [] [] [] [] [x]    Grooming [] [] [] [] [] [] [] [] [] [x]    Personal Device Care [] [] [] [] [] [] [] [] [] [x]    Functional Mobility [] [] [] [] [] [x] [] [] [] []    I=Independent, Mod I=Modified Independent, S=Supervision, SBA=Standby Assistance, CGA=Contact Guard Assistance,   Min=Minimal Assistance, Mod=Moderate Assistance, Max=Maximal Assistance, Total=Total Assistance, NT=Not Tested    MOBILITY: I Mod I S SBA CGA Min Mod Max Total  NT x2 Comments:   Supine to sit [] [] [] [] [] [x] [] [] [] [] []    Sit to supine [] [] [] [] [] [x] [] [] [] [] []    Sit to stand [] [] [] [] [] [x] [] [] [] [] []    Bed to chair [] [] [] [] [] [] [] [] [] [x] []    I=Independent, Mod I=Modified Independent, S=Supervision, SBA=Standby Assistance, CGA=Contact Guard Assistance,   Min=Minimal Assistance, Mod=Moderate Assistance, Max=Maximal Assistance, Total=Total Assistance, NT=Not Tested    BALANCE: Good Fair+ Fair Fair- Poor NT Comments   Sitting Static [x] [] [] [] [] []    Sitting Dynamic [x] [] [] [] [] []              Standing Static [] [x] [x] [] [] []    Standing Dynamic [] [] [x] [x] [] []      PLAN:   FREQUENCY/DURATION: OT Plan of Care: 3 times/week for duration of hospital stay or until stated goals are met, whichever comes first.    TREATMENT:   TREATMENT:   ($$ Self Care/Home Management: 23-37 mins$$ Therapeutic Activity: 8-22 mins )  Co-Treatment PT/OT necessary due to patient's decreased overall endurance/tolerance levels, as well as need for high level skilled assistance to complete functional transfers/mobility and functional tasks  Therapeutic Activity (15 Minutes): Therapeutic activity included Supine to Sit, Sit to Supine, Transfer Training, Ambulation on level ground, Sitting balance  and Standing balance to improve functional Mobility, Strength, ROM and Activity tolerance. Self Care (30 Minutes): Self care including Upper Body Bathing, Lower Body Bathing, Upper Body Dressing and Lower Body Dressing to increase independence and decrease level of assistance required.     TREATMENT GRID:   Date:  12/15/21 Date:   Date:     Activity/Exercise Parameters Parameters Parameters   Knee flexion/extension 3 sets of 10 reps with max cues     Hip flexion/extension 3 sets of 10 reps with max cues     Hip add/abd 3 sets of 10 reps with max cues     Ankle pumps 3 sets of 10 reps with max cues     Shoulder flexion/extension 3 sets of 10 reps with max cues     Punches 3 sets of 10 reps with max cues                 AFTER TREATMENT POSITION/PRECAUTIONS:  Alarm Activated, Bed, Needs within reach and RN notified    INTERDISCIPLINARY COLLABORATION:  RN/PCT and OT/HINES    TOTAL TREATMENT DURATION:  OT Patient Time In/Time Out  Time In: 1035  Time Out: [address] Trace Davey

## 2021-12-16 NOTE — DISCHARGE SUMMARY
Hospitalist Discharge Summary   Admit Date:  2021  4:54 PM   DC Note date: 2021  Name:  Daniel Snowden   Age:  77 y.o. Sex:  female  :  1955   MRN:  [id number]   Room:  [facility] [place]  PCP:  Baldomero Granger NP    Presenting Complaint: Fall    Initial Admission Diagnosis: CAP (community acquired pneumonia) [J18.9]     Problem List for this Hospitalization:  Hospital Problems as of 2021 Date Reviewed: 2021          Codes Class Noted - Resolved POA    CKD (chronic kidney disease) stage 3, GFR 30-59 ml/min ([facility] [address] ICD-10-CM: N18.30  ICD-9-CM: 585.3  2021 - Present Yes        * (Principal) RLL pneumonia ICD-10-CM: J18.9  ICD-9-CM: 486  2021 - Present Yes        Essential hypertension ICD-10-CM: I10  ICD-9-CM: 401.9  2017 - Present Yes        Acquired hypothyroidism ICD-10-CM: E03.9  ICD-9-CM: 244.9  10/5/2016 - Present Yes        Macrocytic anemia ICD-10-CM: D53.9  ICD-9-CM: 281.9  10/5/2016 - Present Yes        Mixed hyperlipidemia ICD-10-CM: E78.2  ICD-9-CM: 272.2  10/5/2016 - Present Yes        RESOLVED: Acute metabolic encephalopathy EDIE-95-DL: G93.41  ICD-9-CM: 348.31  12/10/2021 - 12/15/2021 Yes        RESOLVED: Delirium ICD-10-CM: R41.0  ICD-9-CM: 780.09  2021 - 12/15/2021 Yes        RESOLVED: NIKKI (acute kidney injury) ([facility] ICD-10-CM: N17.9  ICD-9-CM: 584.9  2015 - 12/15/2021 No            Did Patient have Sepsis (YES OR NO): No    Hospital Course:  Please refer to the admission H&P for details of presentation. In summary, Daniel Snowden is a 77 y.o. female with past medical history significant for hypertension, hyperlipidemia, hypothyroidism who was admitted due to new onset of confusion that has worsened over the last several days.  Patient was noted to have worsening right lower lobe infiltrate compared to chest x-ray from 2021.    Patient was noted to have acute metabolic encephalopathy but CT head showed no evidence of acute cranial hemorrhage or abnormalities. Patient is aaox3 at times with intermittent confusions likely underlying dementia. Patient had completed a course of antibiotics for right-sided pneumonia. Patient remained hemodynamically stable and on room air. He was evaluated by PT and recommending SNF. Patient is medically stable for discharge. Patient is to continue taking medications as prescribed and to follow up with PCP on discharge. Patient is instructed to to call a physician or return to ED if any concerns/symptoms worsened. Discharge summary and encounter summary was sent to PCP electronically via \"Comm Mgt\" link in [facility], if possible. Disposition: SNF  Diet: ADULT DIET Regular; Low Fat/Low Chol/High Fiber/GISELLE  Code Status: Full Code    Follow Up Orders:  No orders of the defined types were placed in this encounter. Follow-up Information     Follow up With Specialties Details Why [address] [place] [address]  [phone]          Follow up labs/diagnostics (ultimately defer to outpatient provider):  As above    Time spent in patient discharge and coordination 35 minutes. Plan was discussed with jennifer. All questions answered. Patient was stable at time of discharge. Instructions given to call a physician or return if any concerns. Discharge Info:   Current Discharge Medication List      CONTINUE these medications which have NOT CHANGED    Details   atorvastatin (LIPITOR) 80 mg tablet Take 1 Tablet by mouth daily for 30 doses. TAKE 1 TABLET BY MOUTH EVERY NIGHT  Qty: 30 Tablet, Refills: 0    Comments: **Patient requests 90 days supply**      levothyroxine (SYNTHROID) 150 mcg tablet Take 1 Tablet by mouth Daily (before breakfast) for 30 days. Qty: 30 Tablet, Refills: 0      famotidine (PEPCID) 40 mg tablet Take 1 Tab by mouth daily.   Qty: 90 Tab, Refills: 0      felodipine (PLENDIL SR) 10 mg 24 hr tablet Take 1 Tab by mouth daily. Qty: 90 Tab, Refills: 0      carvediloL (COREG) 12.5 mg tablet TAKE 1 TABLET BY MOUTH EVERY MORNING  Qty: 90 Tab, Refills: 0    Comments: **Patient requests 90 days supply**      sertraline (ZOLOFT) 100 mg tablet TAKE 1 AND 1/2 TABLETS BY MOUTH EVERY DAY  Qty: 135 Tab, Refills: 0      ezetimibe (ZETIA) 10 mg tablet TAKE 1 TABLET BY MOUTH DAILY  Qty: 90 Tab, Refills: 3    Comments: **Patient requests 90 days supply**      exemestane (AROMASIN) 25 mg tablet 25 mg every morning. Indications: prevention of breast cancer in high risk women  Refills: 3      MV, MIN #36/IRON,CARBONYL/FA (GERITOL COMPLETE PO) Take 1 Tab by mouth every other day. Pt states also has liquid form. cholecalciferol, vitamin D3, (VITAMIN D3) 4,000 unit cap Take 1 Cap by mouth every other day. STOP taking these medications       lisinopril-hydroCHLOROthiazide (PRINZIDE, ZESTORETIC) 20-12.5 mg per tablet Comments:   Reason for Stopping:               Procedures done this admission:  * No surgery found *    Consults this admission:  None    Echocardiogram/EKG results:  No results found for this or any previous visit.        EKG Results     Procedure 720 Value Units Date/Time    EKG [[id number]] Collected: 12/08/21 1713    Order Status: Completed Updated: 12/09/21 0714     Ventricular Rate 67 BPM      Atrial Rate 67 BPM      P-R Interval 158 ms      QRS Duration 82 ms      Q-T Interval 440 ms      QTC Calculation (Bezet) 464 ms      Calculated P Axis 67 degrees      Calculated R Axis 76 degrees      Calculated T Axis 75 degrees      Diagnosis --     Sinus rhythm with occasional Premature ventricular complexes  Possible Anterior infarct , age undetermined  Abnormal ECG  When compared with ECG of 06-NOV-2015 21:10,  Premature ventricular complexes are now Present  Borderline criteria for Anterior infarct are now Present  Confirmed by Warren Tapia MD (), Maury Martinez ([id number]) on 12/9/2021 7:14:24 AM Diagnostic Imaging/Tests:   CT HEAD WO CONT    Result Date: 12/8/2021  CT head and cervical spine without contrast HISTORY: Complaints of fall in bathroom and \"hit face on wall\" Neck pain 8/10, denies LOC or dizziness. States UTI diagnosed last Tuesday, not currently taking antibiotics. GCS 15. AandOx3, disoriented to location TECHNIQUE: 5 mm axial images were obtained from the skull base to the vertex without intravenous contrast. Helically acquired images were obtained spine reconstructed at 2.5 mm thickness. Sagittal and coronal reformatted images were submitted. All CT scans at this facility are performed using dose optimization technique as appropriate to a performed exam, to include on automated exposure control, adjustment of the mA and/or KV according to patient's size (including appropriate matching for site-specific examinations), or use of iterative reconstruction technique. COMPARISON: Head CT 11/30/2021. CT head without contrast: Findings: The ventricles and sulci are mildly prominent. There are no extra-axial fluid collections. No evidence of acute intracranial hemorrhage or mass effect is identified. There is no evidence to suggest an acute major territorial infarct. Patchy areas of decreased attenuation are present within the supratentorial white matter. These are nonspecific findings but would be most compatible with mild  chronic small vessel ischemic change. The bony calvarium is intact. The visualized mastoid air cells and paranasal sinuses are well pneumatized and aerated. CT cervical spine without contrast: Findings: The vertebral body height and alignment are well maintained. There is mild disc space narrowing at C5-6. Small endplate spurs are present at C4-5, C5-6 and C6-7. There is no evidence of acute fracture or subluxation. The prevertebral soft tissues are unremarkable. Carotid atherosclerotic changes are present. 1. No evidence of acute intracranial hemorrhage.  2. No evidence of acute cervical spine fracture. 3. Cervical spondylosis. 4. Mild chronic small vessel ischemic change. CT SPINE CERV WO CONT    Result Date: 12/8/2021  CT head and cervical spine without contrast HISTORY: Complaints of fall in bathroom and \"hit face on wall\" Neck pain 8/10, denies LOC or dizziness. States UTI diagnosed last Tuesday, not currently taking antibiotics. GCS 15. AandOx3, disoriented to location TECHNIQUE: 5 mm axial images were obtained from the skull base to the vertex without intravenous contrast. Helically acquired images were obtained spine reconstructed at 2.5 mm thickness. Sagittal and coronal reformatted images were submitted. All CT scans at this facility are performed using dose optimization technique as appropriate to a performed exam, to include on automated exposure control, adjustment of the mA and/or KV according to patient's size (including appropriate matching for site-specific examinations), or use of iterative reconstruction technique. COMPARISON: Head CT 11/30/2021. CT head without contrast: Findings: The ventricles and sulci are mildly prominent. There are no extra-axial fluid collections. No evidence of acute intracranial hemorrhage or mass effect is identified. There is no evidence to suggest an acute major territorial infarct. Patchy areas of decreased attenuation are present within the supratentorial white matter. These are nonspecific findings but would be most compatible with mild  chronic small vessel ischemic change. The bony calvarium is intact. The visualized mastoid air cells and paranasal sinuses are well pneumatized and aerated. CT cervical spine without contrast: Findings: The vertebral body height and alignment are well maintained. There is mild disc space narrowing at C5-6. Small endplate spurs are present at C4-5, C5-6 and C6-7. There is no evidence of acute fracture or subluxation. The prevertebral soft tissues are unremarkable.  Carotid atherosclerotic changes are present. 1. No evidence of acute intracranial hemorrhage. 2. No evidence of acute cervical spine fracture. 3. Cervical spondylosis. 4. Mild chronic small vessel ischemic change. XR CHEST PORT    Result Date: 12/8/2021  Chest portable CLINICAL INDICATION: Acute severe weakness with syncope, fall injury COMPARISON: 11/30/2021 TECHNIQUE: single AP portable view chest at 5:41 PM upright FINDINGS: Slightly increased mild groundglass and reticular opacity at the right base. Stable mediastinal and hilar contours with cardiac enlargement, CABG again evident. Stable left portacatheter with tip projecting over SVC. No pneumothorax. Right axillary surgical clips again noted. Bone density appears borderline low. Patient is rotated. Lung volumes are low normal.     Mild right basilar infiltrate. All Micro Results     Procedure Component Value Units Date/Time    CULTURE, BLOOD [[id number]] Collected: 12/09/21 0136    Order Status: Completed Specimen: Blood Updated: 12/14/21 0614     Special Requests: --        RIGHT  HAND       Culture result: NO GROWTH 5 DAYS       CULTURE, BLOOD [[id number]] Collected: 12/09/21 0426    Order Status: Completed Specimen: Blood Updated: 12/14/21 0614     Special Requests: --        RIGHT  ARM       Culture result: NO GROWTH 5 DAYS       COVID-19 RAPID TEST [[id number]] Collected: 12/09/21 0136    Order Status: Completed Specimen: Nasopharyngeal Updated: 12/09/21 0223     Specimen source NASAL        COVID-19 rapid test Not detected        Comment:      The specimen is NEGATIVE for SARS-CoV-2, the novel coronavirus associated with COVID-19. A negative result does not rule out COVID-19. This test has been authorized by the FDA under an Emergency Use Authorization (EUA) for use by authorized laboratories.         Fact sheet for Healthcare Providers: kstattoo.com  Fact sheet for Patients: kstattoo.com       Methodology: Isothermal Nucleic Acid Amplification               Labs: Results:       BMP, Mg, Phos Recent Labs     12/16/21  0544 12/15/21  0535 12/14/21  0552    140 140   K 3.4* 3.5 3.7   * 108* 105   CO2 26 26 28   AGAP 5* 6* 7   BUN 19 23 20   CREA 1.12* 1.21* 1.19*   CA 8.9 9.3 9.5   GLU 88 94 85      CBC Recent Labs     12/16/21  0544 12/15/21  0535 12/14/21  0552   WBC 3.3* 4.1* 4.0*   RBC 2.21* 2.26* 2.44*   HGB 7.6* 7.7* 8.4*   HCT 22.4* 23.1* 24.9*    202 225      LFT No results for input(s): ALT, TBIL, AP, TP, ALB, GLOB, AGRAT in the last 72 hours.     No lab exists for component: SGOT, GPT   Cardiac Testing Lab Results   Component Value Date/Time    BNP 61 (H) 02/09/2019 04:38 PM      Coagulation Tests Lab Results   Component Value Date/Time    Prothrombin time 12.6 (H) 11/06/2015 09:10 PM    INR 1.2 11/06/2015 09:10 PM      A1c Lab Results   Component Value Date/Time    Hemoglobin A1c 5.7 (H) 02/16/2021 10:06 AM    Hemoglobin A1c 5.8 (H) 02/07/2018 10:09 AM    Hemoglobin A1c 5.9 (H) 07/13/2017 08:50 AM      Lipid Panel Lab Results   Component Value Date/Time    Cholesterol, total 171 02/16/2021 10:06 AM    HDL Cholesterol 38 (L) 02/16/2021 10:06 AM    LDL, calculated 113 (H) 02/16/2021 10:06 AM    LDL, calculated 91 03/05/2020 12:36 PM    VLDL, calculated 20 02/16/2021 10:06 AM    VLDL, calculated 22 03/05/2020 12:36 PM    Triglyceride 112 02/16/2021 10:06 AM      Thyroid Panel Lab Results   Component Value Date/Time    TSH 69.700 (H) 11/30/2021 01:24 PM    TSH 0.012 (L) 02/16/2021 10:06 AM        Most Recent UA Lab Results   Component Value Date/Time    Color YELLOW 12/09/2021 01:36 AM    Appearance CLEAR 12/09/2021 01:36 AM    pH (UA) 5.5 12/09/2021 01:36 AM    Protein TRACE (A) 12/09/2021 01:36 AM    Glucose Negative 12/09/2021 01:36 AM    Ketone Negative 12/09/2021 01:36 AM    Bilirubin Negative 12/09/2021 01:36 AM    Blood Negative 12/09/2021 01:36 AM    Urobilinogen 0.2 12/09/2021 01:36 AM Nitrites Negative 12/09/2021 01:36 AM    Leukocyte Esterase Negative 12/09/2021 01:36 AM    WBC 0-3 12/09/2021 01:36 AM    RBC 0-3 12/09/2021 01:36 AM    Epithelial cells 0-3 12/09/2021 01:36 AM    Bacteria 0 12/09/2021 01:36 AM    Casts 10-20 12/09/2021 01:36 AM          All Labs from Last 24 Hrs:  Recent Results (from the past 24 hour(s))   CBC W/O DIFF    Collection Time: 12/16/21  5:44 AM   Result Value Ref Range    WBC 3.3 (L) 4.3 - 11.1 K/uL    RBC 2.21 (L) 4.05 - 5.2 M/uL    HGB 7.6 (L) 11.7 - 15.4 g/dL    HCT 22.4 (L) 35.8 - 46.3 %    .4 (H) 79.6 - 97.8 FL    MCH 34.4 (H) 26.1 - 32.9 PG    MCHC 33.9 31.4 - 35.0 g/dL    RDW 12.0 11.9 - 14.6 %    PLATELET 556 788 - 374 K/uL    MPV 8.6 (L) 9.4 - 12.3 FL    ABSOLUTE NRBC 0.00 0.0 - 0.2 K/uL   METABOLIC PANEL, BASIC    Collection Time: 12/16/21  5:44 AM   Result Value Ref Range    Sodium 141 136 - 145 mmol/L    Potassium 3.4 (L) 3.5 - 5.1 mmol/L    Chloride 110 (H) 98 - 107 mmol/L    CO2 26 21 - 32 mmol/L    Anion gap 5 (L) 7 - 16 mmol/L    Glucose 88 65 - 100 mg/dL    BUN 19 8 - 23 MG/DL    Creatinine 1.12 (H) 0.6 - 1.0 MG/DL    GFR est AA >60 >60 ml/min/1.73m2    GFR est non-AA 52 (L) >60 ml/min/1.73m2    Calcium 8.9 8.3 - 10.4 MG/DL       Current Med List in Hospital:   Current Facility-Administered Medications   Medication Dose Route Frequency    amLODIPine (NORVASC) tablet 10 mg  10 mg Oral DAILY    0.9% sodium chloride infusion  75 mL/hr IntraVENous CONTINUOUS    atorvastatin (LIPITOR) tablet 80 mg  80 mg Oral DAILY    carvediloL (COREG) tablet 12.5 mg  12.5 mg Oral DAILY    cholecalciferol (VITAMIN D3) (1000 Units /25 mcg) tablet 4,000 Units  4,000 Units Oral DAILY    famotidine (PEPCID) tablet 40 mg  40 mg Oral DAILY    ezetimibe (ZETIA) tablet 10 mg  10 mg Oral DAILY    levothyroxine (SYNTHROID) tablet 150 mcg  150 mcg Oral ACB    [Held by provider] lisinopril-hydroCHLOROthiazide (PRINZIDE, ZESTORETIC) 20-12.5 mg per tablet 1 Tablet  1 Tablet Oral BID    sertraline (ZOLOFT) tablet 150 mg  150 mg Oral DAILY    sodium chloride (NS) flush 5-40 mL  5-40 mL IntraVENous Q8H    sodium chloride (NS) flush 5-40 mL  5-40 mL IntraVENous PRN    enoxaparin (LOVENOX) injection 40 mg  40 mg SubCUTAneous Q24H    LORazepam (ATIVAN) injection 1 mg  1 mg IntraVENous Q6H PRN    sodium chloride (NS) flush 5-10 mL  5-10 mL IntraVENous Q8H    sodium chloride (NS) flush 5-10 mL  5-10 mL IntraVENous PRN       Allergies   Allergen Reactions    Codeine Rash     Immunization History   Administered Date(s) Administered    Influenza High Dose Vaccine PF 10/09/2019    Influenza Vaccine 10/05/2016    Influenza Vaccine (Quad) PF (>6 Mo Flulaval, Fluarix, and >3 Yrs Afluria, Fluzone 24828) 11/15/2018    Pneumococcal Conjugate (PCV-13) 08/07/2018    TB Skin Test (PPD) Intradermal 11/30/2021, 12/13/2021    Zoster Recombinant 12/26/2018       Recent Vital Data:  Patient Vitals for the past 24 hrs:   Temp Pulse Resp BP SpO2   12/16/21 1155 98.6 °F (37 °C) 77 -- 98/65 99 %   12/16/21 0730 98.6 °F (37 °C) 87 18 (!) 164/94 99 %   12/16/21 0411 98.6 °F (37 °C) 74 18 (!) 143/86 98 %   12/16/21 0017 99.2 °F (37.3 °C) 71 18 (!) 152/78 98 %   12/15/21 1955 98.7 °F (37.1 °C) 77 18 136/69 98 %   12/15/21 1607 98.2 °F (36.8 °C) 64 -- 132/76 100 %     Oxygen Therapy  O2 Sat (%): 99 % (12/16/21 1155)  Pulse via Oximetry: 65 beats per minute (12/09/21 0314)  O2 Device: None (Room air) (12/16/21 1155)    Estimated body mass index is 24.74 kg/m² as calculated from the following:    Height as of this encounter: 5' 10\" (1.778 m). Weight as of this encounter: 78.2 kg (172 lb 6.4 oz). Intake/Output Summary (Last 24 hours) at 12/16/2021 1432  Last data filed at 12/16/2021 0411  Gross per 24 hour   Intake --   Output 1350 ml   Net -1350 ml         Physical Exam:    General:          Well nourished.   No overt distress, lethargic  Head:               Normocephalic, atraumatic  Eyes: Sclerae appear normal.  Pupils equally round. ENT:                Nares appear normal, no drainage. Moist oral mucosa  Neck:               No restricted ROM. Trachea midline   CV:                  RRR. No m/r/g. No jugular venous distension. Lungs:             Scattered crackles. No wheezing. Respirations even, unlabored  Abdomen: Bowel sounds present. Soft, nontender, nondistended. Extremities:     No cyanosis or clubbing. No edema  Skin:                No rashes and normal coloration. Warm and dry. Neuro:             CN II-XII grossly intact. A&Ox2-3  Psych:             Normal mood and affect. Signed:  Darshan Spain MD    Part of this note may have been written by using a voice dictation software. The note has been proof read but may still contain some grammatical/other typographical errors.

## 2021-12-16 NOTE — PROGRESS NOTES
Hospitalist Progress Note   Admit Date:  2021  4:54 PM   Name:  Cliff Mendez   Age:  77 y.o. Sex:  female  :  1955   MRN:  [id number]   Room:  [facility]/    Presenting Complaint: Fall    Reason(s) for Admission: CAP (community acquired pneumonia) [J18.9]     Hospital Course & Interval History:     Please refer to the admission H&P for details of presentation. In summary, Cliff Mendez is a 77 y.o. female with medical history significant for hypertension, hyperlipidemia, hypothyroidism who was admitted due to new onset of confusion that has worsened over the last several days. Patient was noted to have worsening right lower lobe infiltrate compared to chest x-ray from 2021. Patient was noted to have acute metabolic encephalopathy but CT head showed no evidence of acute cranial hemorrhage or abnormalities. Patient is aaox3 at times with intermittent confusions likely underlying dementia. Subjective/24 hr Events (21) : Patient is seen and examined at bedside. No acute events reported overnight by nursing staff. Awake and alert. AAOx2-3 which is at baseline. Eating breakfast. No acute distress. Patient denies any any cough, fever, chills, chest pains, shortness of breath, n/v, abdominal pain. Review of Systems: 10 point review of systems is otherwise negative with the exception of the elements mentioned above. Assessment & Plan:     RLL Pneumonia   Chest x-ray noted to have worsening right lower lobe infiltrate compared to x-ray from . Completed a course of Abx  -Currently on room air without any signs of hypoxia. Acute Metabolic encephalopathy - improving  CT head negative for any acute intracranial abnormalities. Likely due to ongoing underlying infection  - treat underlying infection as above    NIKKI on CKD 3  Baseline 1 - 1.37 .  Cr peaked at 2.10 and now down trending to 1.32.   -IV fluids while in the hospital. Encouraged PO intake  -holding prinzide due to nikki    Acquired hypothyroidism : synthroid    Essential hypertension:  - carvedilol, amlodipine. - hold  lisinopril-hydrochlorothiazide due to NIKKI    Mixed hyperlipidemia : On atorvastatin and ezetimibe      Diet:  ADULT DIET Regular; Low Fat/Low Chol/High Fiber/GISELLE  DVT PPx: lovenox  Code status: Full Code     DISPO: Pending insurance approval for SNF placement.       Hospital Problems as of 12/16/2021 Date Reviewed: 12/9/2021          Codes Class Noted - Resolved POA    CKD (chronic kidney disease) stage 3, GFR 30-59 ml/min (HCC) ICD-10-CM: N18.30  ICD-9-CM: 585.3  12/14/2021 - Present Yes        * (Principal) RLL pneumonia ICD-10-CM: J18.9  ICD-9-CM: 486  11/30/2021 - Present Yes        Essential hypertension ICD-10-CM: I10  ICD-9-CM: 401.9  1/4/2017 - Present Yes        Acquired hypothyroidism ICD-10-CM: E03.9  ICD-9-CM: 244.9  10/5/2016 - Present Yes        Macrocytic anemia ICD-10-CM: D53.9  ICD-9-CM: 281.9  10/5/2016 - Present Yes        Mixed hyperlipidemia ICD-10-CM: E78.2  ICD-9-CM: 272.2  10/5/2016 - Present Yes        RESOLVED: Acute metabolic encephalopathy VSQ-40-YW: G93.41  ICD-9-CM: 348.31  12/10/2021 - 12/15/2021 Yes        RESOLVED: Delirium ICD-10-CM: R41.0  ICD-9-CM: 780.09  12/9/2021 - 12/15/2021 Yes        RESOLVED: NIKKI (acute kidney injury) ([facility] [address] ICD-10-CM: N17.9  ICD-9-CM: 584.9  11/7/2015 - 12/15/2021 No              Objective:     Patient Vitals for the past 24 hrs:   Temp Pulse Resp BP SpO2   12/16/21 1155 98.6 °F (37 °C) 77 -- 98/65 99 %   12/16/21 0730 98.6 °F (37 °C) 87 18 (!) 164/94 99 %   12/16/21 0411 98.6 °F (37 °C) 74 18 (!) 143/86 98 %   12/16/21 0017 99.2 °F (37.3 °C) 71 18 (!) 152/78 98 %   12/15/21 1955 98.7 °F (37.1 °C) 77 18 136/69 98 %   12/15/21 1607 98.2 °F (36.8 °C) 64 -- 132/76 100 %     Oxygen Therapy  O2 Sat (%): 99 % (12/16/21 1155)  Pulse via Oximetry: 65 beats per minute (12/09/21 0314)  O2 Device: None (Room air) (12/16/21 5025)    Estimated body mass index is 24.74 kg/m² as calculated from the following:    Height as of this encounter: 5' 10\" (1.778 m). Weight as of this encounter: 78.2 kg (172 lb 6.4 oz). Intake/Output Summary (Last 24 hours) at 12/16/2021 1337  Last data filed at 12/16/2021 0411  Gross per 24 hour   Intake --   Output 1350 ml   Net -1350 ml         Physical Exam:     Blood pressure 98/65, pulse 77, temperature 98.6 °F (37 °C), resp. rate 18, height 5' 10\" (1.778 m), weight 78.2 kg (172 lb 6.4 oz), SpO2 99 %. General:    Well nourished. No overt distress, lethargic  Head:  Normocephalic, atraumatic  Eyes:  Sclerae appear normal.  Pupils equally round. ENT:  Nares appear normal, no drainage. Moist oral mucosa  Neck:  No restricted ROM. Trachea midline   CV:   RRR. No m/r/g. No jugular venous distension. Lungs:   Scattered crackles. No wheezing. Respirations even, unlabored  Abdomen: Bowel sounds present. Soft, nontender, nondistended. Extremities: No cyanosis or clubbing. No edema  Skin:     No rashes and normal coloration. Warm and dry. Neuro:  CN II-XII grossly intact. A&Ox2-3  Psych:  Normal mood and affect.       I have reviewed ordered lab tests and independently visualized imaging below:    Recent Labs:  Recent Results (from the past 48 hour(s))   CBC W/O DIFF    Collection Time: 12/15/21  5:35 AM   Result Value Ref Range    WBC 4.1 (L) 4.3 - 11.1 K/uL    RBC 2.26 (L) 4.05 - 5.2 M/uL    HGB 7.7 (L) 11.7 - 15.4 g/dL    HCT 23.1 (L) 35.8 - 46.3 %    .2 (H) 79.6 - 97.8 FL    MCH 34.1 (H) 26.1 - 32.9 PG    MCHC 33.3 31.4 - 35.0 g/dL    RDW 12.1 11.9 - 14.6 %    PLATELET 493 647 - 814 K/uL    MPV 9.0 (L) 9.4 - 12.3 FL    ABSOLUTE NRBC 0.00 0.0 - 0.2 K/uL   METABOLIC PANEL, BASIC    Collection Time: 12/15/21  5:35 AM   Result Value Ref Range    Sodium 140 136 - 145 mmol/L    Potassium 3.5 3.5 - 5.1 mmol/L    Chloride 108 (H) 98 - 107 mmol/L    CO2 26 21 - 32 mmol/L    Anion gap 6 (L) 7 - 16 mmol/L    Glucose 94 65 - 100 mg/dL    BUN 23 8 - 23 MG/DL    Creatinine 1.21 (H) 0.6 - 1.0 MG/DL    GFR est AA 57 (L) >60 ml/min/1.73m2    GFR est non-AA 47 (L) >60 ml/min/1.73m2    Calcium 9.3 8.3 - 10.4 MG/DL   PLEASE READ & DOCUMENT PPD TEST IN 48 HRS    Collection Time: 12/15/21 12:27 PM   Result Value Ref Range    PPD Negative Negative    mm Induration 0 0 - 5 mm   CBC W/O DIFF    Collection Time: 12/16/21  5:44 AM   Result Value Ref Range    WBC 3.3 (L) 4.3 - 11.1 K/uL    RBC 2.21 (L) 4.05 - 5.2 M/uL    HGB 7.6 (L) 11.7 - 15.4 g/dL    HCT 22.4 (L) 35.8 - 46.3 %    .4 (H) 79.6 - 97.8 FL    MCH 34.4 (H) 26.1 - 32.9 PG    MCHC 33.9 31.4 - 35.0 g/dL    RDW 12.0 11.9 - 14.6 %    PLATELET 076 512 - 223 K/uL    MPV 8.6 (L) 9.4 - 12.3 FL    ABSOLUTE NRBC 0.00 0.0 - 0.2 K/uL   METABOLIC PANEL, BASIC    Collection Time: 12/16/21  5:44 AM   Result Value Ref Range    Sodium 141 136 - 145 mmol/L    Potassium 3.4 (L) 3.5 - 5.1 mmol/L    Chloride 110 (H) 98 - 107 mmol/L    CO2 26 21 - 32 mmol/L    Anion gap 5 (L) 7 - 16 mmol/L    Glucose 88 65 - 100 mg/dL    BUN 19 8 - 23 MG/DL    Creatinine 1.12 (H) 0.6 - 1.0 MG/DL    GFR est AA >60 >60 ml/min/1.73m2    GFR est non-AA 52 (L) >60 ml/min/1.73m2    Calcium 8.9 8.3 - 10.4 MG/DL       All Micro Results     Procedure Component Value Units Date/Time    CULTURE, BLOOD [[id number]] Collected: 12/09/21 0136    Order Status: Completed Specimen: Blood Updated: 12/14/21 0614     Special Requests: --        RIGHT  HAND       Culture result: NO GROWTH 5 DAYS       CULTURE, BLOOD [[id number]] Collected: 12/09/21 0426    Order Status: Completed Specimen: Blood Updated: 12/14/21 0614     Special Requests: --        RIGHT  ARM       Culture result: NO GROWTH 5 DAYS       COVID-19 RAPID TEST [[id number]] Collected: 12/09/21 0136    Order Status: Completed Specimen: Nasopharyngeal Updated: 12/09/21 0223     Specimen source NASAL        COVID-19 rapid test Not detected Comment:      The specimen is NEGATIVE for SARS-CoV-2, the novel coronavirus associated with COVID-19. A negative result does not rule out COVID-19. This test has been authorized by the FDA under an Emergency Use Authorization (EUA) for use by authorized laboratories. Fact sheet for Healthcare Providers: ConventionUpdate.co.nz  Fact sheet for Patients: ConventionUpdate.co.nz       Methodology: Isothermal Nucleic Acid Amplification               Other Studies:  No results found. Current Meds:  Current Facility-Administered Medications   Medication Dose Route Frequency    amLODIPine (NORVASC) tablet 10 mg  10 mg Oral DAILY    0.9% sodium chloride infusion  75 mL/hr IntraVENous CONTINUOUS    atorvastatin (LIPITOR) tablet 80 mg  80 mg Oral DAILY    carvediloL (COREG) tablet 12.5 mg  12.5 mg Oral DAILY    cholecalciferol (VITAMIN D3) (1000 Units /25 mcg) tablet 4,000 Units  4,000 Units Oral DAILY    famotidine (PEPCID) tablet 40 mg  40 mg Oral DAILY    ezetimibe (ZETIA) tablet 10 mg  10 mg Oral DAILY    levothyroxine (SYNTHROID) tablet 150 mcg  150 mcg Oral ACB    [Held by provider] lisinopril-hydroCHLOROthiazide (PRINZIDE, ZESTORETIC) 20-12.5 mg per tablet 1 Tablet  1 Tablet Oral BID    sertraline (ZOLOFT) tablet 150 mg  150 mg Oral DAILY    sodium chloride (NS) flush 5-40 mL  5-40 mL IntraVENous Q8H    sodium chloride (NS) flush 5-40 mL  5-40 mL IntraVENous PRN    enoxaparin (LOVENOX) injection 40 mg  40 mg SubCUTAneous Q24H    LORazepam (ATIVAN) injection 1 mg  1 mg IntraVENous Q6H PRN    sodium chloride (NS) flush 5-10 mL  5-10 mL IntraVENous Q8H    sodium chloride (NS) flush 5-10 mL  5-10 mL IntraVENous PRN       Signed:  Juana Orr MD    Part of this note may have been written by using a voice dictation software. The note has been proof read but may still contain some grammatical/other typographical errors.

## 2021-12-16 NOTE — PROGRESS NOTES
Pt is for discharge  today  For [address]: Michelle Washington  Rm:26A  Report:[phone]  Transport: Hope Ales  Time: 0  MD and RN notified  No further needs/supportive care orders received for CM at this time. Pt will have close follow up per facility    Milestones Met    Care Management Interventions  PCP Verified by CM:  Yes  Mode of Transport at Discharge: BLS [facility] transport 0)  Transition of Care Consult (CM Consult): Discharge Planning,SNF  Partner SNF: Yes  MyChart Signup: No  Discharge Durable Medical Equipment: No  Physical Therapy Consult: Yes  Occupational Therapy Consult: Yes  Speech Therapy Consult: No  Support Systems: Child(annamaria)  Confirm Follow Up Transport: Family  The Plan for Transition of Care is Related to the Following Treatment Goals : return to baseline  The Patient and/or Patient Representative was Provided with a Choice of Provider and Agrees with the Discharge Plan?: Yes  Name of the Patient Representative Who was Provided with a Choice of Provider and Agrees with the Discharge Plan: patient  Freedom of Choice List was Provided with Basic Dialogue that Supports the Patient's Individualized Plan of Care/Goals, Treatment Preferences and Shares the Quality Data Associated with the Providers?: Yes  The [employer] Information Provided?: No  Discharge Location  Discharge Placement: Skilled nursing facility ([facility] )

## 2022-03-18 PROBLEM — J18.9 RLL PNEUMONIA: Status: ACTIVE | Noted: 2021-11-30

## 2022-03-19 PROBLEM — I10 ESSENTIAL HYPERTENSION: Status: ACTIVE | Noted: 2017-01-04

## 2022-03-20 PROBLEM — N18.30 CKD (CHRONIC KIDNEY DISEASE) STAGE 3, GFR 30-59 ML/MIN (HCC): Status: ACTIVE | Noted: 2021-12-14

## (undated) DEVICE — (D)PREP SKN CHLRAPRP APPL 26ML -- CONVERT TO ITEM 371833

## (undated) DEVICE — NEEDLE HYPO 25GA L1.5IN BLU POLYPR HUB S STL REG BVL STR

## (undated) DEVICE — DERMABOND SKIN ADH 0.7ML -- DERMABOND ADVANCED 12/BX

## (undated) DEVICE — SYRINGE EAR 2OZ ULC SLIMMER TIP FLAT BTM SUCT PWR DISP FOR

## (undated) DEVICE — BANDAGE COMPR 9 FTX4 IN SMOOTH COMFORTABLE SYNTH ESMRK LF

## (undated) DEVICE — DRAPE, FILM SHEET, 44X65 STERILE: Brand: MEDLINE

## (undated) DEVICE — SOLUTION IV 1000ML 0.9% SOD CHL

## (undated) DEVICE — TRNQT RMFG 18IN CUFF SING BLAD -- LAWSON OEM ITEM 338151

## (undated) DEVICE — DRAPE,HAND,STERILE: Brand: MEDLINE

## (undated) DEVICE — PADDING CAST W2INXL4YD ST COT COHESIVE HND TEARABLE SPEC

## (undated) DEVICE — SURGICAL PROCEDURE PACK BASIC ST FRANCIS

## (undated) DEVICE — SUT PROL 3-0 18IN PS2 BLU --

## (undated) DEVICE — STERILE HOOK LOCK LATEX FREE ELASTIC BANDAGE 2INX5YD: Brand: HOOK LOCK™